# Patient Record
Sex: MALE | Race: WHITE | NOT HISPANIC OR LATINO | URBAN - METROPOLITAN AREA
[De-identification: names, ages, dates, MRNs, and addresses within clinical notes are randomized per-mention and may not be internally consistent; named-entity substitution may affect disease eponyms.]

---

## 2017-07-26 ENCOUNTER — EMERGENCY (EMERGENCY)
Facility: HOSPITAL | Age: 42
LOS: 1 days | Discharge: LEFT BEFORE DISCHARGE | End: 2017-07-26
Admitting: EMERGENCY MEDICINE
Payer: MEDICARE

## 2017-07-26 VITALS
OXYGEN SATURATION: 96 % | SYSTOLIC BLOOD PRESSURE: 138 MMHG | WEIGHT: 225.09 LBS | DIASTOLIC BLOOD PRESSURE: 86 MMHG | TEMPERATURE: 98 F | HEART RATE: 107 BPM | RESPIRATION RATE: 18 BRPM

## 2017-07-26 DIAGNOSIS — S01.511A LACERATION WITHOUT FOREIGN BODY OF LIP, INITIAL ENCOUNTER: ICD-10-CM

## 2017-07-26 DIAGNOSIS — Z79.899 OTHER LONG TERM (CURRENT) DRUG THERAPY: ICD-10-CM
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
## 2017-07-26 PROCEDURE — 99283 EMERGENCY DEPT VISIT LOW MDM: CPT

## 2017-07-26 PROCEDURE — 99282 EMERGENCY DEPT VISIT SF MDM: CPT

## 2017-07-26 NOTE — ED PROVIDER NOTE - ENMT, MLM
Airway patent, Nasal mucosa clear. Mouth with normal mucosa. Throat has no vesicles, no oropharyngeal exudates and uvula is midline, dentition intact, mild sts to rt maxillofacial area no underlying bony ttp of maxillofacial bones

## 2017-07-26 NOTE — ED PROVIDER NOTE - SKIN, MLM
Skin normal color for race, warm, dry and intact. No evidence of rash, superficial 2cm laceration above rt upper lip slightly encroaching vermillion border, wound scabbed over,no active bleeding,  no surrounding erythema or wound discharge

## 2017-07-26 NOTE — ED ADULT NURSE NOTE - OBJECTIVE STATEMENT
Pt presents with Lac to R upper lip that happened yesterday around 2pm after [place] the [place]. Pt states recent tetanus shot within the last year.

## 2017-07-26 NOTE — ED PROVIDER NOTE - OBJECTIVE STATEMENT
MD 40 yo m with no significant pmh presents to ed for evaluation of facial laceration he sustained yesterday at 2pm.  Pt reports tripping on subway steps and hitting face on the floor.  Pt reports he did not feel injury was a "big deal" at the time but woke up this morning concerned that he might need stitches.  Pt otherwise denies any loc, nausea, vomiting, changes in vision, weakness, other musculoskeletal pain/trauma/injuries, nausea, vomiting, headaches, dizziness, not on blood thinners. Tetanus vaccine utd

## 2017-07-26 NOTE — ED PROVIDER NOTE - PROGRESS NOTE DETAILS
Pt in the middle of evaluation got up, after discussing that laceration had already undergone significant healing since it has been 19 hours since initial injury. Pt reports he is in a hurry, refused any further exam, offered to clean wound, give him a work note, pt deferred and walked out of ed.

## 2017-07-26 NOTE — ED PROVIDER NOTE - MEDICAL DECISION MAKING DETAILS
MD 42 yo m with no significant pmh presents to ed for evaluation of facial laceration he sustained yesterday at 2pm. Significant wound healing already occurred given injury 19 + hours ago.  Pt eloped in the middle of my evaluation, recommended he allow me to clean wound and let me further examine him, pt deferred, also offered pt a work note, pt eloped prior to me finishing my assessment

## 2020-01-22 ENCOUNTER — HOSPITAL ENCOUNTER (INPATIENT)
Dept: HOSPITAL 74 - YASAS | Age: 45
LOS: 1 days | Discharge: LEFT BEFORE DISCHARGE | DRG: 894 | End: 2020-01-23
Attending: ALLERGY & IMMUNOLOGY | Admitting: ALLERGY & IMMUNOLOGY
Payer: COMMERCIAL

## 2020-01-22 VITALS — BODY MASS INDEX: 29.4 KG/M2

## 2020-01-22 DIAGNOSIS — F41.8: ICD-10-CM

## 2020-01-22 DIAGNOSIS — F17.213: ICD-10-CM

## 2020-01-22 DIAGNOSIS — F10.230: Primary | ICD-10-CM

## 2020-01-22 DIAGNOSIS — F32.9: ICD-10-CM

## 2020-01-22 DIAGNOSIS — F14.20: ICD-10-CM

## 2020-01-22 DIAGNOSIS — F19.282: ICD-10-CM

## 2020-01-22 DIAGNOSIS — F39: ICD-10-CM

## 2020-01-22 DIAGNOSIS — F19.24: ICD-10-CM

## 2020-01-23 VITALS — SYSTOLIC BLOOD PRESSURE: 129 MMHG | HEART RATE: 69 BPM | DIASTOLIC BLOOD PRESSURE: 80 MMHG | TEMPERATURE: 97.7 F

## 2020-01-23 PROCEDURE — HZ2ZZZZ DETOXIFICATION SERVICES FOR SUBSTANCE ABUSE TREATMENT: ICD-10-PCS | Performed by: ALLERGY & IMMUNOLOGY

## 2020-01-23 NOTE — HP
CIWA Score


Nausea/Vomitin-Mild Nausea/No Vomiting


Muscle Tremors: 4-Moderate,w/Arms Extend


Anxiety: 3


Agitation: 3


Paroxysmal Sweats: 3


Orientation: 0-Oriented


Tacttile Disturbances: 0-None


Auditory Disturbances: 0-None


Visual Disturbances: 0-None


Headache: 4-Moderately Severe


CIWA-Ar Total Score: 18





- Admission Criteria


[facility] Guidelines: Admission for Medically Managed Detox: 


Requires at least one of the followin. CIWA greater than 12


2. Seizures within the past 24 hours


3. Delirium tremens within the past 24 hours


4. Hallucinations within the past 24 hours


5. Acute intervention needed for co  occurring medical disorder


6. Acute intervention needed for co  occurring psychiatric disorder


7. Severe withdrawal that cannot be handled at a lower level of care (continued


    vomiting, continued diarrhea, abnormal vital signs) requiring intravenous


    medication and/or fluids


8. Pregnancy








Admitting History and Physical





- Smoking History


Smoking history: Former smoker


Have you smoked in the past 12 months: No





- Alcohol/Substance Use


Hx Alcohol Use: Yes





Admission ROS [facility]





- [facility]


Chief Complaint: 





Alcohol withdrawal symptoms


Allergies/Adverse Reactions: 


 Allergies











Allergy/AdvReac Type Severity Reaction Status Date / Time


 


peanut [Peanut] Allergy Severe Itching Verified 20 23:16











History of Present Illness: 





44 years old male with a long history of alcohol dependence is seeking 

admission to detox. Patient was transferred from [facility] emergency room . He denies pasty medical history and reports psych. 

history of depression and anxiety. He reports + eye opener and blackouts. His 

last blackout was yesterday where he was unresponsive and received Narcan from 

EMS. Patient reports that he is unemployed and lives in a shelter. He denies 

suicide attempt and suicidal ideation at this time.


Exam Limitations: No Limitations





- Ebola screening


Have you traveled outside of the country in the last 21 days: No (N)


Have you had contact with anyone from an Ebola affected area: No


Do you have a fever: No





- Review of Systems


Constitutional: Chills, Malaise, Night Sweats, Changes in sleep


EENT: reports: No Symptoms Reported


Respiratory: reports: No Symptoms reported


Cardiac: reports: No Symptoms Reported


GI: reports: Nausea, Poor Appetite, Poor Fluid Intake, Abdominal cramping


: reports: No Symptoms Reported


Musculoskeletal: reports: Back Pain


Integumentary: reports: Dryness, Flushing


Neuro: reports: Tremors


Endocrine: reports: No Symptoms Reported


Hematology: reports: No Symptoms Reported


Psychiatric: reports: Mood/Affect Appropiate, Orientated x3, Anxious, Depressed


Other Systems: Reviewed and Negative





Patient History





- Patient Medical History


Hx Anemia: No


Hx Asthma: No


Hx Chronic Obstructive Pulmonary Disease (COPD): No


Hx Cardiac Disorders: No


Hx Congestive Heart Failure: No


Hx Hypertension: No


Hx Hypercholesterolemia: No


Hx Pacemaker: No


HX Cerebrovascular Accident: No


Hx Seizures: No


Hx Diabetes: No


Hx Gastrointestinal Disorders: No


Hx Liver Disease: No


Hx Genitourinary Disorders: No


Hx Sexually Transmitted Disorders: No


Hx Renal Disease (ESRD): No


Hx Thyroid Disease: No


Hx Human Immunodeficiency Virus (HIV): No (Negative 2019)


Hx Hepatitis C: No


Hx Depression: Yes


Hx Suicide Attempt: No


Hx Bipolar Disorder: No


Hx Schizophrenia: No


Other Medical History: Anxiety -





- Patient Surgical History


Past Surgical History: No





- PPD History


Previous Implant?: No


Documented Results: Negative w/proof


Implanted On Prior R Admission?: Yes


Date: 12


PPD to be Administered?: Yes





- Reproductive History


Patient is a Female of Child Bearing Age (11 -55 yrs old): No (male)





- Smoking Cessation


Smoking history: Current every day smoker


Have you smoked in the past 12 months: No


Aproximately how many cigarettes per day: 20


Hx Chewing Tobacco Use: No


Initiated information on smoking cessation: Yes


'Breaking Loose' booklet given: 20





- Substance & Tx. History


Hx Alcohol Use: Yes


Hx Substance Use: Yes


Substance Use Type: Alcohol, Cocaine


Hx Substance Use Treatment: Yes ([facility])





- Substances abused


  ** Alcohol


Substance route: Oral


Frequency: Daily


Amount used: 12 packs beer


Age of first use: 18


Date of last use: 20





  ** Cocaine


Substance route: Smoking


Frequency: 1-2 times per week


Amount used: 300 hundred dollars.


Age of first use: 37


Date of last use: 20





Admission Physical Exam [facility]





- Vital Signs


Vital Signs: 


 Vital Signs - 24 hr











  20





  23:15


 


Temperature 98.0 F


 


Pulse Rate 69


 


Respiratory 16





Rate 


 


Blood Pressure 126/74














- Physical


General Appearance: Yes: Moderate Distress, Tremorous, Sweating, Anxious


HEENTM: Yes: Within Normal Limits


Respiratory: Yes: Lungs Clear, Normal Breath Sounds, No Respiratory Distress


Neck: Yes: Within Normal Limits


Breast: Yes: Breast Exam Deferred


Cardiology: Yes: Regular Rhythm, Regular Rate


Abdominal: Yes: Normal Bowel Sounds, Soft


Genitourinary: Yes: Within Normal Limits


Back: Yes: Normal Inspection


Musculoskeletal: Yes: Within Normal Limits


Extremities: Yes: Normal Inspection


Neurological: Yes: Within Normal Limits, Alert, Normal Mood/Affect


Integumentary: Yes: Warm


Lymphatic: Yes: Within Normal Limits





- Diagnostic


(1) Alcohol dependence with withdrawal, uncomplicated


Current Visit: Yes   Status: Acute   





(2) Nicotine dependence


Current Visit: Yes   Status: Chronic   


Qualifiers: 


   Nicotine product type: cigarettes   Substance use status: in withdrawal   

Qualified Code(s): F17.213 - Nicotine dependence, cigarettes, with withdrawal   





(3) Depression


Current Visit: Yes   Status: Chronic   


Qualifiers: 


   Depression Type: unspecified   Qualified Code(s): F32.9 - Major depressive 

disorder, single episode, unspecified   





(4) Anxiety


Current Visit: Yes   Status: Chronic   





Cleared for Admission [facility]





- Detox or Rehab


[facility] Level of Care: Medically Managed


Detox Regimen/Protocol: Librium


Claeared for Rehab Admission: No





Breathalyzer





- Breathalyzer


Breathalyzer: 0.19





Urine Drug Screen





- Test Device


Lot number: [id number]


Expiration date: 21





- Control


Is test valid?: Yes





- Results


Drug screen NEGATIVE: No


Urine drug screen results: BZO-Benzodiazepines





Inpatient Rehab Admission





- Rehab Decision to Admit


Inpatient rehab admission?: No

## 2020-01-23 NOTE — PN
S CIWA





- CIWA Score


Nausea/Vomitin-Mild Nausea/No Vomiting


Muscle Tremors: 2


Anxiety: 2


Agitation: 1-Slight > Activity


Paroxysmal Sweats: 3


Orientation: 0-Oriented


Tacttile Disturbances: 1-Very Mild Itch/Numbness


Auditory Disturbances: 0-None


Visual Disturbances: 1-Very Mild Sensitivity


Headache: 0-None Present


CIWA-Ar Total Score: 11





BHS Progress Note (SOAP)


Subjective: 





Patient is  a 45 yo male with hx of alcohol dependence on librium detox 

protocol c/o of sweats, back pain, body aches, chills. 


Objective: 





20 12:38


 Vital Signs











Temperature  97.4 F L  20 09:27


 


Pulse Rate  71   20 09:27


 


Respiratory Rate  18   20 09:27


 


Blood Pressure  128/80   20 09:27


 


O2 Sat by Pulse Oximetry (%)      








labs pending 


Assessment: 





20 12:38


Patient is Aox3 no acute distress 


+ diaphoretic


full ROM no gait disturbance ambulating in the unit 





withdrawal sx 


Plan: 





increase fluids 


continue detox 


continue to monitor

## 2020-01-23 NOTE — EKG
Test Reason : 

Blood Pressure : ***/*** mmHG

Vent. Rate : 065 BPM     Atrial Rate : 065 BPM

   P-R Int : 140 ms          QRS Dur : 088 ms

    QT Int : 434 ms       P-R-T Axes : 043 066 030 degrees

   QTc Int : 451 ms

 

NORMAL SINUS RHYTHM

NORMAL ECG

NO PREVIOUS ECGS AVAILABLE

Confirmed by SALO ELMORE MD ([id number]) on 1/23/2020 3:15:02 PM

 

Referred By: Clint Solis           Confirmed By:SALO ELMORE MD

## 2020-01-23 NOTE — DS
[facility] Detox Discharge Summary


Admission Date: 


01/23/20





Discharge Date: 01/23/20 (Pt left AMA)





- History


Present History: Alcohol Dependence, Cocaine Dependence


Additional Comments: 





As per H&P: "44 years old male with a long history of alcohol dependence is 

seeking admission to detox. Patient was transferred from [facility] emergency room . He denies pasty medical history and reports psych. 

history of depression and anxiety. He reports + eye opener and blackouts. His 

last blackout was yesterday where he was unresponsive and received Narcan from 

EMS. Patient reports that he is unemployed and lives in a shelter. He denies 

suicide attempt and suicidal ideation at this time". 


Pt left AMA, pt did not complete the detox protocol. Pt did not wait for the 

provider to evaluate him. As per RN, pt states, he just want to leave and an 

attempt to let him stay and complete the detox protocol failed. Pt was admitted 

today and left AMA.


Pertinent Past History: 





h/o alcohol and cocaine use disorder.





- Physical Exam Results


Vital Signs: 


 Vital Signs











Temperature  97.7 F   01/23/20 21:35


 


Pulse Rate  69   01/23/20 21:35


 


Respiratory Rate  18   01/23/20 21:35


 


Blood Pressure  129/80   01/23/20 21:35


 


O2 Sat by Pulse Oximetry (%)      








 Vital Signs











  01/23/20 01/23/20





  17:19 21:35


 


Temperature 98.1 F 97.7 F


 


Pulse Rate 66 69


 


Respiratory 18 18





Rate  


 


Blood Pressure 131/84 129/80








Pertinent Admission Physical Exam Findings: 





withdrawal symptoms.





- Medication


Discharge Medications: 


Ambulatory Orders





Fluoxetine HCl [Prozac] 20 mg PO DAILY 07/09/12 


Gabapentin 1,200 mg PO TID 07/09/12 


clonazePAM [Klonopin] 1 mg PO BID 07/09/12 











- Diagnosis


(1) Alcohol dependence


Status: Active   





(2) Alcohol dependence with withdrawal, uncomplicated


Status: Acute   





(3) Cocaine dependence


Status: Chronic   





(4) Nicotine dependence


Status: Chronic   


Qualifiers: 


   Nicotine product type: cigarettes   Substance use status: in withdrawal   

Qualified Code(s): F17.213 - Nicotine dependence, cigarettes, with withdrawal   





- AMA


Did Patient Leave Against Medical Advice: Yes

## 2020-01-23 NOTE — CONSULT
[facility] Psychiatric Consult





- Data


Date of interview: 01/23/20


Admission source: [facility]


Identifying data: Patient is a 44 year old single male, without children, 

unemployed, homeless, and is supported by SSD. This is patient's first 

admission to detox at [facility]. Patient admitted to  for alcohol 

and cocaine dependence.


Substance Abuse History: Smoking Cessation.  Smoking history: Current every day 

smoker.  Have you smoked in the past 12 months: No.  Aproximately how many 

cigarettes per day: 20.  Hx Chewing Tobacco Use: No.  Initiated information on 

smoking cessation: Yes.  'Breaking Loose' booklet given: 01/23/20.  - Substance 

& Tx. History.  Hx Alcohol Use: Yes.  Hx Substance Use: Yes.  Substance Use Type

: Alcohol, Cocaine.  Hx Substance Use Treatment: Yes ([facility]).  - Substances abused.  ** Alcohol.  Substance route: Oral.  

Frequency: Daily.  Amount used: 12 packs beer.  Age of first use: 18.  Date of 

last use: 01/22/20.  ** Cocaine.  Substance route: Smoking.  Frequency: 1-2 

times per week.  Amount used: 300 hundred dollars.  Age of first use: 37.  Date 

of last use: 01/08/20


Psychiatric History: Patient presents as a vague historian. Patient's first 

psychiatric contact was in 2005 at a private office due to feeling depressed. 

He was prescribed klonopin + other psychotropic medications. Stated to writer,  

" I do not want to go through every medications i have taken." States he was 

diagnosed with OCD, schizoaffective disorder. Mr. Monahan's most recent 

outpatient psychiatric care was one year ago in [place] but is unable to 

recall medications prescribed. Patient appears to have a history of 

noncompliance to treatment. He denies history of psychiatric hospitalizations 

and suicide attempt. At present he denies auditory/visual hallucinations.


Physical/Sexual Abuse/Trauma History: denies.





Mental Status Exam





- Mental Status Exam


Alert and Oriented to: Time, Place, Person


Cognitive Function: Good


Patient Appearance: Well Groomed


Mood: Anxious, Irritable (slightly irritable but in control)


Affect: Mood Congruent


Patient Behavior: Fatigued, Uncooperative (unwilling to elaborate on history. 

states he's not in the right state of mind to answer all the questions. )


Speech Pattern: Appropriate


Voice Loudness: Normal


Thought Process: Goal Oriented


Thought Disorder: Not Present


Hallucinations: Denies


Suicidal Ideation: Denies


Homicidal Ideation: Denies


Insight/Judgement: Poor


Sleep: Poorly


Appetite: Fair


Muscle strength/Tone: Normal


Gait/Station: Normal





Psychiatric Findings





- Problem List (Axis 1, 2,3)


(1) Alcohol dependence with withdrawal, uncomplicated


Current Visit: Yes   Status: Acute   





(2) Nicotine dependence


Current Visit: Yes   Status: Chronic   


Qualifiers: 


   Nicotine product type: cigarettes   Substance use status: in withdrawal   

Qualified Code(s): F17.213 - Nicotine dependence, cigarettes, with withdrawal   





(3) Substance induced mood disorder


Current Visit: Yes   Status: Acute   





(4) Substance-induced sleep disorder


Current Visit: Yes   Status: Acute   





(5) Mood disorder


Current Visit: No   Status: Suspected   





- Initial Treatment Plan


Initial Treatment Plan: Psychoeducation provided. Detoxification in progress. 

Will order Melatonin 10mg HS. Benefits and side effects discussed. Verbal 

consent given.

## 2020-04-21 ENCOUNTER — EMERGENCY (EMERGENCY)
Facility: HOSPITAL | Age: 45
LOS: 1 days | Discharge: HOME | End: 2020-04-21
Attending: EMERGENCY MEDICINE | Admitting: EMERGENCY MEDICINE
Payer: MEDICARE

## 2020-04-21 VITALS
OXYGEN SATURATION: 98 % | TEMPERATURE: 98 F | HEART RATE: 75 BPM | SYSTOLIC BLOOD PRESSURE: 140 MMHG | RESPIRATION RATE: 16 BRPM | DIASTOLIC BLOOD PRESSURE: 66 MMHG | WEIGHT: 209.44 LBS

## 2020-04-21 VITALS
DIASTOLIC BLOOD PRESSURE: 80 MMHG | RESPIRATION RATE: 18 BRPM | HEART RATE: 60 BPM | TEMPERATURE: 98 F | SYSTOLIC BLOOD PRESSURE: 144 MMHG | OXYGEN SATURATION: 98 %

## 2020-04-21 LAB
ALBUMIN SERPL ELPH-MCNC: 3.8 G/DL — SIGNIFICANT CHANGE UP (ref 3.3–5)
ALP SERPL-CCNC: 59 U/L — SIGNIFICANT CHANGE UP (ref 40–120)
ALT FLD-CCNC: 18 U/L — SIGNIFICANT CHANGE UP (ref 10–45)
ANION GAP SERPL CALC-SCNC: 9 MMOL/L — SIGNIFICANT CHANGE UP (ref 5–17)
AST SERPL-CCNC: 26 U/L — SIGNIFICANT CHANGE UP (ref 10–40)
BASOPHILS # BLD AUTO: 0.04 K/UL — SIGNIFICANT CHANGE UP (ref 0–0.2)
BASOPHILS NFR BLD AUTO: 0.6 % — SIGNIFICANT CHANGE UP (ref 0–2)
BILIRUB SERPL-MCNC: 0.2 MG/DL — SIGNIFICANT CHANGE UP (ref 0.2–1.2)
BUN SERPL-MCNC: 9 MG/DL — SIGNIFICANT CHANGE UP (ref 7–23)
CALCIUM SERPL-MCNC: 9.1 MG/DL — SIGNIFICANT CHANGE UP (ref 8.4–10.5)
CHLORIDE SERPL-SCNC: 103 MMOL/L — SIGNIFICANT CHANGE UP (ref 96–108)
CO2 SERPL-SCNC: 25 MMOL/L — SIGNIFICANT CHANGE UP (ref 22–31)
CREAT SERPL-MCNC: 0.93 MG/DL — SIGNIFICANT CHANGE UP (ref 0.5–1.3)
EOSINOPHIL # BLD AUTO: 0.18 K/UL — SIGNIFICANT CHANGE UP (ref 0–0.5)
EOSINOPHIL NFR BLD AUTO: 2.8 % — SIGNIFICANT CHANGE UP (ref 0–6)
GLUCOSE SERPL-MCNC: 101 MG/DL — HIGH (ref 70–99)
HCT VFR BLD CALC: 39.5 % — SIGNIFICANT CHANGE UP (ref 39–50)
HGB BLD-MCNC: 13.1 G/DL — SIGNIFICANT CHANGE UP (ref 13–17)
IMM GRANULOCYTES NFR BLD AUTO: 0.6 % — SIGNIFICANT CHANGE UP (ref 0–1.5)
LYMPHOCYTES # BLD AUTO: 2.37 K/UL — SIGNIFICANT CHANGE UP (ref 1–3.3)
LYMPHOCYTES # BLD AUTO: 36.6 % — SIGNIFICANT CHANGE UP (ref 13–44)
MCHC RBC-ENTMCNC: 30.3 PG — SIGNIFICANT CHANGE UP (ref 27–34)
MCHC RBC-ENTMCNC: 33.2 GM/DL — SIGNIFICANT CHANGE UP (ref 32–36)
MCV RBC AUTO: 91.4 FL — SIGNIFICANT CHANGE UP (ref 80–100)
MONOCYTES # BLD AUTO: 0.49 K/UL — SIGNIFICANT CHANGE UP (ref 0–0.9)
MONOCYTES NFR BLD AUTO: 7.6 % — SIGNIFICANT CHANGE UP (ref 2–14)
NEUTROPHILS # BLD AUTO: 3.36 K/UL — SIGNIFICANT CHANGE UP (ref 1.8–7.4)
NEUTROPHILS NFR BLD AUTO: 51.8 % — SIGNIFICANT CHANGE UP (ref 43–77)
NRBC # BLD: 0 /100 WBCS — SIGNIFICANT CHANGE UP (ref 0–0)
PLATELET # BLD AUTO: 268 K/UL — SIGNIFICANT CHANGE UP (ref 150–400)
POTASSIUM SERPL-MCNC: 4.3 MMOL/L — SIGNIFICANT CHANGE UP (ref 3.5–5.3)
POTASSIUM SERPL-SCNC: 4.3 MMOL/L — SIGNIFICANT CHANGE UP (ref 3.5–5.3)
PROT SERPL-MCNC: 6.4 G/DL — SIGNIFICANT CHANGE UP (ref 6–8.3)
RBC # BLD: 4.32 M/UL — SIGNIFICANT CHANGE UP (ref 4.2–5.8)
RBC # FLD: 12.1 % — SIGNIFICANT CHANGE UP (ref 10.3–14.5)
SARS-COV-2 RNA SPEC QL NAA+PROBE: SIGNIFICANT CHANGE UP
SODIUM SERPL-SCNC: 137 MMOL/L — SIGNIFICANT CHANGE UP (ref 135–145)
TROPONIN T SERPL-MCNC: <0.01 NG/ML — SIGNIFICANT CHANGE UP (ref 0–0.01)
WBC # BLD: 6.48 K/UL — SIGNIFICANT CHANGE UP (ref 3.8–10.5)
WBC # FLD AUTO: 6.48 K/UL — SIGNIFICANT CHANGE UP (ref 3.8–10.5)

## 2020-04-21 PROCEDURE — 71045 X-RAY EXAM CHEST 1 VIEW: CPT | Mod: 26

## 2020-04-21 PROCEDURE — 36415 COLL VENOUS BLD VENIPUNCTURE: CPT

## 2020-04-21 PROCEDURE — 85025 COMPLETE CBC W/AUTO DIFF WBC: CPT

## 2020-04-21 PROCEDURE — 71045 X-RAY EXAM CHEST 1 VIEW: CPT

## 2020-04-21 PROCEDURE — 99285 EMERGENCY DEPT VISIT HI MDM: CPT

## 2020-04-21 PROCEDURE — 87635 SARS-COV-2 COVID-19 AMP PRB: CPT

## 2020-04-21 PROCEDURE — 99283 EMERGENCY DEPT VISIT LOW MDM: CPT | Mod: 25

## 2020-04-21 PROCEDURE — 84484 ASSAY OF TROPONIN QUANT: CPT

## 2020-04-21 PROCEDURE — 80053 COMPREHEN METABOLIC PANEL: CPT

## 2020-04-21 RX ORDER — ASPIRIN/CALCIUM CARB/MAGNESIUM 324 MG
325 TABLET ORAL ONCE
Refills: 0 | Status: COMPLETED | OUTPATIENT
Start: 2020-04-21 | End: 2020-04-21

## 2020-04-21 RX ADMIN — Medication 325 MILLIGRAM(S): at 21:25

## 2020-04-21 NOTE — ED ADULT TRIAGE NOTE - NS ED TRIAGE AVPU SCALE
Alert-The patient is alert, awake and responds to voice. The patient is oriented to time, place, and person. The triage nurse is able to obtain subjective information. decreased strength

## 2020-04-21 NOTE — ED PROVIDER NOTE - CLINICAL SUMMARY MEDICAL DECISION MAKING FREE TEXT BOX
here w/ chest pain in setting of infectious types symptoms concerning for poss covid-19 however no hypoxia, does not meet threshold for admission at this time. will send swab, and check cxr and troponin

## 2020-04-21 NOTE — ED ADULT NURSE NOTE - NSIMPLEMENTINTERV_GEN_ALL_ED
Implemented All Universal Safety Interventions:  [place] to call system. Call bell, personal items and telephone within reach. Instruct patient to call for assistance. Room bathroom lighting operational. Non-slip footwear when patient is off stretcher. Physically safe environment: no spills, clutter or unnecessary equipment. Stretcher in lowest position, wheels locked, appropriate side rails in place.

## 2020-04-21 NOTE — ED PROVIDER NOTE - NSFOLLOWUPINSTRUCTIONS_ED_ALL_ED_FT
We sent a COVID-19 test today, the results should come by tomorrow or the day after. Please refer to the COVID-19 specific discharge papers we gave you today.    Chest Pain    Chest pain can be caused by many different conditions which may or may not be dangerous. Causes include heartburn, lung infections, heart attack, blood clot in lungs, skin infections, strain or damage to muscle, cartilage, or bones, etc. In addition to a history and physical examination, an electrocardiogram (ECG) or other lab tests may have been performed to determine the cause of your chest pain. Follow up with your primary care provider or with a cardiologist as instructed.     SEEK IMMEDIATE MEDICAL CARE IF YOU HAVE ANY OF THE FOLLOWING SYMPTOMS: worsening chest pain, coughing up blood, unexplained back/neck/jaw pain, severe abdominal pain, dizziness or lightheadedness, fainting, shortness of breath, sweaty or clammy skin, vomiting, or racing heart beat. These symptoms may represent a serious problem that is an emergency. Do not wait to see if the symptoms will go away. Get medical help right away. Call 911 and do not drive yourself to the hospital.

## 2020-04-21 NOTE — ED ADULT NURSE NOTE - OBJECTIVE STATEMENT
pt complains of chest pain that started "months ago." Pt denies fever, SOB or cough. Unable to further assess pain due to pt's refusal. Pt stated "I do not want to talk about it please stop."

## 2020-04-21 NOTE — ED PROVIDER NOTE - OBJECTIVE STATEMENT
43yo M denies PMH (but states hasn't seen a doctor in a while), takes no meds, here w/ complaint of chest pain, cough, "cold symptoms" and subj fevers for several days, pt unclear exactly how long. Feels L sided chest pressure that radiates diffusely. No SOB. No N/V/D, abdominal pain.

## 2020-04-21 NOTE — ED PROVIDER NOTE - PATIENT PORTAL LINK FT
You can access the FollowMyHealth Patient Portal offered by [facility] by registering at the following website: [email or web address]. By joining [facility]’s FollowMyHealth portal, you will also be able to view your health information using other applications (apps) compatible with our system.

## 2020-04-25 DIAGNOSIS — R05 COUGH: ICD-10-CM

## 2020-04-25 DIAGNOSIS — R50.9 FEVER, UNSPECIFIED: ICD-10-CM

## 2020-04-25 DIAGNOSIS — Z20.828 CONTACT WITH AND (SUSPECTED) EXPOSURE TO OTHER VIRAL COMMUNICABLE DISEASES: ICD-10-CM

## 2020-04-25 DIAGNOSIS — F17.200 NICOTINE DEPENDENCE, UNSPECIFIED, UNCOMPLICATED: ICD-10-CM

## 2020-04-25 DIAGNOSIS — R07.9 CHEST PAIN, UNSPECIFIED: ICD-10-CM

## 2020-04-25 DIAGNOSIS — R07.89 OTHER CHEST PAIN: ICD-10-CM

## 2021-03-21 ENCOUNTER — EMERGENCY (EMERGENCY)
Facility: HOSPITAL | Age: 46
LOS: 0 days | Discharge: HOME | End: 2021-03-21
Admitting: EMERGENCY MEDICINE
Payer: SELF-PAY

## 2021-03-21 DIAGNOSIS — Z53.21 PROCEDURE AND TREATMENT NOT CARRIED OUT DUE TO PATIENT LEAVING PRIOR TO BEING SEEN BY HEALTH CARE PROVIDER: ICD-10-CM

## 2021-03-21 PROCEDURE — L9991: CPT

## 2021-10-29 ENCOUNTER — EMERGENCY (EMERGENCY)
Facility: HOSPITAL | Age: 46
LOS: 1 days | Discharge: HOME | End: 2021-10-29
Attending: EMERGENCY MEDICINE
Payer: MEDICARE

## 2021-10-29 ENCOUNTER — INPATIENT (INPATIENT)
Facility: HOSPITAL | Age: 46
LOS: 10 days | Discharge: HOME | End: 2021-11-09
Attending: STUDENT IN AN ORGANIZED HEALTH CARE EDUCATION/TRAINING PROGRAM | Admitting: STUDENT IN AN ORGANIZED HEALTH CARE EDUCATION/TRAINING PROGRAM
Payer: MEDICARE

## 2021-10-29 VITALS — WEIGHT: 244.93 LBS

## 2021-10-29 VITALS
TEMPERATURE: 98 F | SYSTOLIC BLOOD PRESSURE: 132 MMHG | OXYGEN SATURATION: 97 % | HEART RATE: 78 BPM | DIASTOLIC BLOOD PRESSURE: 86 MMHG | RESPIRATION RATE: 18 BRPM

## 2021-10-29 VITALS
HEART RATE: 83 BPM | SYSTOLIC BLOOD PRESSURE: 151 MMHG | DIASTOLIC BLOOD PRESSURE: 90 MMHG | WEIGHT: 199.96 LBS | RESPIRATION RATE: 18 BRPM | HEIGHT: 69 IN | OXYGEN SATURATION: 97 % | TEMPERATURE: 98 F

## 2021-10-29 DIAGNOSIS — F10.20 ALCOHOL DEPENDENCE, UNCOMPLICATED: ICD-10-CM

## 2021-10-29 DIAGNOSIS — F33.2 MAJOR DEPRESSIVE DISORDER, RECURRENT SEVERE WITHOUT PSYCHOTIC FEATURES: ICD-10-CM

## 2021-10-29 DIAGNOSIS — F10.10 ALCOHOL ABUSE, UNCOMPLICATED: ICD-10-CM

## 2021-10-29 LAB
ALBUMIN SERPL ELPH-MCNC: 4.4 G/DL — SIGNIFICANT CHANGE UP (ref 3.3–5.2)
ALP SERPL-CCNC: 87 U/L — SIGNIFICANT CHANGE UP (ref 40–120)
ALT FLD-CCNC: 31 U/L — SIGNIFICANT CHANGE UP
AMPHET UR-MCNC: NEGATIVE — SIGNIFICANT CHANGE UP
ANION GAP SERPL CALC-SCNC: 16 MMOL/L — SIGNIFICANT CHANGE UP (ref 5–17)
AST SERPL-CCNC: 30 U/L — SIGNIFICANT CHANGE UP
BARBITURATES UR SCN-MCNC: NEGATIVE — SIGNIFICANT CHANGE UP
BASOPHILS # BLD AUTO: 0.03 K/UL — SIGNIFICANT CHANGE UP (ref 0–0.2)
BASOPHILS NFR BLD AUTO: 0.4 % — SIGNIFICANT CHANGE UP (ref 0–2)
BENZODIAZ UR-MCNC: NEGATIVE — SIGNIFICANT CHANGE UP
BILIRUB SERPL-MCNC: <0.2 MG/DL — LOW (ref 0.4–2)
BUN SERPL-MCNC: 20.6 MG/DL — HIGH (ref 8–20)
CALCIUM SERPL-MCNC: 8.7 MG/DL — SIGNIFICANT CHANGE UP (ref 8.6–10.2)
CHLORIDE SERPL-SCNC: 104 MMOL/L — SIGNIFICANT CHANGE UP (ref 98–107)
CO2 SERPL-SCNC: 19 MMOL/L — LOW (ref 22–29)
COCAINE METAB.OTHER UR-MCNC: NEGATIVE — SIGNIFICANT CHANGE UP
CREAT SERPL-MCNC: 0.95 MG/DL — SIGNIFICANT CHANGE UP (ref 0.5–1.3)
EOSINOPHIL # BLD AUTO: 0.19 K/UL — SIGNIFICANT CHANGE UP (ref 0–0.5)
EOSINOPHIL NFR BLD AUTO: 2.3 % — SIGNIFICANT CHANGE UP (ref 0–6)
ETHANOL SERPL-MCNC: <10 MG/DL — SIGNIFICANT CHANGE UP (ref 0–9)
GLUCOSE SERPL-MCNC: 111 MG/DL — HIGH (ref 70–99)
HCT VFR BLD CALC: 38.6 % — LOW (ref 39–50)
HGB BLD-MCNC: 13.6 G/DL — SIGNIFICANT CHANGE UP (ref 13–17)
HIV 1 & 2 AB SERPL IA.RAPID: SIGNIFICANT CHANGE UP
IMM GRANULOCYTES NFR BLD AUTO: 0.4 % — SIGNIFICANT CHANGE UP (ref 0–1.5)
LYMPHOCYTES # BLD AUTO: 3.01 K/UL — SIGNIFICANT CHANGE UP (ref 1–3.3)
LYMPHOCYTES # BLD AUTO: 37.1 % — SIGNIFICANT CHANGE UP (ref 13–44)
MCHC RBC-ENTMCNC: 29.8 PG — SIGNIFICANT CHANGE UP (ref 27–34)
MCHC RBC-ENTMCNC: 35.2 GM/DL — SIGNIFICANT CHANGE UP (ref 32–36)
MCV RBC AUTO: 84.6 FL — SIGNIFICANT CHANGE UP (ref 80–100)
METHADONE UR-MCNC: NEGATIVE — SIGNIFICANT CHANGE UP
MONOCYTES # BLD AUTO: 0.52 K/UL — SIGNIFICANT CHANGE UP (ref 0–0.9)
MONOCYTES NFR BLD AUTO: 6.4 % — SIGNIFICANT CHANGE UP (ref 2–14)
NEUTROPHILS # BLD AUTO: 4.34 K/UL — SIGNIFICANT CHANGE UP (ref 1.8–7.4)
NEUTROPHILS NFR BLD AUTO: 53.4 % — SIGNIFICANT CHANGE UP (ref 43–77)
OPIATES UR-MCNC: NEGATIVE — SIGNIFICANT CHANGE UP
PCP SPEC-MCNC: SIGNIFICANT CHANGE UP
PCP UR-MCNC: NEGATIVE — SIGNIFICANT CHANGE UP
PLATELET # BLD AUTO: 236 K/UL — SIGNIFICANT CHANGE UP (ref 150–400)
POTASSIUM SERPL-MCNC: 3.9 MMOL/L — SIGNIFICANT CHANGE UP (ref 3.5–5.3)
POTASSIUM SERPL-SCNC: 3.9 MMOL/L — SIGNIFICANT CHANGE UP (ref 3.5–5.3)
PROT SERPL-MCNC: 7.2 G/DL — SIGNIFICANT CHANGE UP (ref 6.6–8.7)
RBC # BLD: 4.56 M/UL — SIGNIFICANT CHANGE UP (ref 4.2–5.8)
RBC # FLD: 13.2 % — SIGNIFICANT CHANGE UP (ref 10.3–14.5)
SARS-COV-2 RNA SPEC QL NAA+PROBE: SIGNIFICANT CHANGE UP
SODIUM SERPL-SCNC: 139 MMOL/L — SIGNIFICANT CHANGE UP (ref 135–145)
THC UR QL: NEGATIVE — SIGNIFICANT CHANGE UP
WBC # BLD: 8.12 K/UL — SIGNIFICANT CHANGE UP (ref 3.8–10.5)
WBC # FLD AUTO: 8.12 K/UL — SIGNIFICANT CHANGE UP (ref 3.8–10.5)

## 2021-10-29 PROCEDURE — 99285 EMERGENCY DEPT VISIT HI MDM: CPT

## 2021-10-29 PROCEDURE — 80053 COMPREHEN METABOLIC PANEL: CPT

## 2021-10-29 PROCEDURE — 93010 ELECTROCARDIOGRAM REPORT: CPT

## 2021-10-29 PROCEDURE — U0005: CPT

## 2021-10-29 PROCEDURE — G0378: CPT

## 2021-10-29 PROCEDURE — 36415 COLL VENOUS BLD VENIPUNCTURE: CPT

## 2021-10-29 PROCEDURE — 85025 COMPLETE CBC W/AUTO DIFF WBC: CPT

## 2021-10-29 PROCEDURE — 99218: CPT

## 2021-10-29 PROCEDURE — 93005 ELECTROCARDIOGRAM TRACING: CPT

## 2021-10-29 PROCEDURE — U0003: CPT

## 2021-10-29 PROCEDURE — 99222 1ST HOSP IP/OBS MODERATE 55: CPT

## 2021-10-29 PROCEDURE — 80307 DRUG TEST PRSMV CHEM ANLYZR: CPT

## 2021-10-29 PROCEDURE — 86703 HIV-1/HIV-2 1 RESULT ANTBDY: CPT

## 2021-10-29 RX ORDER — TOPIRAMATE 25 MG
50 TABLET ORAL AT BEDTIME
Refills: 0 | Status: DISCONTINUED | OUTPATIENT
Start: 2021-10-29 | End: 2021-11-02

## 2021-10-29 RX ORDER — TOPIRAMATE 25 MG
100 TABLET ORAL AT BEDTIME
Refills: 0 | Status: DISCONTINUED | OUTPATIENT
Start: 2021-10-29 | End: 2021-10-29

## 2021-10-29 RX ORDER — PANTOPRAZOLE SODIUM 20 MG/1
40 TABLET, DELAYED RELEASE ORAL
Refills: 0 | Status: DISCONTINUED | OUTPATIENT
Start: 2021-10-29 | End: 2021-11-09

## 2021-10-29 RX ORDER — TRAZODONE HCL 50 MG
100 TABLET ORAL AT BEDTIME
Refills: 0 | Status: DISCONTINUED | OUTPATIENT
Start: 2021-10-29 | End: 2021-11-09

## 2021-10-29 RX ORDER — FOLIC ACID 0.8 MG
1 TABLET ORAL DAILY
Refills: 0 | Status: COMPLETED | OUTPATIENT
Start: 2021-10-29 | End: 2021-11-05

## 2021-10-29 RX ORDER — NALTREXONE HYDROCHLORIDE 50 MG/1
50 TABLET, FILM COATED ORAL DAILY
Refills: 0 | Status: DISCONTINUED | OUTPATIENT
Start: 2021-10-29 | End: 2021-11-02

## 2021-10-29 RX ORDER — TRAZODONE HCL 50 MG
50 TABLET ORAL AT BEDTIME
Refills: 0 | Status: DISCONTINUED | OUTPATIENT
Start: 2021-10-29 | End: 2021-10-29

## 2021-10-29 RX ORDER — SERTRALINE 25 MG/1
100 TABLET, FILM COATED ORAL DAILY
Refills: 0 | Status: DISCONTINUED | OUTPATIENT
Start: 2021-10-29 | End: 2021-11-02

## 2021-10-29 RX ORDER — TRAZODONE HCL 50 MG
100 TABLET ORAL AT BEDTIME
Refills: 0 | Status: DISCONTINUED | OUTPATIENT
Start: 2021-10-29 | End: 2021-11-02

## 2021-10-29 RX ORDER — NALTREXONE HYDROCHLORIDE 50 MG/1
50 TABLET, FILM COATED ORAL DAILY
Refills: 0 | Status: DISCONTINUED | OUTPATIENT
Start: 2021-10-29 | End: 2021-11-09

## 2021-10-29 RX ORDER — PANTOPRAZOLE SODIUM 20 MG/1
40 TABLET, DELAYED RELEASE ORAL
Refills: 0 | Status: DISCONTINUED | OUTPATIENT
Start: 2021-10-29 | End: 2021-11-02

## 2021-10-29 RX ORDER — RISPERIDONE 4 MG/1
1 TABLET ORAL AT BEDTIME
Refills: 0 | Status: DISCONTINUED | OUTPATIENT
Start: 2021-10-29 | End: 2021-10-31

## 2021-10-29 RX ORDER — THIAMINE MONONITRATE (VIT B1) 100 MG
100 TABLET ORAL DAILY
Refills: 0 | Status: COMPLETED | OUTPATIENT
Start: 2021-10-29 | End: 2021-11-01

## 2021-10-29 RX ORDER — TOPIRAMATE 25 MG
50 TABLET ORAL DAILY
Refills: 0 | Status: DISCONTINUED | OUTPATIENT
Start: 2021-10-29 | End: 2021-11-09

## 2021-10-29 RX ORDER — SERTRALINE 25 MG/1
100 TABLET, FILM COATED ORAL DAILY
Refills: 0 | Status: DISCONTINUED | OUTPATIENT
Start: 2021-10-29 | End: 2021-10-31

## 2021-10-29 RX ORDER — TOPIRAMATE 25 MG
50 TABLET ORAL DAILY
Refills: 0 | Status: DISCONTINUED | OUTPATIENT
Start: 2021-10-29 | End: 2021-10-29

## 2021-10-29 RX ORDER — HYDROXYZINE HCL 10 MG
50 TABLET ORAL EVERY 6 HOURS
Refills: 0 | Status: DISCONTINUED | OUTPATIENT
Start: 2021-10-29 | End: 2021-11-09

## 2021-10-29 RX ADMIN — NALTREXONE HYDROCHLORIDE 50 MILLIGRAM(S): 50 TABLET, FILM COATED ORAL at 14:23

## 2021-10-29 RX ADMIN — RISPERIDONE 1 MILLIGRAM(S): 4 TABLET ORAL at 20:37

## 2021-10-29 RX ADMIN — Medication 100 MILLIGRAM(S): at 20:37

## 2021-10-29 RX ADMIN — SERTRALINE 100 MILLIGRAM(S): 25 TABLET, FILM COATED ORAL at 14:23

## 2021-10-29 NOTE — ED BEHAVIORAL HEALTH ASSESSMENT NOTE - CURRENT MEDICATION
Medications prescribed from [facility] per pt:  Risperidone, Trazadone, Zoloft, Topamax, Naltrexone, Protonix

## 2021-10-29 NOTE — ED BEHAVIORAL HEALTH ASSESSMENT NOTE - DESCRIPTION
1:1 was at bedside for constant observation before pt was brought back to . Pt was cooperative but anxious and irritable during interview. States he is still having suicidal thoughts and wants to "blow his brans out". Admitted to drinking 12 beers before arrival to the ED. Pt with strange affect. GERD Pt has been in [facility] since Monday when he was discharged from [facility]. Was previously homeless. Unemployed. Admits to overeating and poor sleep.

## 2021-10-29 NOTE — BH PATIENT PROFILE - HOME MEDICATIONS
RisperDAL 1 mg oral tablet , 1 tab(s) orally once a day (at bedtime)  Topamax 50 mg oral tablet , 1 tab(s) orally once a day (at bedtime)  Protonix 40 mg oral delayed release tablet , 1 tab(s) orally once a day  naltrexone 50 mg oral tablet , 1 tab(s) orally once a day  traZODone 50 mg oral tablet , 1 tab(s) orally once a day (at bedtime), As needed, insomnia  Zoloft 25 mg oral tablet , 1 tab(s) orally once a day  folic acid 1 mg oral tablet , 1 tab(s) orally once a day  Multiple Vitamins oral tablet , 1 tab(s) orally once a day  Wellbutrin SR ,  orally

## 2021-10-29 NOTE — BH PATIENT PROFILE - NSSBIRTOFTENALCOHOL_GEN_A_CORE
pt has hx alcohol abuse, pt reports drinking 12 beers every other day, last drink was yesterday.  Pt was d/c from [facility] 4 days ago after a 19 day admission for alcohol rehab. Pt maintained on CIWA protocol/4 or more times a week

## 2021-10-29 NOTE — ED ADULT NURSE NOTE - CHIEF COMPLAINT QUOTE
BIBEMS from [facility]. C/o suicidal ideations. Pt states, "I plan on taking a gun and shooting my brains out". Denies HI/Delusions/Hallucinations. Hx of Depression and anxiety.

## 2021-10-29 NOTE — BH INPATIENT PSYCHIATRY ASSESSMENT NOTE - NSSUICPROTFACT_PSY_ALL_CORE
Supportive social network of family or friends/Fear of death or the actual act of killing self/Positive therapeutic relationships/Frustration tolerance

## 2021-10-29 NOTE — BH INPATIENT PSYCHIATRY ASSESSMENT NOTE - NSBHCHARTREVIEWVS_PSY_A_CORE FT
Vital Signs Last 24 Hrs  T(C): 36.8 (10-29-21 @ 18:21), Max: 36.8 (10-29-21 @ 18:21)  T(F): 98.2 (10-29-21 @ 18:21), Max: 98.2 (10-29-21 @ 18:21)  HR: --  BP: --  BP(mean): --  RR: --  SpO2: --    Orthostatic VS  10-29-21 @ 18:21  Lying BP: --/-- HR: --  Sitting BP: 130/87 HR: 67  Standing BP: 125/79 HR: 75  Site: --  Mode: --

## 2021-10-29 NOTE — CHART NOTE - NSCHARTNOTEFT_GEN_A_CORE
Screening Medical Evaluation  Patient Admitted from: [facility] ED    [facility] admitting diagnosis: Severe episode of recurrent major depressive disorder, without psychotic features    PAST MEDICAL & SURGICAL HISTORY:  Bipolar disorder    Polysubstance abuse    Anxiety    No significant past surgical history          Allergies    No Known Allergies    Intolerances        Social History:     FAMILY HISTORY:      MEDICATIONS  (STANDING):  folic acid 1 milliGRAM(s) Oral daily  multivitamin 1 Tablet(s) Oral daily  naltrexone 50 milliGRAM(s) Oral daily  pantoprazole    Tablet 40 milliGRAM(s) Oral before breakfast  risperiDONE   Tablet 1 milliGRAM(s) Oral at bedtime  sertraline 100 milliGRAM(s) Oral daily  thiamine 100 milliGRAM(s) Oral daily  topiramate 50 milliGRAM(s) Oral daily  traZODone 100 milliGRAM(s) Oral at bedtime    MEDICATIONS  (PRN):  hydrOXYzine hydrochloride 50 milliGRAM(s) Oral every 6 hours PRN anxiety  LORazepam     Tablet 2 milliGRAM(s) Oral every 2 hours PRN CIWA score increase by 2 points and current CIWA score GREATER THAN 9  LORazepam   Injectable 2 milliGRAM(s) IntraMuscular every 2 hours PRN CIWA score increase by 2 points and current CIWA score GREATER THAN 9      Vital Signs Last 24 Hrs  T(C): 36.8 (29 Oct 2021 18:21), Max: 36.8 (29 Oct 2021 18:21)  T(F): 98.2 (29 Oct 2021 18:21), Max: 98.2 (29 Oct 2021 18:21)  HR: --  BP: --  BP(mean): --  RR: --  SpO2: --  CAPILLARY BLOOD GLUCOSE            PHYSICAL EXAM:  GENERAL: NAD, well-developed  HEAD:  Atraumatic, Normocephalic  EYES: EOMI, PERRLA, conjunctiva and sclera clear  NECK: Supple, No JVD  CHEST/LUNG: Clear to auscultation bilaterally; No wheeze  HEART: Regular rate and rhythm; No murmurs, rubs, or gallops  ABDOMEN: Soft, Nontender, Nondistended; Bowel sounds present  EXTREMITIES:  2+ Peripheral Pulses, No clubbing, cyanosis, or edema  PSYCH: AAOx3  NEUROLOGY: non-focal  SKIN: No rashes or lesions    LABS:            RADIOLOGY & ADDITIONAL TESTS:    Assessment and Plan:  46 year old male with PMHx of alcohol abuse, GERD presents to [facility] with an admitting diagnosis of Severe episode of recurrent major depressive disorder, without psychotic features. Pt has no medical complaints at this time including fevers, headache, dizziness, changes in vision, chest pain, SOB, abdominal pain, N/V/D/C, dysuria.     1. Severe episode of recurrent major depressive disorder, without psychotic features- follow plan per psychiatric team    2. Alcohol abuse- continue symptom triggered CIWA    3. GERD- continue Protonix 40mg daily

## 2021-10-29 NOTE — ED CDU PROVIDER INITIAL DAY NOTE - OBJECTIVE STATEMENT
45 yo M with hx of anxiety and depression s/p d/c from [facility] 4 days ago after 19 day admission presents to ED via [facility] after going back to [facility] stating he was suicidal with plans of taking a gun and "blowing his brains out" .  Pt states he has been compliant with all his meds since d/c and admits to drinking 12 beers earlier tonight.  Pt received  J&J COVID vaccine 10 days ago during his hospital stay.  Pt denies any hallucinations or HI

## 2021-10-29 NOTE — BH INPATIENT PSYCHIATRY ASSESSMENT NOTE - CURRENT MEDICATION
MEDICATIONS  (STANDING):  folic acid 1 milliGRAM(s) Oral daily  multivitamin 1 Tablet(s) Oral daily  naltrexone 50 milliGRAM(s) Oral daily  pantoprazole    Tablet 40 milliGRAM(s) Oral before breakfast  risperiDONE   Tablet 1 milliGRAM(s) Oral at bedtime  sertraline 100 milliGRAM(s) Oral daily  thiamine 100 milliGRAM(s) Oral daily  topiramate 50 milliGRAM(s) Oral daily  traZODone 100 milliGRAM(s) Oral at bedtime    MEDICATIONS  (PRN):  LORazepam     Tablet 2 milliGRAM(s) Oral every 2 hours PRN CIWA score increase by 2 points and current CIWA score GREATER THAN 9  LORazepam   Injectable 2 milliGRAM(s) IntraMuscular every 2 hours PRN CIWA score increase by 2 points and current CIWA score GREATER THAN 9

## 2021-10-29 NOTE — ED BEHAVIORAL HEALTH ASSESSMENT NOTE - SUMMARY
Pt with h/o depression and anxiety presented to the ED for suicidal thoughts mentioning that he wants to "blow his brains out with a gun". Unsure what brought on these thoughts. Denies HI. Pt admitted to drinking 12 beers yesterday prior to arrival to the ED. Was d/c from [facility] on Monday where he was admitted for rehab. Placed into sober home and left yesterday. Pt with flat and strange affect. States he has been hearing ringing for many years.

## 2021-10-29 NOTE — ED PROVIDER NOTE - NSICDXPASTMEDICALHX_GEN_ALL_CORE_FT
PAST MEDICAL HISTORY:  Anxiety depression      GERD (gastroesophageal reflux disease)     H/O: depression

## 2021-10-29 NOTE — BH INPATIENT PSYCHIATRY ASSESSMENT NOTE - HPI (INCLUDE ILLNESS QUALITY, SEVERITY, DURATION, TIMING, CONTEXT, MODIFYING FACTORS, ASSOCIATED SIGNS AND SYMPTOMS)
47 y/o male with PMH of anxiety, depression, alcohol abuse and GERD BIBEMS to [facility] ed for complaints of suicidal ideation.  Pt states that he wanted to "blow his brains out with a gun". Denies HI.   Currently denies suicidal ideation in the hospuitakl and states he has no plan or intent while here.  he states he came to the ed for help and wants to get better and is hopeful.   Pt was d/c from [facility] 4 days ago after a 19 day admission for alcohol rehab. UTox negative. States he was prescribed risperidone, Trazadone, zoloft, Topamax, naltrexone, and Protonix at [facility] but was never previously on these medications. Compliant with medications since he was prescribed.   States he was prescribed many medications in the past but does not remember them all.  States he believes he was hospitalized in inpatient psychiatry prior dbut does not remember when or what it was for.  He was at [facility] in 2016.   he states he had a suicide attempt but could not tell the writer what happened or when it was.       Pt came to the ED yesterday after he left his sober home ([facility]) and drank 12 beers. Admits to drinking around 12 beers every other day. Denies illicit drug use since he was arrested in January for cocaine and paraphernalia. Before his admission to [facility], pt was homeless and was placed in [facility] upon discharge. Also admits to hearing ringing in his ears for years.   States he has been having poor sleep since discharged.  He reports increased appetite; gained 6 lbs in one month.   patient states his mood is down but is hopeful.   He reports some anergia.    He denies manic symptoms.    Patient denies hallucinations or overt delusions, but his thought process was concrete and mildly disorganized on exam.    CIWA was completed and is currently 0 45 y/o male with PMH of anxiety, depression, schizoaffective disorder prer prior [facility] records, alcohol abuse and GERD BIBEMS to [facility] ed for complaints of suicidal ideation.  Pt states that he wanted to "blow his brains out with a gun". States he does not own a gun, but states he feels he knows he could buy one off the street.   Denies HI.   Currently denies suicidal ideation in the hospital and states he has no plan or intent while here.  he states he came to the ed for help and wants to get better and is hopeful.   Pt was d/c from [facility] 4 days ago after a 19 day admission for alcohol rehab. UTox negative. States he was prescribed risperidone, Trazadone, zoloft, Topamax, naltrexone, and Protonix at [facility] but was never previously on these medications. Compliant with medications since he was prescribed.   States he was prescribed many medications in the past but does not remember them all.  States he believes he was hospitalized in inpatient psychiatry prior but does not remember when or what it was for.  He was at [facility] in 2016.   he states he had a suicide attempt but could not tell the writer what happened or when it was.       Pt came to the ED yesterday after he left his sober home ([facility]) and drank 12 beers. Admits to drinking around 12 beers every other day. Denies illicit drug use since he was arrested in January for cocaine and paraphernalia. Before his admission to [facility], pt was homeless and was placed in [facility] upon discharge. Also admits to hearing ringing in his ears for years.   States he has been having poor sleep since discharged.  He reports increased appetite; gained 6 lbs in one month.   patient states his mood is down but is hopeful.   He reports some anergia.    He denies manic symptoms.    Patient denies hallucinations or overt delusions, but his thought process was concrete and mildly disorganized on exam.    CIWA was completed and is currently 0

## 2021-10-29 NOTE — ED ADULT NURSE REASSESSMENT NOTE - DESCRIPTION
received pt in yellow gown waned by security for contraband. pt is ao strange affect.  as per pt I am suicidal I feel like killing myself. when asked about pmh and pph pt answers I would not be able to answer that.  pt admits to [facility] stating he hears ringing.  pt was d/tessie 4 days ago from [facility] and admits to drinking every other day.  yesterday he states he had 12 beers.  pt followed up after d/c with [facility]. states he is compliant with meds.  pt denies h/i. pt oriented to unit and surrounding instructed for urine. pt made comfortable.  awaiting consult.

## 2021-10-29 NOTE — ED ADULT TRIAGE NOTE - CHIEF COMPLAINT QUOTE
BIBEMS from [facility]. C/o suicidal ideations. Pt states, "I plan on taking a gun and shooting my brains out". Denies HI/Delusions/Hallucinations. Hx of Depression and anxiety.

## 2021-10-29 NOTE — BH INPATIENT PSYCHIATRY ASSESSMENT NOTE - NSBHASSESSSUMMFT_PSY_ALL_CORE
46 year old male with a past psychiatry h/o depression, schizoaffective disorder by prior chart, alcohol and cocaine use disorder who was transferred to [facility] on a 9.37 legal status as he presented to the ED for suicidal thoughts mentioning that he wants to "blow his brains out with a gun". Unsure what brought on these thoughts. Denies HI. Currently denies si/hi with no intent or plan.  Wants to get better, possibly go back to rehab on discharge. Pt admitted to drinking 12 beers yesterday prior to arrival to the ED and was drinking every other day since The patient’s case has been reviewed with the treatment team on day of discharge.  4 days ago. Was d/c from [facility] on Monday where he was admitted for rehab. Patients ciwa is currently 0 on exam, vitals stable but will monitor for signs and signs of withdrawal.   Pt with flat and strange affect and per prior records was diagnosed with schizoaffective on prior admission.    will admit to [address] [place] [address]  no shoelaces or sharps  will place on q15 checks as patient currently denies suicidal ideation, intent or plan.  feels environment is safe and supportive, feels comfortable talking with dtaff, is hopeful that he will get better and already thinking about dc plans  will cont naltrexone  50mg daily, topamax for cravings  will cont zoloft 100mg daily for deopression and trazodone 100mg hs for insomnia  will cont risperdal 1mg hs  primary team to call [facility] on monday to obtain collateral from rehab  will start ciwa scale and give ativan 2mg po/im for sxs of withdrawal.  if has increased ativan usage or goes into withdrawal will start ativan taper  will give mvi, thiamine, folic acid for supplementation

## 2021-10-29 NOTE — ED PROVIDER NOTE - OBJECTIVE STATEMENT
45 yo M with hx of anxiety and depression s/p d/c from [facility] 4 days ago after 19 day admission presents to ED via [facility] after going back to [facility] stating he was suicidal with plans of taking a gun and "blowing his brains out" .  Pt states he has been compliant with all his meds since d/c and admits to drinking 12 beers earlier tonight.  Pt received  J&J COVID vaccine 10 days ago during his hospital stay.  Pt denies any hallucinations or HI

## 2021-10-29 NOTE — BH INPATIENT PSYCHIATRY ASSESSMENT NOTE - NSBHSAALC_PSY_A_CORE FT
patient reports drinking 12 beers every other day since The patient’s case has been reviewed with the treatment team on day of discharge.    On initial mental status exam, (pull from 1st progress note)    Patient was started on……………..    On day of discharge patient’s mental status exam: (pull from last progress note)    The patient’s ______ symptoms have improved significantly. Patient was visible on the unit, socialized with peers and attended groups with fair participation. Patient did not require any PRN medications during his hospital course (except in the ed) for agitation.  Patient was remained in fair behavioral control on the unit. Patient denied any suicidal or homicidal ideations throughout hospitalization. Patient was provided with extensive psychoeducation regarding importance of compliance with medications.     On safety assessment on day of discharge, patient has the following risk factors which are nonmodifiable which include (pull from risk factors). Currently there are no modifiable risk factors that could further be addressed in the inpatient hospital setting as patient denies any depressive sxs, is not suicidal with no intent or plan, has improvement in sleep and energy, and improvement in manic sxs (such as impulsivity, irritability, intrusiveness, psychomotor agitation). Protective factors include (pull from protective factors).  He is a chronic moderate risk secondary to nonmodifiable risks but not an acute danger to self or others.        Pt was provided with pharmacotherapy and psychotherapy throughout this hospitalization and upon discharge was instructed to practice the provided safe coping mechanisms and to take prescribed medication only as directed. Patient is not judged to be an imminent danger to self or others at this time. Patient was provided with emergency phone numbers and were instructed to call 911 or go to the nearest ER for worsening of symptoms, dangerousness to self/others.     Patient denies any suicidal or homicidal ideation, intent and plan at the time of discharge. Patient is able to care for self. There is no evidence of florid psychosis or sania or exam. Patient does not pose imminent danger to self or others, stable for discharge home. Prognosis is guarded. Patient will be discharged today to home and outpatient follow-up at [facility] outpatient department on 7/9 with Dr. Dunn to address further psychotic sxs.     Medications upon discharge: pull from discharge medication reconciliation).    Patient will need a follow up from rehab.  reports history of blackouts, but denies withdrawal seizures or dts

## 2021-10-29 NOTE — ED ADULT NURSE REASSESSMENT NOTE - NS ED NURSE REASSESS COMMENT FT1
assumed care of pt, pt at risk to harm self denies any HI, 1:1 at bedside for constant observation, room checked for safety.

## 2021-10-29 NOTE — BH INPATIENT PSYCHIATRY ASSESSMENT NOTE - NSBHSACOC_PSY_A_CORE FT
patient states he has not used in 4weeks since arrest.   "does not remember" how much he was using during that time

## 2021-10-29 NOTE — ED CDU PROVIDER INITIAL DAY NOTE - CPE EDP NEURO NORM
PHYSICAL THERAPY Initial Evaluation:    Date: 2/16/2019  Recorded diagnosis/complaint at time of admission:  Active Hospital Problems    Diagnosis Date Noted   â¢ S/P CABG x 5 02/15/2019     Priority: Low   â¢ Coronary artery disease 02/13/2019     Priority: Low     2/11/2019/ [facility]/ Dr Wendy Feliciano Multivessel CAD. Recommend bypass surgery.   2/15/2019/ [facility]/ Dr Suman Bueno coronary artery bypass grafting with left atrial appendectomy       Co-morbidities:   Patient Active Problem List   Diagnosis   â¢ Essential hypertension   â¢ Reflux esophagitis   â¢ Pure hypercholesterolemia   â¢ Intervertebral cervical disc disorder with myelopathy, cervical region   â¢ Acute appendicitis with generalized peritonitis   â¢ FH: colon cancer   â¢ Pain in joint, shoulder region   â¢ Other affections of shoulder region, not elsewhere classified   â¢ Left sided sciatica   â¢ Paroxysmal atrial fibrillation (CMS/HCC)   â¢ Right knee pain   â¢ Internal derangement of right knee   â¢ Sensorineural hearing loss, bilateral   â¢ Subjective tinnitus of both ears   â¢ Atrial flutter (CMS/HCC)   â¢ right knee partial medial meniscectomy; grade 3 patellar and medial femoral condyle chondral malacia; 7/28/16   â¢ Trigger middle finger of right hand   â¢ Avulsion fracture distal phalanx left 4th finger - doi 5/12/17   â¢ Osteoarthritis of left hand   â¢ Radial styloid tenosynovitis   â¢ Coronary artery disease   â¢ S/P CABG x 5     Clinical Presentation: stable and/or uncomplicated characteristics  Treatment Diagnosis: Pain, Impaired Range of Motion, Impaired Gait/Locomotion Deficits, Impaired Mobility and Impaired Balance    Therapy Precautions:  Cardiac Sternal Precautions: no lifting greater than 10 lbs, no pushing or pulling, use log roll technique for bed mobility, use mk UE for balance only not body weight support during transfers and ambulation     S/p CABG x5 2/15/19    Activity: As tolerated    Cognition: Alert and [place] x3      SUBJECTIVE:   Pt. Reported agreeable to therapy, agreeable to having spouse present for session    and stating that he walked with OT earlier, was dizzy following walk  Pt's personal goal for therapy: return home    Pain:  At start of session: 5/10  At end of session: 7/10  Intervention: premedicated, nursing aware and repositioned  Location: sternum  Quality: tightness     OBSERVATION:   Pt is utilizing Pulse Ox, Blood Pressure Cuff, Telemetry, Central Line, Chest Tube and External Pacemaker  Skin Integrity: Impaired, sternal incision, chest tube insertions, bilateral LE harvest site incsions; none observed under bandaging; bandaging clean dry and intact  Edema: non-pitting bilateral, hand, lower extremity  Collaboration with: Nurse Front royal and PT student Esmer    Vital Signs: blood pressure End of session in sitting, 125/60  oxygen saturation with activity 98 % and 0 L of O2     ROM (degrees):  UE: not tested secondary to sternal precautions  LE: within functional limits  Lumbar: within functional limits  Cervical: within functional limits    Strength (out of 5 in available AROM):  UE: not tested secondary to sternal precautions  LE: within functional limits  Lumbar: within functional limits  Cervical: within functional limits  Abdominal: limited weakness with flexion forward from posterior surfaces    Neurological:  Coordination: intact  Sensation: intact    Balance:   Static Sitting: intact  Dynamic Sitting: intact  Static Standing: impaired ; requires light UE support  Dynamic Standing: impaired ; requries UE support for weightshifting and stepping    Outcome Measure:    Task Score    Rolling 4 = minimal assistance   Supine to Sit 4 = minimal assistance   Sitting Edge of Bed 5 = supervision   Sit to Stand 4 = minimal assistance   Ambulation  4 = minimal assistance   Total  21/35   Date last completed: 2/16/2019       MOBILITY:    Bed:   Rolling: Minimal Assistance (min A)  Sit to Supine: Minimal Assistance (min A)  Reason for Assistance: requires increased time to complete, weakness, verbal cues for instructing logroll sequence, steral precautions, and logroll rationalle, assistance requried to guide trunk and LEs as well as for supine repositioning assistance    Transfers:   Device Used: gait belt, 2 wheeled walker  Sit to Stand: Contact Guard Assistance (CGA)  Stand to Sit: Contact Guard Assistance (CGA)  Reason for Assistance: requires increased time to complete, weakness, verbal cues for hand placement, unsteadiness     Ambulation:   Assistance Level: Contact Guard Assistance (CGA)  Distance: 175 feet  Device Used: gait belt and 2 wheeled walker  Gait Mechanics: step through pattern, decreased step length bilateral, decreased mirella  Reason for Assistance: requires increased time to complete, weakness, verbal cues for upright posture, pacing, and cues for light UE support through walker, fatigue      Stairs:   Not addressed this session    Exercises:  PT instructed pt in AROM for bilateral hips, knees, and ankles for sitting and supien with pt verbalizing understanding. PT reinforced the importance of ambulating every 2 hours    Activity Tolerance: limited by fatigue and pain     GOALS:     Rehab potential is good due to positive factors family support, activity tolerance, post - surgical  and negative factors pain level, co-morbidities    Review Date: 2/23/2019  1. Patient will complete HEP with Cloud. Progressing toward  2. Patient will complete bed mobility with Modified Cloud (mod I). Progressing toward  3. Patient will complete sit to/from stand with Modified Cloud (mod I) and least restrictive device. Progressing toward  4. Patient will ambulate 300 feet with Modified Cloud (mod I) and least restrictive device. Progressing toward  5. Patient will negotiate 1 stairs with Modified Cloud (mod I) and least restrictive device. Goal not met  6.  Patient will demonstrate and/or verbalize understanding of car transfer completion. Goal not met     PLAN OF CARE:    PT Frequency: 7 days/week  Duration: LOS  Treatment/Interventions: Functional transfer training, Strengthening, ROM, Endurance training, Patient/Family training, Equipment eval/education, Bed mobility, Gait training, Compensatory technique education, Stairs retraining, Safety Education     RECOMMENDATIONS/EDUCATION:    Learner: patient  Readiness to Learn: acceptance  Learning Method: Verbal  Barriers to Learning: no barriers apparent at this time  Learning Evaluation: Needs reinforcement  Teaching Content: Precautions, Weight Bearing Status, Positioning, Equipment, Safety Awareness, Functional Mobility, HEP importance and Role of physical therapy in the hospital setting  Please reference the patient education activity for further information regarding the patient's learning assessment. Equipment for discharge: to be determined - continuing to assess needs at this time       Order Status: no order placed        Delivery Status: pt states that he has 2WW stored in shed at home     See doc flowsheet (PT assess/treat/goals/charges) for specific information regarding time spent with patient. Treatment Plan for Next Session: bed mobility, transfers, ambulation, trial no AD vs HHA, initiate stoop as appropriate    The plan of care and goals were established with the patient and he is in agreement. ASSESSMENT:       Patient presents to physical therapy below baseline functional mobility level of independence. Patient is demonstrating decreased range of motion, decreased strength, balance deficits, pain, decreased activity tolerance, swelling, decreased endurance which is limiting the completion of all functional mobility at this time. Further skilled physical therapy is required to address these limitations in attempt to maximize the patient's independence.      Recommendation for Discharge: PT: Home(follow-up with cardiac rehab as indicated) After today's session this therapist is recommending the above location for optimal discharge. This recommendation is supported by pt tolerating initial mobility well with minimal assistnace. Good progress is anticipated to support a safe d/c home with supportive spouse. Pt will likely not require post acute PT and benefit from more cardiac rehab approach to recovery. normal...

## 2021-10-29 NOTE — ED BEHAVIORAL HEALTH ASSESSMENT NOTE - HPI (INCLUDE ILLNESS QUALITY, SEVERITY, DURATION, TIMING, CONTEXT, MODIFYING FACTORS, ASSOCIATED SIGNS AND SYMPTOMS)
45 y/o male with PMH of anxiety, depression, alcohol abuse and GERD BIBEMS d/t suicidal ideation. Pt states that he wanted to "blow his brains out with a gun". Denies HI. Pt was d/c from [facility] 4 days ago after a 19 day admission for alcohol rehab. UTox negative. States he was prescribed risperidone, trazadone, zoloft, topamax, naltrexone, and protonix at [facility] but was never previously on these medications. Compliant with medications since he was prescribed. Pt came to the ED yesterday after he left his sober home ([facility]) and drank 12 beers. Admits to drinking around 12 beers every other day. Denies illicit drug use since he was arrested in January for cocaine and paraphernalia. Before his admission to [facility], pt was homeless and was placed in [facility] upon discharge. Also admits to hearing ringing in his ears for years. Additionally mentions that he was admitted to a psychiatry facility in the past but does not recall where. States he has been having poor sleep and increased appetite; gained 6 lbs in one month. Pt wants to be admitted inpatient and is asking for medication to help him sleep and calm him down.    COVID-19 screening:  Have you received the COVID-19 vaccine? Yes, J&J  Have you been exposed to COVID-19 in the past 10 days? No  Are you experiencing any symptoms of COVID-19? No  Have you been out of [place] within the past 10 days? No  Have you tested positive within the past 90 days? No 45 y/o male with PMH of anxiety, depression, alcohol abuse and GERD BIBEMS d/t suicidal ideation. Pt states that he wanted to "blow his brains out with a gun". Denies HI. Pt was d/c from [facility] 4 days ago after a 19 day admission for alcohol rehab. UTox negative. States he was prescribed risperidone, Trazadone, zoloft, Topamax, naltrexone, and Protonix at [facility] but was never previously on these medications. Compliant with medications since he was prescribed. Pt came to the ED yesterday after he left his sober home ([facility]) and drank 12 beers. Admits to drinking around 12 beers every other day. Denies illicit drug use since he was arrested in January for cocaine and paraphernalia. Before his admission to [facility], pt was homeless and was placed in [facility] upon discharge. Also admits to hearing ringing in his ears for years. Additionally mentions that he was admitted to a psychiatry facility in the past but does not recall where. States he has been having poor sleep and increased appetite; gained 6 lbs in one month. Pt wants to be admitted inpatient and is asking for medication to help him sleep and calm him down.    COVID-19 screening:  Have you received the COVID-19 vaccine? Yes, J&J  Have you been exposed to COVID-19 in the past 10 days? No  Are you experiencing any symptoms of COVID-19? No  Have you been out of [place] within the past 10 days? No  Have you tested positive within the past 90 days? No

## 2021-10-29 NOTE — CHART NOTE - NSCHARTNOTEFT_GEN_A_CORE
CABRERA Note: Notified by  provider plan is to transfer pt for inpt psychiatric care. 9.37 legals charted. Covid neg. No acute medical.  reports no beds today. Referral made to [facility] [phone]. Referral made to [facility] but pt exhausted his medicare days and has no secondary insurance. SW to follow

## 2021-10-29 NOTE — ED BEHAVIORAL HEALTH ASSESSMENT NOTE - RISK ASSESSMENT
High level of risk for dangerous behavior. Admits to SI and a plan to blow his brains out. Pt does not have residential stability. Alcohol abuse. H/o cocaine abuse. High Acute Suicide Risk

## 2021-10-29 NOTE — BH INPATIENT PSYCHIATRY ASSESSMENT NOTE - RISK ASSESSMENT
risk factors: recent rehab The patient’s case has been reviewed with the treatment team on day of discharge.    On initial mental status exam, (pull from 1st progress note)    Patient was started on……………..    On day of discharge patient’s mental status exam: (pull from last progress note)    The patient’s ______ symptoms have improved significantly. Patient was visible on the unit, socialized with peers and attended groups with fair participation. Patient did not require any PRN medications during his hospital course (except in the ed) for agitation.  Patient was remained in fair behavioral control on the unit. Patient denied any suicidal or homicidal ideations throughout hospitalization. Patient was provided with extensive psychoeducation regarding importance of compliance with medications.     On safety assessment on day of discharge, patient has the following risk factors which are nonmodifiable which include (pull from risk factors). Currently there are no modifiable risk factors that could further be addressed in the inpatient hospital setting as patient denies any depressive sxs, is not suicidal with no intent or plan, has improvement in sleep and energy, and improvement in manic sxs (such as impulsivity, irritability, intrusiveness, psychomotor agitation). Protective factors include (pull from protective factors).  He is a chronic moderate risk secondary to nonmodifiable risks but not an acute danger to self or others.        Pt was provided with pharmacotherapy and psychotherapy throughout this hospitalization and upon discharge was instructed to practice the provided safe coping mechanisms and to take prescribed medication only as directed. Patient is not judged to be an imminent danger to self or others at this time. Patient was provided with emergency phone numbers and were instructed to call 911 or go to the nearest ER for worsening of symptoms, dangerousness to self/others.     Patient denies any suicidal or homicidal ideation, intent and plan at the time of discharge. Patient is able to care for self. There is no evidence of florid psychosis or sania or exam. Patient does not pose imminent danger to self or others, stable for discharge home. Prognosis is guarded. Patient will be discharged today to home and outpatient follow-up at [facility] outpatient department on 7/9 with Dr. Dunn to address further psychotic sxs.     Medications upon discharge: pull from discharge medication reconciliation).    Patient will need a follow up, alcohol use, recent incar

## 2021-10-30 LAB
COVID-19 SPIKE DOMAIN AB INTERP: NEGATIVE — SIGNIFICANT CHANGE UP
COVID-19 SPIKE DOMAIN ANTIBODY RESULT: 0.4 U/ML — SIGNIFICANT CHANGE UP
SARS-COV-2 IGG+IGM SERPL QL IA: 0.4 U/ML — SIGNIFICANT CHANGE UP
SARS-COV-2 IGG+IGM SERPL QL IA: NEGATIVE — SIGNIFICANT CHANGE UP

## 2021-10-30 PROCEDURE — 99231 SBSQ HOSP IP/OBS SF/LOW 25: CPT

## 2021-10-30 RX ORDER — HALOPERIDOL DECANOATE 100 MG/ML
5 INJECTION INTRAMUSCULAR ONCE
Refills: 0 | Status: COMPLETED | OUTPATIENT
Start: 2021-10-30 | End: 2021-10-30

## 2021-10-30 RX ORDER — DIPHENHYDRAMINE HCL 50 MG
50 CAPSULE ORAL ONCE
Refills: 0 | Status: COMPLETED | OUTPATIENT
Start: 2021-10-30 | End: 2021-10-30

## 2021-10-30 RX ORDER — ACETAMINOPHEN 500 MG
650 TABLET ORAL EVERY 6 HOURS
Refills: 0 | Status: DISCONTINUED | OUTPATIENT
Start: 2021-10-30 | End: 2021-11-09

## 2021-10-30 RX ADMIN — Medication 100 MILLIGRAM(S): at 10:11

## 2021-10-30 RX ADMIN — Medication 50 MILLIGRAM(S): at 10:10

## 2021-10-30 RX ADMIN — Medication 1 TABLET(S): at 10:11

## 2021-10-30 RX ADMIN — Medication 2 MILLIGRAM(S): at 18:49

## 2021-10-30 RX ADMIN — PANTOPRAZOLE SODIUM 40 MILLIGRAM(S): 20 TABLET, DELAYED RELEASE ORAL at 10:10

## 2021-10-30 RX ADMIN — Medication 2 MILLIGRAM(S): at 11:24

## 2021-10-30 RX ADMIN — SERTRALINE 100 MILLIGRAM(S): 25 TABLET, FILM COATED ORAL at 10:11

## 2021-10-30 RX ADMIN — Medication 650 MILLIGRAM(S): at 18:49

## 2021-10-30 RX ADMIN — Medication 50 MILLIGRAM(S): at 21:07

## 2021-10-30 RX ADMIN — RISPERIDONE 1 MILLIGRAM(S): 4 TABLET ORAL at 20:59

## 2021-10-30 RX ADMIN — HALOPERIDOL DECANOATE 5 MILLIGRAM(S): 100 INJECTION INTRAMUSCULAR at 11:24

## 2021-10-30 RX ADMIN — Medication 50 MILLIGRAM(S): at 11:24

## 2021-10-30 RX ADMIN — HALOPERIDOL DECANOATE 5 MILLIGRAM(S): 100 INJECTION INTRAMUSCULAR at 11:00

## 2021-10-30 RX ADMIN — Medication 50 MILLIGRAM(S): at 11:00

## 2021-10-30 RX ADMIN — Medication 1 MILLIGRAM(S): at 10:11

## 2021-10-30 RX ADMIN — Medication 2 MILLIGRAM(S): at 11:00

## 2021-10-30 RX ADMIN — NALTREXONE HYDROCHLORIDE 50 MILLIGRAM(S): 50 TABLET, FILM COATED ORAL at 10:11

## 2021-10-30 RX ADMIN — Medication 2 MILLIGRAM(S): at 22:41

## 2021-10-30 RX ADMIN — Medication 100 MILLIGRAM(S): at 20:59

## 2021-10-30 NOTE — PSYCHIATRIC REHAB INITIAL EVALUATION - NSBHPRRECOMMEND_PSY_ALL_CORE
Patient is a 46 year old single male admitted to [facility] 1N unit on 10/30/21 due to depression, SI, and alcohol abuse. Patient was recently discharged from [facility] several days ago, where he was hospitalized for alcohol withdrawal. Patient is currently residing at a sober house. Patient has a recent arrest for possession.  Psychiatric rehabilitation staff will work with patient on establishing a collaborative rehabilitation goal.   Due the Covid-19 pandemic, unit structure and activities are revaluated on the regular basis in effort to maintain the safety of our patients and staff.  Patient is a 46 year old single male admitted to [facility] 1N unit on 10/30/21 due to depression, SI, and alcohol abuse. Patient was recently discharged from [facility] several days ago, where he was hospitalized for alcohol withdrawal. Patient is currently residing at a sober house. Patient has a recent arrest for possession of substances.  Psychiatric rehabilitation staff will work with patient on establishing a collaborative rehabilitation goal.   Due the Covid-19 pandemic, unit structure and activities are revaluated on the regular basis in effort to maintain the safety of our patients and staff.

## 2021-10-30 NOTE — BH CHART NOTE - NSEVENTNOTEFT_PSY_ALL_CORE
Psych emergency called at 11:10am. Per staff patient was agitated, threatening his roommate, unable to be redirected. Haldol 5 mg IM x 1, Ativan 2 mg IM x 1, Benadryl 50 mg IM x 1 activated. Patient placed into seclusion at 11:15am. On exam, patient was resting comfortably in NAD, IM with fair effect. Patient will remain in seclusion for the shortest amount of time possible until pt is no longer agitated and able to be redirected. Advised RN staff to notify JOE if patient continues to be agitated. Case d/w nurse, who is in agreement.  Psych emergency called at 11:10am. Per staff patient was agitated, threatening his roommate, unable to be redirected. Haldol 5 mg IM x 1, Ativan 2 mg IM x 1, Benadryl 50 mg IM x 1 activated. Patient placed into seclusion at 11:15am. On exam, patient was resting comfortably in NAD, IM with fair effect. Patient will remain in seclusion for the shortest amount of time possible until pt is no longer agitated and able to be redirected. Advised RN staff to notify JOE if patient continues to be agitated. Case d/w nurse, who is in agreement.     Patient continued to be agitated, urinating in the seclusion room. Seclusion renewed at 12:15pm. On exam, patient was resting comfortably in NAD. Case d/w nurse, who is in agreement.  Psych emergency called at 11:10am. Per staff patient was agitated, threatening his roommate, unable to be redirected. Haldol 5 mg IM x 1, Ativan 2 mg IM x 1, Benadryl 50 mg IM x 1 activated. Patient placed into seclusion at 11:15am. On exam, patient was resting comfortably in NAD, IM with fair effect. Patient will remain in seclusion for the shortest amount of time possible until pt is no longer agitated and able to be redirected. Advised RN staff to notify JOE if patient continues to be agitated. Case d/w nurse, who is in agreement.     Patient continued to be agitated, urinating in the seclusion room. Seclusion renewed at 12:15pm. On exam, patient was resting comfortably in NAD. Case d/w nurse, who is in agreement.     Patient removed from seclusion at 12:30pm.

## 2021-10-31 LAB — SARS-COV-2 RNA SPEC QL NAA+PROBE: SIGNIFICANT CHANGE UP

## 2021-10-31 PROCEDURE — 99231 SBSQ HOSP IP/OBS SF/LOW 25: CPT

## 2021-10-31 RX ORDER — SERTRALINE 25 MG/1
150 TABLET, FILM COATED ORAL DAILY
Refills: 0 | Status: DISCONTINUED | OUTPATIENT
Start: 2021-10-31 | End: 2021-11-09

## 2021-10-31 RX ORDER — DIPHENHYDRAMINE HCL 50 MG
25 CAPSULE ORAL ONCE
Refills: 0 | Status: DISCONTINUED | OUTPATIENT
Start: 2021-10-31 | End: 2021-11-09

## 2021-10-31 RX ORDER — DIPHENHYDRAMINE HCL 50 MG
50 CAPSULE ORAL EVERY 4 HOURS
Refills: 0 | Status: DISCONTINUED | OUTPATIENT
Start: 2021-10-31 | End: 2021-11-04

## 2021-10-31 RX ORDER — HALOPERIDOL DECANOATE 100 MG/ML
7.5 INJECTION INTRAMUSCULAR ONCE
Refills: 0 | Status: DISCONTINUED | OUTPATIENT
Start: 2021-10-31 | End: 2021-11-09

## 2021-10-31 RX ORDER — HALOPERIDOL DECANOATE 100 MG/ML
5 INJECTION INTRAMUSCULAR EVERY 4 HOURS
Refills: 0 | Status: DISCONTINUED | OUTPATIENT
Start: 2021-10-31 | End: 2021-11-04

## 2021-10-31 RX ORDER — NICOTINE POLACRILEX 2 MG
4 GUM BUCCAL
Refills: 0 | Status: DISCONTINUED | OUTPATIENT
Start: 2021-10-31 | End: 2021-11-09

## 2021-10-31 RX ORDER — NICOTINE POLACRILEX 2 MG
1 GUM BUCCAL DAILY
Refills: 0 | Status: DISCONTINUED | OUTPATIENT
Start: 2021-10-31 | End: 2021-11-02

## 2021-10-31 RX ORDER — RISPERIDONE 4 MG/1
2 TABLET ORAL AT BEDTIME
Refills: 0 | Status: DISCONTINUED | OUTPATIENT
Start: 2021-10-31 | End: 2021-11-03

## 2021-10-31 RX ADMIN — RISPERIDONE 2 MILLIGRAM(S): 4 TABLET ORAL at 20:35

## 2021-10-31 RX ADMIN — Medication 1 TABLET(S): at 08:29

## 2021-10-31 RX ADMIN — Medication 650 MILLIGRAM(S): at 13:25

## 2021-10-31 RX ADMIN — Medication 1 MILLIGRAM(S): at 08:29

## 2021-10-31 RX ADMIN — NALTREXONE HYDROCHLORIDE 50 MILLIGRAM(S): 50 TABLET, FILM COATED ORAL at 08:29

## 2021-10-31 RX ADMIN — Medication 50 MILLIGRAM(S): at 09:16

## 2021-10-31 RX ADMIN — HALOPERIDOL DECANOATE 5 MILLIGRAM(S): 100 INJECTION INTRAMUSCULAR at 20:35

## 2021-10-31 RX ADMIN — SERTRALINE 100 MILLIGRAM(S): 25 TABLET, FILM COATED ORAL at 08:29

## 2021-10-31 RX ADMIN — Medication 2 MILLIGRAM(S): at 11:23

## 2021-10-31 RX ADMIN — Medication 100 MILLIGRAM(S): at 20:35

## 2021-10-31 RX ADMIN — Medication 2 MILLIGRAM(S): at 08:29

## 2021-10-31 RX ADMIN — Medication 50 MILLIGRAM(S): at 11:23

## 2021-10-31 RX ADMIN — HALOPERIDOL DECANOATE 5 MILLIGRAM(S): 100 INJECTION INTRAMUSCULAR at 09:16

## 2021-10-31 RX ADMIN — PANTOPRAZOLE SODIUM 40 MILLIGRAM(S): 20 TABLET, DELAYED RELEASE ORAL at 08:28

## 2021-10-31 RX ADMIN — Medication 50 MILLIGRAM(S): at 08:29

## 2021-10-31 RX ADMIN — Medication 650 MILLIGRAM(S): at 22:14

## 2021-10-31 RX ADMIN — Medication 4 MILLIGRAM(S): at 09:34

## 2021-10-31 RX ADMIN — HALOPERIDOL DECANOATE 5 MILLIGRAM(S): 100 INJECTION INTRAMUSCULAR at 13:20

## 2021-10-31 RX ADMIN — Medication 4 MILLIGRAM(S): at 21:17

## 2021-10-31 RX ADMIN — Medication 2 MILLIGRAM(S): at 20:35

## 2021-10-31 RX ADMIN — Medication 100 MILLIGRAM(S): at 08:30

## 2021-10-31 RX ADMIN — Medication 50 MILLIGRAM(S): at 13:20

## 2021-10-31 RX ADMIN — Medication 1 PATCH: at 09:33

## 2021-10-31 RX ADMIN — Medication 4 MILLIGRAM(S): at 18:11

## 2021-10-31 RX ADMIN — Medication 650 MILLIGRAM(S): at 20:42

## 2021-10-31 NOTE — BH INPATIENT PSYCHIATRY PROGRESS NOTE - NSBHASSESSSUMMFT_PSY_ALL_CORE
patient remains severely depressed and irritable with SI to shoot himself.  Increase risperdal to 2mg QHS and increase zoloft to 150mg daily.

## 2021-11-01 PROCEDURE — 99232 SBSQ HOSP IP/OBS MODERATE 35: CPT

## 2021-11-01 RX ORDER — LITHIUM CARBONATE 300 MG/1
600 TABLET, EXTENDED RELEASE ORAL AT BEDTIME
Refills: 0 | Status: DISCONTINUED | OUTPATIENT
Start: 2021-11-01 | End: 2021-11-09

## 2021-11-01 RX ADMIN — Medication 1 PATCH: at 08:45

## 2021-11-01 RX ADMIN — Medication 4 MILLIGRAM(S): at 21:53

## 2021-11-01 RX ADMIN — Medication 4 MILLIGRAM(S): at 08:52

## 2021-11-01 RX ADMIN — Medication 1 PATCH: at 12:28

## 2021-11-01 RX ADMIN — Medication 50 MILLIGRAM(S): at 08:46

## 2021-11-01 RX ADMIN — PANTOPRAZOLE SODIUM 40 MILLIGRAM(S): 20 TABLET, DELAYED RELEASE ORAL at 08:45

## 2021-11-01 RX ADMIN — NALTREXONE HYDROCHLORIDE 50 MILLIGRAM(S): 50 TABLET, FILM COATED ORAL at 08:44

## 2021-11-01 RX ADMIN — LITHIUM CARBONATE 600 MILLIGRAM(S): 300 TABLET, EXTENDED RELEASE ORAL at 20:40

## 2021-11-01 RX ADMIN — Medication 100 MILLIGRAM(S): at 20:40

## 2021-11-01 RX ADMIN — Medication 4 MILLIGRAM(S): at 14:11

## 2021-11-01 RX ADMIN — SERTRALINE 150 MILLIGRAM(S): 25 TABLET, FILM COATED ORAL at 08:46

## 2021-11-01 RX ADMIN — Medication 2 MILLIGRAM(S): at 12:48

## 2021-11-01 RX ADMIN — Medication 4 MILLIGRAM(S): at 10:52

## 2021-11-01 RX ADMIN — Medication 2 MILLIGRAM(S): at 08:47

## 2021-11-01 RX ADMIN — RISPERIDONE 2 MILLIGRAM(S): 4 TABLET ORAL at 20:40

## 2021-11-01 RX ADMIN — Medication 1 MILLIGRAM(S): at 08:45

## 2021-11-01 RX ADMIN — Medication 1 TABLET(S): at 08:45

## 2021-11-01 RX ADMIN — Medication 100 MILLIGRAM(S): at 08:44

## 2021-11-01 NOTE — BH INPATIENT PSYCHIATRY PROGRESS NOTE - NSBHASSESSSUMMFT_PSY_ALL_CORE
patient remains severely depressed and irritable with SI to shoot himself.  Increase risperdal to 2mg QHS and increase zoloft to 150mg daily.  Agrees to addition of lithium.
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
## 2021-11-01 NOTE — BH SOCIAL WORK INITIAL PSYCHOSOCIAL EVALUATION - NSBHHOUSECOMMENTFT_PSY_ALL_CORE
Pt was homeless but placed in sober Home from [facility].  He left and has been drinking, unclear if he can return.

## 2021-11-02 PROCEDURE — 99231 SBSQ HOSP IP/OBS SF/LOW 25: CPT

## 2021-11-02 RX ORDER — NICOTINE POLACRILEX 2 MG
1 GUM BUCCAL DAILY
Refills: 0 | Status: DISCONTINUED | OUTPATIENT
Start: 2021-11-02 | End: 2021-11-04

## 2021-11-02 RX ADMIN — Medication 50 MILLIGRAM(S): at 12:46

## 2021-11-02 RX ADMIN — Medication 50 MILLIGRAM(S): at 08:18

## 2021-11-02 RX ADMIN — NALTREXONE HYDROCHLORIDE 50 MILLIGRAM(S): 50 TABLET, FILM COATED ORAL at 08:19

## 2021-11-02 RX ADMIN — HALOPERIDOL DECANOATE 5 MILLIGRAM(S): 100 INJECTION INTRAMUSCULAR at 19:43

## 2021-11-02 RX ADMIN — Medication 1 MILLIGRAM(S): at 08:19

## 2021-11-02 RX ADMIN — Medication 50 MILLIGRAM(S): at 11:27

## 2021-11-02 RX ADMIN — Medication 650 MILLIGRAM(S): at 12:46

## 2021-11-02 RX ADMIN — Medication 2 MILLIGRAM(S): at 11:26

## 2021-11-02 RX ADMIN — HALOPERIDOL DECANOATE 5 MILLIGRAM(S): 100 INJECTION INTRAMUSCULAR at 15:33

## 2021-11-02 RX ADMIN — Medication 4 MILLIGRAM(S): at 08:23

## 2021-11-02 RX ADMIN — Medication 1 PATCH: at 08:22

## 2021-11-02 RX ADMIN — Medication 1 PATCH: at 08:21

## 2021-11-02 RX ADMIN — Medication 1 TABLET(S): at 08:19

## 2021-11-02 RX ADMIN — SERTRALINE 150 MILLIGRAM(S): 25 TABLET, FILM COATED ORAL at 08:18

## 2021-11-02 RX ADMIN — PANTOPRAZOLE SODIUM 40 MILLIGRAM(S): 20 TABLET, DELAYED RELEASE ORAL at 08:18

## 2021-11-02 RX ADMIN — Medication 100 MILLIGRAM(S): at 20:04

## 2021-11-02 RX ADMIN — Medication 1 PATCH: at 08:07

## 2021-11-02 RX ADMIN — HALOPERIDOL DECANOATE 5 MILLIGRAM(S): 100 INJECTION INTRAMUSCULAR at 11:27

## 2021-11-02 RX ADMIN — Medication 2 MILLIGRAM(S): at 19:43

## 2021-11-02 RX ADMIN — LITHIUM CARBONATE 600 MILLIGRAM(S): 300 TABLET, EXTENDED RELEASE ORAL at 20:04

## 2021-11-02 RX ADMIN — Medication 2 MILLIGRAM(S): at 15:34

## 2021-11-02 RX ADMIN — Medication 50 MILLIGRAM(S): at 15:33

## 2021-11-02 RX ADMIN — Medication 4 MILLIGRAM(S): at 10:31

## 2021-11-02 RX ADMIN — Medication 650 MILLIGRAM(S): at 13:33

## 2021-11-02 RX ADMIN — RISPERIDONE 2 MILLIGRAM(S): 4 TABLET ORAL at 20:04

## 2021-11-02 RX ADMIN — Medication 50 MILLIGRAM(S): at 19:43

## 2021-11-02 NOTE — BH INPATIENT PSYCHIATRY PROGRESS NOTE - NSBHASSESSSUMMFT_PSY_ALL_CORE
patient remains severely depressed and irritable with SI to shoot himself.  Continue risperdal 2mg QHS and increase zoloft 150mg daily, titration to continue tomorrow.  Agrees to addition of lithium.

## 2021-11-03 PROCEDURE — 99232 SBSQ HOSP IP/OBS MODERATE 35: CPT

## 2021-11-03 RX ORDER — RISPERIDONE 4 MG/1
1 TABLET ORAL
Refills: 0 | Status: DISCONTINUED | OUTPATIENT
Start: 2021-11-03 | End: 2021-11-04

## 2021-11-03 RX ORDER — BENZTROPINE MESYLATE 1 MG
0.5 TABLET ORAL
Refills: 0 | Status: DISCONTINUED | OUTPATIENT
Start: 2021-11-03 | End: 2021-11-09

## 2021-11-03 RX ORDER — RISPERIDONE 4 MG/1
3 TABLET ORAL AT BEDTIME
Refills: 0 | Status: COMPLETED | OUTPATIENT
Start: 2021-11-03 | End: 2021-11-03

## 2021-11-03 RX ORDER — RISPERIDONE 4 MG/1
2 TABLET ORAL AT BEDTIME
Refills: 0 | Status: DISCONTINUED | OUTPATIENT
Start: 2021-11-04 | End: 2021-11-04

## 2021-11-03 RX ADMIN — Medication 2 MILLIGRAM(S): at 08:05

## 2021-11-03 RX ADMIN — Medication 4 MILLIGRAM(S): at 08:08

## 2021-11-03 RX ADMIN — Medication 2 MILLIGRAM(S): at 14:56

## 2021-11-03 RX ADMIN — Medication 0.5 MILLIGRAM(S): at 20:21

## 2021-11-03 RX ADMIN — HALOPERIDOL DECANOATE 5 MILLIGRAM(S): 100 INJECTION INTRAMUSCULAR at 20:32

## 2021-11-03 RX ADMIN — LITHIUM CARBONATE 600 MILLIGRAM(S): 300 TABLET, EXTENDED RELEASE ORAL at 20:21

## 2021-11-03 RX ADMIN — NALTREXONE HYDROCHLORIDE 50 MILLIGRAM(S): 50 TABLET, FILM COATED ORAL at 08:06

## 2021-11-03 RX ADMIN — Medication 100 MILLIGRAM(S): at 20:21

## 2021-11-03 RX ADMIN — HALOPERIDOL DECANOATE 5 MILLIGRAM(S): 100 INJECTION INTRAMUSCULAR at 14:56

## 2021-11-03 RX ADMIN — Medication 1 TABLET(S): at 08:06

## 2021-11-03 RX ADMIN — Medication 1 MILLIGRAM(S): at 08:07

## 2021-11-03 RX ADMIN — Medication 50 MILLIGRAM(S): at 08:07

## 2021-11-03 RX ADMIN — HALOPERIDOL DECANOATE 5 MILLIGRAM(S): 100 INJECTION INTRAMUSCULAR at 08:35

## 2021-11-03 RX ADMIN — Medication 2 MILLIGRAM(S): at 20:32

## 2021-11-03 RX ADMIN — PANTOPRAZOLE SODIUM 40 MILLIGRAM(S): 20 TABLET, DELAYED RELEASE ORAL at 08:06

## 2021-11-03 RX ADMIN — SERTRALINE 150 MILLIGRAM(S): 25 TABLET, FILM COATED ORAL at 08:06

## 2021-11-03 RX ADMIN — Medication 50 MILLIGRAM(S): at 20:32

## 2021-11-03 RX ADMIN — Medication 50 MILLIGRAM(S): at 08:35

## 2021-11-03 RX ADMIN — RISPERIDONE 3 MILLIGRAM(S): 4 TABLET ORAL at 20:21

## 2021-11-03 RX ADMIN — Medication 1 PATCH: at 08:07

## 2021-11-03 NOTE — BH TREATMENT PLAN - NSTXSUICIDINTERPR_PSY_ALL_CORE
During this hospitalization, patient will be encouraged to attend daily groups and meet with staff individually in order to identify 3 reasons for living within seven days.

## 2021-11-03 NOTE — BH INPATIENT PSYCHIATRY PROGRESS NOTE - NSBHASSESSSUMMFT_PSY_ALL_CORE
patient remains severely depressed and irritable with SI to shoot himself.  Continue risperdal 2mg QHS and increase zoloft 150mg daily, titration to continue tomorrow.  Agrees to addition of lithium.  Tolerating it well.  Agrees to risperidone TID as alternative to taking multiple PRNs throughout the day.  CIWA canceled as no withdrawal sx have emerged.

## 2021-11-03 NOTE — BH TREATMENT PLAN - NSTXSUICIDINTERRN_PSY_ALL_CORE
Assess pt's mood and for S/I/I/P and SIB, explore healthy coping skills and protective factors, provide support, maintain safety, identify triggers, administer ordered medications, encourage pt to participate in groups and unit activities

## 2021-11-04 PROCEDURE — 99232 SBSQ HOSP IP/OBS MODERATE 35: CPT

## 2021-11-04 RX ORDER — DIPHENHYDRAMINE HCL 50 MG
50 CAPSULE ORAL EVERY 4 HOURS
Refills: 0 | Status: DISCONTINUED | OUTPATIENT
Start: 2021-11-04 | End: 2021-11-09

## 2021-11-04 RX ORDER — OLANZAPINE 15 MG/1
2.5 TABLET, FILM COATED ORAL EVERY 4 HOURS
Refills: 0 | Status: DISCONTINUED | OUTPATIENT
Start: 2021-11-04 | End: 2021-11-09

## 2021-11-04 RX ORDER — NICOTINE POLACRILEX 2 MG
1 GUM BUCCAL DAILY
Refills: 0 | Status: DISCONTINUED | OUTPATIENT
Start: 2021-11-04 | End: 2021-11-09

## 2021-11-04 RX ORDER — RISPERIDONE 4 MG/1
2 TABLET ORAL
Refills: 0 | Status: DISCONTINUED | OUTPATIENT
Start: 2021-11-04 | End: 2021-11-09

## 2021-11-04 RX ORDER — HALOPERIDOL DECANOATE 100 MG/ML
5 INJECTION INTRAMUSCULAR EVERY 4 HOURS
Refills: 0 | Status: DISCONTINUED | OUTPATIENT
Start: 2021-11-04 | End: 2021-11-09

## 2021-11-04 RX ORDER — RISPERIDONE 4 MG/1
2 TABLET ORAL AT BEDTIME
Refills: 0 | Status: DISCONTINUED | OUTPATIENT
Start: 2021-11-04 | End: 2021-11-04

## 2021-11-04 RX ORDER — PSYLLIUM SEED (WITH DEXTROSE)
1 POWDER (GRAM) ORAL DAILY
Refills: 0 | Status: DISCONTINUED | OUTPATIENT
Start: 2021-11-04 | End: 2021-11-09

## 2021-11-04 RX ADMIN — Medication 50 MILLIGRAM(S): at 08:29

## 2021-11-04 RX ADMIN — Medication 100 MILLIGRAM(S): at 20:21

## 2021-11-04 RX ADMIN — NALTREXONE HYDROCHLORIDE 50 MILLIGRAM(S): 50 TABLET, FILM COATED ORAL at 08:28

## 2021-11-04 RX ADMIN — Medication 2 MILLIGRAM(S): at 09:42

## 2021-11-04 RX ADMIN — SERTRALINE 150 MILLIGRAM(S): 25 TABLET, FILM COATED ORAL at 08:28

## 2021-11-04 RX ADMIN — Medication 50 MILLIGRAM(S): at 16:38

## 2021-11-04 RX ADMIN — Medication 0.5 MILLIGRAM(S): at 20:21

## 2021-11-04 RX ADMIN — HALOPERIDOL DECANOATE 5 MILLIGRAM(S): 100 INJECTION INTRAMUSCULAR at 20:33

## 2021-11-04 RX ADMIN — Medication 650 MILLIGRAM(S): at 09:44

## 2021-11-04 RX ADMIN — Medication 650 MILLIGRAM(S): at 12:11

## 2021-11-04 RX ADMIN — RISPERIDONE 1 MILLIGRAM(S): 4 TABLET ORAL at 08:29

## 2021-11-04 RX ADMIN — Medication 650 MILLIGRAM(S): at 21:24

## 2021-11-04 RX ADMIN — Medication 0.5 MILLIGRAM(S): at 08:29

## 2021-11-04 RX ADMIN — RISPERIDONE 2 MILLIGRAM(S): 4 TABLET ORAL at 20:21

## 2021-11-04 RX ADMIN — Medication 50 MILLIGRAM(S): at 09:42

## 2021-11-04 RX ADMIN — PANTOPRAZOLE SODIUM 40 MILLIGRAM(S): 20 TABLET, DELAYED RELEASE ORAL at 08:28

## 2021-11-04 RX ADMIN — Medication 1 PATCH: at 08:29

## 2021-11-04 RX ADMIN — Medication 1 MILLIGRAM(S): at 08:28

## 2021-11-04 RX ADMIN — Medication 2 MILLIGRAM(S): at 16:38

## 2021-11-04 RX ADMIN — Medication 650 MILLIGRAM(S): at 20:32

## 2021-11-04 RX ADMIN — Medication 1 TABLET(S): at 08:29

## 2021-11-04 RX ADMIN — Medication 4 MILLIGRAM(S): at 20:35

## 2021-11-04 RX ADMIN — HALOPERIDOL DECANOATE 5 MILLIGRAM(S): 100 INJECTION INTRAMUSCULAR at 09:42

## 2021-11-04 RX ADMIN — Medication 1 PATCH: at 08:56

## 2021-11-04 RX ADMIN — LITHIUM CARBONATE 600 MILLIGRAM(S): 300 TABLET, EXTENDED RELEASE ORAL at 20:21

## 2021-11-04 RX ADMIN — Medication 50 MILLIGRAM(S): at 20:33

## 2021-11-04 RX ADMIN — HALOPERIDOL DECANOATE 5 MILLIGRAM(S): 100 INJECTION INTRAMUSCULAR at 16:38

## 2021-11-04 NOTE — BH INPATIENT PSYCHIATRY PROGRESS NOTE - NSBHASSESSSUMMFT_PSY_ALL_CORE
Patient admitted with SI to shoot himself.  Patient was severely agitated, needing IMs and seclusion first 2 days of admission.  Irritability has gradually decreased and insight is emerging.  Patient is unsure if agitation was motivated by delusions, paranoia, hallucinations, or other source.  Denies substance use contributing.      Continue risperdal 2mg QHS and zoloft 150mg daily.    Li to 900mg qHS, tolerating it well.    Agrees to risperidone BID and adding ZYprexa PRN as alternative to taking multiple Haldol, Ativan, Benadryl PRNs throughout the day.

## 2021-11-05 PROCEDURE — 99231 SBSQ HOSP IP/OBS SF/LOW 25: CPT

## 2021-11-05 RX ADMIN — Medication 0.5 MILLIGRAM(S): at 08:03

## 2021-11-05 RX ADMIN — RISPERIDONE 2 MILLIGRAM(S): 4 TABLET ORAL at 08:02

## 2021-11-05 RX ADMIN — NALTREXONE HYDROCHLORIDE 50 MILLIGRAM(S): 50 TABLET, FILM COATED ORAL at 08:03

## 2021-11-05 RX ADMIN — OLANZAPINE 2.5 MILLIGRAM(S): 15 TABLET, FILM COATED ORAL at 11:00

## 2021-11-05 RX ADMIN — OLANZAPINE 2.5 MILLIGRAM(S): 15 TABLET, FILM COATED ORAL at 16:06

## 2021-11-05 RX ADMIN — Medication 2 MILLIGRAM(S): at 20:34

## 2021-11-05 RX ADMIN — RISPERIDONE 2 MILLIGRAM(S): 4 TABLET ORAL at 20:14

## 2021-11-05 RX ADMIN — Medication 0.5 MILLIGRAM(S): at 20:14

## 2021-11-05 RX ADMIN — SERTRALINE 150 MILLIGRAM(S): 25 TABLET, FILM COATED ORAL at 08:02

## 2021-11-05 RX ADMIN — Medication 4 MILLIGRAM(S): at 20:35

## 2021-11-05 RX ADMIN — Medication 50 MILLIGRAM(S): at 08:11

## 2021-11-05 RX ADMIN — Medication 1 MILLIGRAM(S): at 08:03

## 2021-11-05 RX ADMIN — Medication 50 MILLIGRAM(S): at 08:02

## 2021-11-05 RX ADMIN — Medication 1 PATCH: at 08:01

## 2021-11-05 RX ADMIN — Medication 1 TABLET(S): at 08:02

## 2021-11-05 RX ADMIN — LITHIUM CARBONATE 600 MILLIGRAM(S): 300 TABLET, EXTENDED RELEASE ORAL at 20:14

## 2021-11-05 RX ADMIN — HALOPERIDOL DECANOATE 5 MILLIGRAM(S): 100 INJECTION INTRAMUSCULAR at 08:11

## 2021-11-05 RX ADMIN — Medication 4 MILLIGRAM(S): at 17:20

## 2021-11-05 RX ADMIN — OLANZAPINE 2.5 MILLIGRAM(S): 15 TABLET, FILM COATED ORAL at 20:34

## 2021-11-05 RX ADMIN — Medication 100 MILLIGRAM(S): at 20:14

## 2021-11-05 RX ADMIN — Medication 2 MILLIGRAM(S): at 08:11

## 2021-11-05 RX ADMIN — Medication 4 MILLIGRAM(S): at 06:58

## 2021-11-05 RX ADMIN — Medication 50 MILLIGRAM(S): at 20:35

## 2021-11-05 RX ADMIN — PANTOPRAZOLE SODIUM 40 MILLIGRAM(S): 20 TABLET, DELAYED RELEASE ORAL at 08:02

## 2021-11-05 NOTE — BH INPATIENT PSYCHIATRY PROGRESS NOTE - NSBHASSESSSUMMFT_PSY_ALL_CORE
Patient admitted with SI to shoot himself.  Patient was severely agitated, needing IMs and seclusion first 2 days of admission.  Irritability has gradually decreased and insight is emerging.  Patient is unsure if agitation was motivated by delusions, paranoia, hallucinations, or other source.  Denies substance use contributing.      Continue risperdal 2mg QHS and zoloft 150mg daily.    Li 900mg qHS, tolerating it well.    Agrees to risperidone BID and adding ZYprexa PRN as alternative to taking multiple Haldol, Ativan, Benadryl PRNs throughout the day.

## 2021-11-06 RX ADMIN — Medication 50 MILLIGRAM(S): at 17:10

## 2021-11-06 RX ADMIN — NALTREXONE HYDROCHLORIDE 50 MILLIGRAM(S): 50 TABLET, FILM COATED ORAL at 09:01

## 2021-11-06 RX ADMIN — Medication 1 PACKET(S): at 09:01

## 2021-11-06 RX ADMIN — RISPERIDONE 2 MILLIGRAM(S): 4 TABLET ORAL at 09:02

## 2021-11-06 RX ADMIN — PANTOPRAZOLE SODIUM 40 MILLIGRAM(S): 20 TABLET, DELAYED RELEASE ORAL at 09:01

## 2021-11-06 RX ADMIN — Medication 50 MILLIGRAM(S): at 21:28

## 2021-11-06 RX ADMIN — RISPERIDONE 2 MILLIGRAM(S): 4 TABLET ORAL at 20:04

## 2021-11-06 RX ADMIN — SERTRALINE 150 MILLIGRAM(S): 25 TABLET, FILM COATED ORAL at 09:02

## 2021-11-06 RX ADMIN — OLANZAPINE 2.5 MILLIGRAM(S): 15 TABLET, FILM COATED ORAL at 21:28

## 2021-11-06 RX ADMIN — LITHIUM CARBONATE 600 MILLIGRAM(S): 300 TABLET, EXTENDED RELEASE ORAL at 20:04

## 2021-11-06 RX ADMIN — OLANZAPINE 2.5 MILLIGRAM(S): 15 TABLET, FILM COATED ORAL at 09:09

## 2021-11-06 RX ADMIN — Medication 50 MILLIGRAM(S): at 09:11

## 2021-11-06 RX ADMIN — Medication 0.5 MILLIGRAM(S): at 09:01

## 2021-11-06 RX ADMIN — Medication 100 MILLIGRAM(S): at 20:04

## 2021-11-06 RX ADMIN — Medication 4 MILLIGRAM(S): at 17:10

## 2021-11-06 RX ADMIN — Medication 1 PATCH: at 09:02

## 2021-11-06 RX ADMIN — Medication 2 MILLIGRAM(S): at 09:10

## 2021-11-06 RX ADMIN — Medication 2 MILLIGRAM(S): at 20:13

## 2021-11-06 RX ADMIN — Medication 4 MILLIGRAM(S): at 20:05

## 2021-11-06 RX ADMIN — OLANZAPINE 2.5 MILLIGRAM(S): 15 TABLET, FILM COATED ORAL at 17:10

## 2021-11-06 RX ADMIN — Medication 50 MILLIGRAM(S): at 09:02

## 2021-11-06 RX ADMIN — Medication 0.5 MILLIGRAM(S): at 20:04

## 2021-11-07 RX ADMIN — Medication 2 MILLIGRAM(S): at 08:14

## 2021-11-07 RX ADMIN — Medication 4 MILLIGRAM(S): at 05:44

## 2021-11-07 RX ADMIN — Medication 1 PACKET(S): at 08:06

## 2021-11-07 RX ADMIN — Medication 50 MILLIGRAM(S): at 20:26

## 2021-11-07 RX ADMIN — Medication 4 MILLIGRAM(S): at 09:21

## 2021-11-07 RX ADMIN — NALTREXONE HYDROCHLORIDE 50 MILLIGRAM(S): 50 TABLET, FILM COATED ORAL at 08:06

## 2021-11-07 RX ADMIN — OLANZAPINE 2.5 MILLIGRAM(S): 15 TABLET, FILM COATED ORAL at 20:26

## 2021-11-07 RX ADMIN — OLANZAPINE 2.5 MILLIGRAM(S): 15 TABLET, FILM COATED ORAL at 16:53

## 2021-11-07 RX ADMIN — Medication 1 PATCH: at 08:07

## 2021-11-07 RX ADMIN — Medication 50 MILLIGRAM(S): at 09:19

## 2021-11-07 RX ADMIN — Medication 100 MILLIGRAM(S): at 20:18

## 2021-11-07 RX ADMIN — Medication 50 MILLIGRAM(S): at 08:06

## 2021-11-07 RX ADMIN — Medication 2 MILLIGRAM(S): at 16:53

## 2021-11-07 RX ADMIN — SERTRALINE 150 MILLIGRAM(S): 25 TABLET, FILM COATED ORAL at 08:07

## 2021-11-07 RX ADMIN — OLANZAPINE 2.5 MILLIGRAM(S): 15 TABLET, FILM COATED ORAL at 08:14

## 2021-11-07 RX ADMIN — Medication 50 MILLIGRAM(S): at 16:53

## 2021-11-07 RX ADMIN — Medication 650 MILLIGRAM(S): at 06:45

## 2021-11-07 RX ADMIN — Medication 650 MILLIGRAM(S): at 05:44

## 2021-11-07 RX ADMIN — Medication 4 MILLIGRAM(S): at 20:26

## 2021-11-07 RX ADMIN — HALOPERIDOL DECANOATE 5 MILLIGRAM(S): 100 INJECTION INTRAMUSCULAR at 09:19

## 2021-11-07 RX ADMIN — Medication 1 PATCH: at 09:29

## 2021-11-07 RX ADMIN — LITHIUM CARBONATE 600 MILLIGRAM(S): 300 TABLET, EXTENDED RELEASE ORAL at 20:18

## 2021-11-07 RX ADMIN — RISPERIDONE 2 MILLIGRAM(S): 4 TABLET ORAL at 08:05

## 2021-11-07 RX ADMIN — Medication 50 MILLIGRAM(S): at 08:14

## 2021-11-07 RX ADMIN — RISPERIDONE 2 MILLIGRAM(S): 4 TABLET ORAL at 20:18

## 2021-11-07 RX ADMIN — Medication 4 MILLIGRAM(S): at 16:53

## 2021-11-07 RX ADMIN — Medication 0.5 MILLIGRAM(S): at 08:05

## 2021-11-07 RX ADMIN — HALOPERIDOL DECANOATE 5 MILLIGRAM(S): 100 INJECTION INTRAMUSCULAR at 18:40

## 2021-11-07 RX ADMIN — Medication 0.5 MILLIGRAM(S): at 20:18

## 2021-11-08 PROCEDURE — 99232 SBSQ HOSP IP/OBS MODERATE 35: CPT

## 2021-11-08 RX ORDER — PALIPERIDONE 1.5 MG/1
234 TABLET, EXTENDED RELEASE ORAL ONCE
Refills: 0 | Status: COMPLETED | OUTPATIENT
Start: 2021-11-08 | End: 2021-11-08

## 2021-11-08 RX ADMIN — OLANZAPINE 2.5 MILLIGRAM(S): 15 TABLET, FILM COATED ORAL at 09:14

## 2021-11-08 RX ADMIN — Medication 50 MILLIGRAM(S): at 08:57

## 2021-11-08 RX ADMIN — PALIPERIDONE 234 MILLIGRAM(S): 1.5 TABLET, EXTENDED RELEASE ORAL at 17:01

## 2021-11-08 RX ADMIN — Medication 50 MILLIGRAM(S): at 18:12

## 2021-11-08 RX ADMIN — Medication 1 PACKET(S): at 08:57

## 2021-11-08 RX ADMIN — RISPERIDONE 2 MILLIGRAM(S): 4 TABLET ORAL at 20:26

## 2021-11-08 RX ADMIN — NALTREXONE HYDROCHLORIDE 50 MILLIGRAM(S): 50 TABLET, FILM COATED ORAL at 08:57

## 2021-11-08 RX ADMIN — PANTOPRAZOLE SODIUM 40 MILLIGRAM(S): 20 TABLET, DELAYED RELEASE ORAL at 08:56

## 2021-11-08 RX ADMIN — Medication 4 MILLIGRAM(S): at 13:22

## 2021-11-08 RX ADMIN — Medication 4 MILLIGRAM(S): at 08:57

## 2021-11-08 RX ADMIN — Medication 2 MILLIGRAM(S): at 13:22

## 2021-11-08 RX ADMIN — LITHIUM CARBONATE 600 MILLIGRAM(S): 300 TABLET, EXTENDED RELEASE ORAL at 20:25

## 2021-11-08 RX ADMIN — Medication 2 MILLIGRAM(S): at 03:22

## 2021-11-08 RX ADMIN — OLANZAPINE 2.5 MILLIGRAM(S): 15 TABLET, FILM COATED ORAL at 13:22

## 2021-11-08 RX ADMIN — OLANZAPINE 2.5 MILLIGRAM(S): 15 TABLET, FILM COATED ORAL at 20:32

## 2021-11-08 RX ADMIN — SERTRALINE 150 MILLIGRAM(S): 25 TABLET, FILM COATED ORAL at 08:57

## 2021-11-08 RX ADMIN — Medication 4 MILLIGRAM(S): at 20:26

## 2021-11-08 RX ADMIN — Medication 4 MILLIGRAM(S): at 18:13

## 2021-11-08 RX ADMIN — Medication 100 MILLIGRAM(S): at 20:25

## 2021-11-08 RX ADMIN — Medication 0.5 MILLIGRAM(S): at 20:26

## 2021-11-08 RX ADMIN — Medication 2 MILLIGRAM(S): at 20:32

## 2021-11-08 RX ADMIN — Medication 0.5 MILLIGRAM(S): at 08:56

## 2021-11-08 RX ADMIN — RISPERIDONE 2 MILLIGRAM(S): 4 TABLET ORAL at 08:56

## 2021-11-08 RX ADMIN — Medication 1 PATCH: at 08:58

## 2021-11-08 NOTE — BH INPATIENT PSYCHIATRY PROGRESS NOTE - NSBHASSESSSUMMFT_PSY_ALL_CORE
Patient admitted with SI to shoot himself.  Patient was severely agitated, needing IMs and seclusion first 2 days of admission.  Irritability has gradually decreased and insight is emerging.  Patient is unsure if agitation was motivated by delusions, paranoia, hallucinations, or other source.  Denies substance use contributing.      Continue risperdal TID and zoloft 150mg daily.  Invega Sustena 234mg.   Li 900mg qHS, tolerating it well.    ZYprexa PRN as alternative to taking multiple Haldol, Ativan, Benadryl PRNs throughout the day.

## 2021-11-09 VITALS — DIASTOLIC BLOOD PRESSURE: 79 MMHG | SYSTOLIC BLOOD PRESSURE: 124 MMHG | HEART RATE: 77 BPM | TEMPERATURE: 97 F

## 2021-11-09 LAB
A1C WITH ESTIMATED AVERAGE GLUCOSE RESULT: 5 % — SIGNIFICANT CHANGE UP (ref 4–5.6)
ANION GAP SERPL CALC-SCNC: 10 MMOL/L — SIGNIFICANT CHANGE UP (ref 7–14)
BUN SERPL-MCNC: 14 MG/DL — SIGNIFICANT CHANGE UP (ref 7–23)
CALCIUM SERPL-MCNC: 9.3 MG/DL — SIGNIFICANT CHANGE UP (ref 8.4–10.5)
CHLORIDE SERPL-SCNC: 103 MMOL/L — SIGNIFICANT CHANGE UP (ref 98–107)
CHOLEST SERPL-MCNC: 208 MG/DL — HIGH
CO2 SERPL-SCNC: 24 MMOL/L — SIGNIFICANT CHANGE UP (ref 22–31)
CREAT SERPL-MCNC: 1.26 MG/DL — SIGNIFICANT CHANGE UP (ref 0.5–1.3)
ESTIMATED AVERAGE GLUCOSE: 97 — SIGNIFICANT CHANGE UP
GLUCOSE SERPL-MCNC: 98 MG/DL — SIGNIFICANT CHANGE UP (ref 70–99)
HDLC SERPL-MCNC: 42 MG/DL — SIGNIFICANT CHANGE UP
LIPID PNL WITH DIRECT LDL SERPL: 50 MG/DL — SIGNIFICANT CHANGE UP
LITHIUM SERPL-MCNC: 0.2 MMOL/L — LOW (ref 0.6–1.2)
NON HDL CHOLESTEROL: 166 MG/DL — HIGH
POTASSIUM SERPL-MCNC: 4 MMOL/L — SIGNIFICANT CHANGE UP (ref 3.5–5.3)
POTASSIUM SERPL-SCNC: 4 MMOL/L — SIGNIFICANT CHANGE UP (ref 3.5–5.3)
SODIUM SERPL-SCNC: 137 MMOL/L — SIGNIFICANT CHANGE UP (ref 135–145)
TRIGL SERPL-MCNC: 579 MG/DL — HIGH
TSH SERPL-MCNC: 3.65 UIU/ML — SIGNIFICANT CHANGE UP (ref 0.27–4.2)

## 2021-11-09 PROCEDURE — 99239 HOSP IP/OBS DSCHRG MGMT >30: CPT

## 2021-11-09 RX ORDER — NALTREXONE HYDROCHLORIDE 50 MG/1
1 TABLET, FILM COATED ORAL
Qty: 30 | Refills: 0
Start: 2021-11-09 | End: 2021-12-08

## 2021-11-09 RX ORDER — PSYLLIUM SEED (WITH DEXTROSE)
1 POWDER (GRAM) ORAL
Qty: 0 | Refills: 0 | DISCHARGE
Start: 2021-11-09

## 2021-11-09 RX ORDER — TRAZODONE HCL 50 MG
1 TABLET ORAL
Qty: 30 | Refills: 0
Start: 2021-11-09 | End: 2021-12-08

## 2021-11-09 RX ORDER — FOLIC ACID 0.8 MG
1 TABLET ORAL
Qty: 14 | Refills: 0
Start: 2021-11-09 | End: 2021-11-22

## 2021-11-09 RX ORDER — LITHIUM CARBONATE 300 MG/1
1 TABLET, EXTENDED RELEASE ORAL
Qty: 14 | Refills: 0
Start: 2021-11-09 | End: 2021-11-22

## 2021-11-09 RX ORDER — NALTREXONE HYDROCHLORIDE 50 MG/1
1 TABLET, FILM COATED ORAL
Qty: 0 | Refills: 0 | DISCHARGE

## 2021-11-09 RX ORDER — BENZTROPINE MESYLATE 1 MG
1 TABLET ORAL
Qty: 60 | Refills: 0
Start: 2021-11-09 | End: 2021-12-08

## 2021-11-09 RX ORDER — SERTRALINE 25 MG/1
3 TABLET, FILM COATED ORAL
Qty: 90 | Refills: 0
Start: 2021-11-09 | End: 2021-12-08

## 2021-11-09 RX ORDER — TOPIRAMATE 25 MG
1 TABLET ORAL
Qty: 30 | Refills: 0
Start: 2021-11-09 | End: 2021-12-08

## 2021-11-09 RX ORDER — RISPERIDONE 4 MG/1
1 TABLET ORAL
Qty: 0 | Refills: 0 | DISCHARGE

## 2021-11-09 RX ORDER — PANTOPRAZOLE SODIUM 20 MG/1
1 TABLET, DELAYED RELEASE ORAL
Qty: 0 | Refills: 0 | DISCHARGE

## 2021-11-09 RX ORDER — PANTOPRAZOLE SODIUM 20 MG/1
1 TABLET, DELAYED RELEASE ORAL
Qty: 30 | Refills: 0
Start: 2021-11-09 | End: 2021-12-08

## 2021-11-09 RX ORDER — RISPERIDONE 4 MG/1
1 TABLET ORAL
Qty: 30 | Refills: 0
Start: 2021-11-09 | End: 2021-12-08

## 2021-11-09 RX ORDER — TOPIRAMATE 25 MG
1 TABLET ORAL
Qty: 0 | Refills: 0 | DISCHARGE

## 2021-11-09 RX ADMIN — Medication 4 MILLIGRAM(S): at 06:50

## 2021-11-09 RX ADMIN — Medication 50 MILLIGRAM(S): at 08:38

## 2021-11-09 RX ADMIN — OLANZAPINE 2.5 MILLIGRAM(S): 15 TABLET, FILM COATED ORAL at 06:49

## 2021-11-09 RX ADMIN — OLANZAPINE 2.5 MILLIGRAM(S): 15 TABLET, FILM COATED ORAL at 11:18

## 2021-11-09 RX ADMIN — Medication 0.5 MILLIGRAM(S): at 08:39

## 2021-11-09 RX ADMIN — Medication 50 MILLIGRAM(S): at 06:50

## 2021-11-09 RX ADMIN — Medication 4 MILLIGRAM(S): at 11:24

## 2021-11-09 RX ADMIN — Medication 1 PATCH: at 09:02

## 2021-11-09 RX ADMIN — SERTRALINE 150 MILLIGRAM(S): 25 TABLET, FILM COATED ORAL at 08:37

## 2021-11-09 RX ADMIN — HALOPERIDOL DECANOATE 5 MILLIGRAM(S): 100 INJECTION INTRAMUSCULAR at 11:19

## 2021-11-09 RX ADMIN — Medication 2 MILLIGRAM(S): at 06:49

## 2021-11-09 RX ADMIN — NALTREXONE HYDROCHLORIDE 50 MILLIGRAM(S): 50 TABLET, FILM COATED ORAL at 08:38

## 2021-11-09 RX ADMIN — RISPERIDONE 2 MILLIGRAM(S): 4 TABLET ORAL at 08:38

## 2021-11-09 RX ADMIN — Medication 1 PATCH: at 08:36

## 2021-11-09 NOTE — BH INPATIENT PSYCHIATRY PROGRESS NOTE - NSBHINPTBILLING_PSY_ALL_CORE
78432 - Inpatient Low Complexity
55423 - Inpatient Low Complexity
53600 - Hospital Discharge Day Management; more than 30 min
89815 - Inpatient Low Complexity
28537 - Inpatient Moderate Complexity
18591 - Inpatient Moderate Complexity
67537 - Inpatient Moderate Complexity
45894 - Inpatient Moderate Complexity
23089 - Inpatient Low Complexity

## 2021-11-09 NOTE — BH INPATIENT PSYCHIATRY DISCHARGE NOTE - HPI (INCLUDE ILLNESS QUALITY, SEVERITY, DURATION, TIMING, CONTEXT, MODIFYING FACTORS, ASSOCIATED SIGNS AND SYMPTOMS)
45 y/o male with PMH of anxiety, depression, schizoaffective disorder prer prior [facility] records, alcohol abuse and GERD BIBEMS to [facility] ed for complaints of suicidal ideation.  Pt states that he wanted to "blow his brains out with a gun". States he does not own a gun, but states he feels he knows he could buy one off the street.   Denies HI.   Currently denies suicidal ideation in the hospital and states he has no plan or intent while here.  he states he came to the ed for help and wants to get better and is hopeful.   Pt was d/c from [facility] 4 days ago after a 19 day admission for alcohol rehab. UTox negative. States he was prescribed risperidone, Trazadone, zoloft, Topamax, naltrexone, and Protonix at [facility] but was never previously on these medications. Compliant with medications since he was prescribed.   States he was prescribed many medications in the past but does not remember them all.  States he believes he was hospitalized in inpatient psychiatry prior but does not remember when or what it was for.  He was at [facility] in 2016.   he states he had a suicide attempt but could not tell the writer what happened or when it was.       Pt came to the ED yesterday after he left his sober home ([facility]) and drank 12 beers. Admits to drinking around 12 beers every other day. Denies illicit drug use since he was arrested in January for cocaine and paraphernalia. Before his admission to [facility], pt was homeless and was placed in [facility] upon discharge. Also admits to hearing ringing in his ears for years.   States he has been having poor sleep since discharged.  He reports increased appetite; gained 6 lbs in one month.   patient states his mood is down but is hopeful.   He reports some anergia.    He denies manic symptoms.    Patient denies hallucinations or overt delusions, but his thought process was concrete and mildly disorganized on exam.    CIWA was completed and is currently 0

## 2021-11-09 NOTE — BH INPATIENT PSYCHIATRY PROGRESS NOTE - NSTXDEPRESINTERMD_PSY_ALL_CORE
will start zoloft 100mg daily

## 2021-11-09 NOTE — BH INPATIENT PSYCHIATRY DISCHARGE NOTE - NSBHMETABOLIC_PSY_ALL_CORE_FT
BMI: BMI (kg/m2): 36.2 (10-29-21 @ 17:45)  HbA1c: A1C with Estimated Average Glucose Result: 5.0 % (11-09-21 @ 11:08)    Glucose:   BP: 124/79 (11-09-21 @ 06:28) (111/62 - 141/89)  Lipid Panel: Date/Time: 11-09-21 @ 11:08  Cholesterol, Serum: 208  Direct LDL: --  HDL Cholesterol, Serum: 42  Total Cholesterol/HDL Ration Measurement: --  Triglycerides, Serum: 579

## 2021-11-09 NOTE — BH DISCHARGE NOTE NURSING/SOCIAL WORK/PSYCH REHAB - DISCHARGE INSTRUCTIONS AFTERCARE APPOINTMENTS
In order to check the location, date, or time of your aftercare appointment, please refer to your Discharge Instructions Document given to you upon leaving the hospital.  If you have lost the instructions please call [phone]

## 2021-11-09 NOTE — BH INPATIENT PSYCHIATRY PROGRESS NOTE - NSICDXBHPRIMARYDX_PSY_ALL_CORE
Depression   F32.A  

## 2021-11-09 NOTE — BH INPATIENT PSYCHIATRY PROGRESS NOTE - NSTXDEPRESDATEEST_PSY_ALL_CORE
29-Oct-2021

## 2021-11-09 NOTE — BH INPATIENT PSYCHIATRY PROGRESS NOTE - NSBHCHARTREVIEWVS_PSY_A_CORE FT
Vital Signs Last 24 Hrs  T(C): 36.1 (11-02-21 @ 06:51), Max: 36.7 (11-01-21 @ 21:03)  T(F): 97 (11-02-21 @ 06:51), Max: 98 (11-01-21 @ 21:03)  HR: --  BP: --  BP(mean): --  RR: --  SpO2: --    Orthostatic VS  11-02-21 @ 06:51  Lying BP: --/-- HR: --  Sitting BP: 122/75 HR: 72  Standing BP: 118/77 HR: 83  Site: --  Mode: --  Orthostatic VS  11-01-21 @ 21:03  Lying BP: --/-- HR: --  Sitting BP: --/-- HR: --  Standing BP: 138/88 HR: 77  Site: --  Mode: --  
Vital Signs Last 24 Hrs  T(C): 36.8 (10-30-21 @ 08:23), Max: 36.9 (10-29-21 @ 15:35)  T(F): 98.2 (10-30-21 @ 08:23), Max: 98.4 (10-29-21 @ 15:35)  HR: 78 (10-30-21 @ 08:23) (78 - 78)  BP: 136/70 (10-30-21 @ 08:23) (132/86 - 136/70)  BP(mean): --  RR: 18 (10-29-21 @ 15:35) (18 - 18)  SpO2: 97% (10-29-21 @ 15:35) (97% - 97%)    Orthostatic VS  10-29-21 @ 18:21  Lying BP: --/-- HR: --  Sitting BP: 130/87 HR: 67  Standing BP: 125/79 HR: 75  Site: --  Mode: --  
Vital Signs Last 24 Hrs  T(C): 36.4 (11-05-21 @ 07:35), Max: 36.4 (11-05-21 @ 07:35)  T(F): 97.5 (11-05-21 @ 07:35), Max: 97.5 (11-05-21 @ 07:35)  HR: --  BP: --  BP(mean): --  RR: --  SpO2: --    Orthostatic VS  11-05-21 @ 07:35  Lying BP: --/-- HR: --  Sitting BP: 120/66 HR: 75  Standing BP: 125/76 HR: 81  Site: --  Mode: --  Orthostatic VS  11-04-21 @ 19:17  Lying BP: --/-- HR: --  Sitting BP: 126/78 HR: 75  Standing BP: 125/70 HR: 82  Site: --  Mode: --  
Vital Signs Last 24 Hrs  T(C): 36.3 (11-09-21 @ 06:28), Max: 36.3 (11-09-21 @ 06:28)  T(F): 97.4 (11-09-21 @ 06:28), Max: 97.4 (11-09-21 @ 06:28)  HR: 77 (11-09-21 @ 06:28) (77 - 77)  BP: 124/79 (11-09-21 @ 06:28) (124/79 - 124/79)  BP(mean): --  RR: --  SpO2: --    
Vital Signs Last 24 Hrs  T(C): 36.4 (11-08-21 @ 08:06), Max: 36.5 (11-08-21 @ 07:34)  T(F): 97.5 (11-08-21 @ 08:06), Max: 97.7 (11-08-21 @ 07:34)  HR: 120 (11-08-21 @ 08:06) (62 - 120)  BP: 125/85 (11-08-21 @ 08:06) (111/62 - 125/85)  BP(mean): --  RR: --  SpO2: --    Orthostatic VS  11-06-21 @ 19:17  Lying BP: --/-- HR: --  Sitting BP: 133/80 HR: 92  Standing BP: 125/71 HR: 96  Site: --  Mode: --  
Vital Signs Last 24 Hrs  T(C): 36.7 (10-31-21 @ 06:34), Max: 36.7 (10-31-21 @ 06:34)  T(F): 98 (10-31-21 @ 06:34), Max: 98 (10-31-21 @ 06:34)  HR: 81 (10-31-21 @ 06:34) (81 - 84)  BP: 123/73 (10-31-21 @ 06:34) (123/70 - 123/73)  BP(mean): --  RR: --  SpO2: --    Orthostatic VS  10-29-21 @ 18:21  Lying BP: --/-- HR: --  Sitting BP: 130/87 HR: 67  Standing BP: 125/79 HR: 75  Site: --  Mode: --  
Vital Signs Last 24 Hrs  T(C): 36.4 (11-04-21 @ 08:41), Max: 36.4 (11-04-21 @ 08:41)  T(F): 97.5 (11-04-21 @ 08:41), Max: 97.5 (11-04-21 @ 08:41)  HR: 64 (11-04-21 @ 08:41) (64 - 64)  BP: 104/63 (11-04-21 @ 08:41) (104/63 - 104/63)  BP(mean): --  RR: --  SpO2: --    
Vital Signs Last 24 Hrs  T(C): 36.8 (11-03-21 @ 07:47), Max: 36.8 (11-03-21 @ 07:47)  T(F): 98.3 (11-03-21 @ 07:47), Max: 98.3 (11-03-21 @ 07:47)  HR: 72 (11-03-21 @ 07:47) (72 - 72)  BP: 103/67 (11-03-21 @ 07:47) (103/67 - 103/67)  BP(mean): --  RR: --  SpO2: --    Orthostatic VS  11-02-21 @ 06:51  Lying BP: --/-- HR: --  Sitting BP: 122/75 HR: 72  Standing BP: 118/77 HR: 83  Site: --  Mode: --  Orthostatic VS  11-01-21 @ 21:03  Lying BP: --/-- HR: --  Sitting BP: --/-- HR: --  Standing BP: 138/88 HR: 77  Site: --  Mode: --  
Vital Signs Last 24 Hrs  T(C): 36.3 (11-01-21 @ 07:15), Max: 36.7 (10-31-21 @ 21:51)  T(F): 97.4 (11-01-21 @ 07:15), Max: 98 (10-31-21 @ 21:51)  HR: 69 (11-01-21 @ 07:15) (69 - 88)  BP: 110/60 (11-01-21 @ 07:15) (110/60 - 126/80)  BP(mean): --  RR: --  SpO2: --

## 2021-11-09 NOTE — BH INPATIENT PSYCHIATRY PROGRESS NOTE - PRN MEDS
MEDICATIONS  (PRN):  acetaminophen     Tablet .. 650 milliGRAM(s) Oral every 6 hours PRN Temp greater or equal to 38C (100.4F), Mild Pain (1 - 3), Moderate Pain (4 - 6)  diphenhydrAMINE 50 milliGRAM(s) Oral every 4 hours PRN agitation  diphenhydrAMINE Injectable 25 milliGRAM(s) IntraMuscular once PRN severe agitation  haloperidol     Tablet 5 milliGRAM(s) Oral every 4 hours PRN agitation  haloperidol    Injectable 7.5 milliGRAM(s) IntraMuscular once PRN severe agitation  hydrOXYzine hydrochloride 50 milliGRAM(s) Oral every 6 hours PRN anxiety  LORazepam     Tablet 2 milliGRAM(s) Oral every 8 hours PRN agitation  nicotine  Polacrilex Gum 4 milliGRAM(s) Oral every 2 hours PRN smoking cessation  OLANZapine 2.5 milliGRAM(s) Oral every 4 hours PRN anxiety  
MEDICATIONS  (PRN):  acetaminophen     Tablet .. 650 milliGRAM(s) Oral every 6 hours PRN Temp greater or equal to 38C (100.4F), Mild Pain (1 - 3), Moderate Pain (4 - 6)  diphenhydrAMINE 50 milliGRAM(s) Oral every 4 hours PRN agitation  diphenhydrAMINE Injectable 25 milliGRAM(s) IntraMuscular once PRN severe agitation  haloperidol     Tablet 5 milliGRAM(s) Oral every 4 hours PRN agitation  haloperidol    Injectable 7.5 milliGRAM(s) IntraMuscular once PRN severe agitation  hydrOXYzine hydrochloride 50 milliGRAM(s) Oral every 6 hours PRN anxiety  LORazepam     Tablet 2 milliGRAM(s) Oral every 8 hours PRN agitation  LORazepam   Injectable 3 milliGRAM(s) IntraMuscular once PRN severe agitation  nicotine  Polacrilex Gum 4 milliGRAM(s) Oral every 2 hours PRN smoking cessation  OLANZapine 2.5 milliGRAM(s) Oral every 4 hours PRN anxiety  
MEDICATIONS  (PRN):  acetaminophen     Tablet .. 650 milliGRAM(s) Oral every 6 hours PRN Temp greater or equal to 38C (100.4F), Mild Pain (1 - 3), Moderate Pain (4 - 6)  diphenhydrAMINE 50 milliGRAM(s) Oral every 4 hours PRN agitation  diphenhydrAMINE Injectable 25 milliGRAM(s) IntraMuscular once PRN severe agitation  haloperidol     Tablet 5 milliGRAM(s) Oral every 4 hours PRN agitation  haloperidol    Injectable 7.5 milliGRAM(s) IntraMuscular once PRN severe agitation  hydrOXYzine hydrochloride 50 milliGRAM(s) Oral every 6 hours PRN anxiety  LORazepam     Tablet 2 milliGRAM(s) Oral every 4 hours PRN agitation  LORazepam   Injectable 3 milliGRAM(s) IntraMuscular once PRN severe agitation  nicotine  Polacrilex Gum 4 milliGRAM(s) Oral every 2 hours PRN smoking cessation  
MEDICATIONS  (PRN):  acetaminophen     Tablet .. 650 milliGRAM(s) Oral every 6 hours PRN Temp greater or equal to 38C (100.4F), Mild Pain (1 - 3), Moderate Pain (4 - 6)  diphenhydrAMINE 50 milliGRAM(s) Oral every 4 hours PRN agitation  diphenhydrAMINE Injectable 25 milliGRAM(s) IntraMuscular once PRN severe agitation  haloperidol     Tablet 5 milliGRAM(s) Oral every 4 hours PRN agitation  haloperidol    Injectable 7.5 milliGRAM(s) IntraMuscular once PRN severe agitation  hydrOXYzine hydrochloride 50 milliGRAM(s) Oral every 6 hours PRN anxiety  LORazepam     Tablet 2 milliGRAM(s) Oral every 2 hours PRN CIWA score increase by 2 points and current CIWA score GREATER THAN 9  LORazepam     Tablet 2 milliGRAM(s) Oral every 4 hours PRN agitation  LORazepam   Injectable 3 milliGRAM(s) IntraMuscular once PRN severe agitation  LORazepam   Injectable 2 milliGRAM(s) IntraMuscular every 2 hours PRN CIWA score increase by 2 points and current CIWA score GREATER THAN 9  nicotine  Polacrilex Gum 4 milliGRAM(s) Oral every 2 hours PRN smoking cessation  
MEDICATIONS  (PRN):  hydrOXYzine hydrochloride 50 milliGRAM(s) Oral every 6 hours PRN anxiety  LORazepam     Tablet 2 milliGRAM(s) Oral every 2 hours PRN CIWA score increase by 2 points and current CIWA score GREATER THAN 9  LORazepam   Injectable 2 milliGRAM(s) IntraMuscular every 2 hours PRN CIWA score increase by 2 points and current CIWA score GREATER THAN 9  
MEDICATIONS  (PRN):  acetaminophen     Tablet .. 650 milliGRAM(s) Oral every 6 hours PRN Temp greater or equal to 38C (100.4F), Mild Pain (1 - 3), Moderate Pain (4 - 6)  diphenhydrAMINE 50 milliGRAM(s) Oral every 4 hours PRN agitation  diphenhydrAMINE Injectable 25 milliGRAM(s) IntraMuscular once PRN severe agitation  haloperidol     Tablet 5 milliGRAM(s) Oral every 4 hours PRN agitation  haloperidol    Injectable 7.5 milliGRAM(s) IntraMuscular once PRN severe agitation  hydrOXYzine hydrochloride 50 milliGRAM(s) Oral every 6 hours PRN anxiety  LORazepam     Tablet 2 milliGRAM(s) Oral every 4 hours PRN agitation  LORazepam     Tablet 2 milliGRAM(s) Oral every 2 hours PRN CIWA score increase by 2 points and current CIWA score GREATER THAN 9  LORazepam   Injectable 2 milliGRAM(s) IntraMuscular every 2 hours PRN CIWA score increase by 2 points and current CIWA score GREATER THAN 9  LORazepam   Injectable 3 milliGRAM(s) IntraMuscular once PRN severe agitation  nicotine  Polacrilex Gum 4 milliGRAM(s) Oral every 2 hours PRN smoking cessation  nicotine -  14 mG/24Hr(s) Patch 1 patch Transdermal daily PRN nicotine dependence  
MEDICATIONS  (PRN):  acetaminophen     Tablet .. 650 milliGRAM(s) Oral every 6 hours PRN Temp greater or equal to 38C (100.4F), Mild Pain (1 - 3), Moderate Pain (4 - 6)  diphenhydrAMINE 50 milliGRAM(s) Oral every 4 hours PRN agitation  diphenhydrAMINE Injectable 25 milliGRAM(s) IntraMuscular once PRN severe agitation  haloperidol     Tablet 5 milliGRAM(s) Oral every 4 hours PRN agitation  haloperidol    Injectable 7.5 milliGRAM(s) IntraMuscular once PRN severe agitation  hydrOXYzine hydrochloride 50 milliGRAM(s) Oral every 6 hours PRN anxiety  LORazepam     Tablet 2 milliGRAM(s) Oral every 4 hours PRN agitation  LORazepam   Injectable 3 milliGRAM(s) IntraMuscular once PRN severe agitation  nicotine  Polacrilex Gum 4 milliGRAM(s) Oral every 2 hours PRN smoking cessation  nicotine -  14 mG/24Hr(s) Patch 1 patch Transdermal daily PRN nicotine dependence  
MEDICATIONS  (PRN):  acetaminophen     Tablet .. 650 milliGRAM(s) Oral every 6 hours PRN Temp greater or equal to 38C (100.4F), Mild Pain (1 - 3), Moderate Pain (4 - 6)  diphenhydrAMINE 50 milliGRAM(s) Oral every 4 hours PRN agitation  diphenhydrAMINE Injectable 25 milliGRAM(s) IntraMuscular once PRN severe agitation  haloperidol     Tablet 5 milliGRAM(s) Oral every 4 hours PRN agitation  haloperidol    Injectable 7.5 milliGRAM(s) IntraMuscular once PRN severe agitation  hydrOXYzine hydrochloride 50 milliGRAM(s) Oral every 6 hours PRN anxiety  LORazepam     Tablet 2 milliGRAM(s) Oral every 2 hours PRN CIWA score increase by 2 points and current CIWA score GREATER THAN 9  LORazepam     Tablet 2 milliGRAM(s) Oral every 4 hours PRN agitation  LORazepam   Injectable 3 milliGRAM(s) IntraMuscular once PRN severe agitation  LORazepam   Injectable 2 milliGRAM(s) IntraMuscular every 2 hours PRN CIWA score increase by 2 points and current CIWA score GREATER THAN 9  nicotine  Polacrilex Gum 4 milliGRAM(s) Oral every 2 hours PRN smoking cessation  
MEDICATIONS  (PRN):  acetaminophen     Tablet .. 650 milliGRAM(s) Oral every 6 hours PRN Temp greater or equal to 38C (100.4F), Mild Pain (1 - 3), Moderate Pain (4 - 6)  diphenhydrAMINE 50 milliGRAM(s) Oral every 4 hours PRN agitation  diphenhydrAMINE Injectable 25 milliGRAM(s) IntraMuscular once PRN severe agitation  haloperidol     Tablet 5 milliGRAM(s) Oral every 4 hours PRN agitation  haloperidol    Injectable 7.5 milliGRAM(s) IntraMuscular once PRN severe agitation  hydrOXYzine hydrochloride 50 milliGRAM(s) Oral every 6 hours PRN anxiety  LORazepam     Tablet 2 milliGRAM(s) Oral every 8 hours PRN agitation  nicotine  Polacrilex Gum 4 milliGRAM(s) Oral every 2 hours PRN smoking cessation  OLANZapine 2.5 milliGRAM(s) Oral every 4 hours PRN anxiety

## 2021-11-09 NOTE — BH INPATIENT PSYCHIATRY PROGRESS NOTE - NSBHCONSBHPROVCNTCTNOFT_PSY_A_CORE
primary team to contact [facility] on monday
primary team to contact [facility] on monday
primary team to contact [facility] on monday
primary team to contact [facility] on monday
primary team to contact [facility] on monday
primary team to contact [facility] on monday
primary team to contact [facility] on monday
primary team to contact [facility] on monday
primary team to contact [facility] on monday

## 2021-11-09 NOTE — BH DISCHARGE NOTE NURSING/SOCIAL WORK/PSYCH REHAB - NSBHADVANCE_PSY_ALL_CORE_FT
An Advanced Directive is a type of written or verbal instruction about health care to be followed if a person becomes unable to make decisions regarding his or her medical or psychiatric treatment. A surrogate decision maker, also known as a health care proxy/agent, is a person who may act as a decision maker for an incapable person.  Patient was provided with information and declined to complete an Advance Directive/identify a Surrogate Decision Maker.

## 2021-11-09 NOTE — BH DISCHARGE NOTE NURSING/SOCIAL WORK/PSYCH REHAB - NSDCPRGOAL_PSY_ALL_CORE
Psych rehab goal was addressed in this hospitalization as it relates to identifying coping skills for better sxs management. Pt is able to identify exercising, going out for fresh air, Yoga, reading and attending [facility] meeting as his coping strategies to distract him from negative thoughts and depressive feelings. Pt reports improvements in his mood, sleep and appetite. Pt is future oriented and states that he is ready for discharge. Pt also reports importance of staying away from substance and expresses that developing a healthy lifestyle is his goal. Pt reports that he has people from [facility] and [facility] meeting to reach out for support after discharge. Pt is compliant with medications and tolerating them well. Pt denies SI/HI/AH/VH. Pt is in good behavioral control.  Pt is more visible and social on the unit. Pt attends 70% of psych rehab groups. Pt is able to verbalize his feelings and thoughts in groups with encouragement. Pt also utilizes group to develop coping skills.  Pt is able to complete safety plan with writer’s engagement.

## 2021-11-09 NOTE — BH INPATIENT PSYCHIATRY DISCHARGE NOTE - NSBHSAALC_PSY_A_CORE FT
patient reports drinking 12 beers every other day since The patient’s case has been reviewed with the treatment team on day of discharge.    On initial mental status exam, (pull from 1st progress note)    Patient was started on……………..    On day of discharge patient’s mental status exam: (pull from last progress note)    The patient’s ______ symptoms have improved significantly. Patient was visible on the unit, socialized with peers and attended groups with fair participation. Patient did not require any PRN medications during his hospital course (except in the ed) for agitation.  Patient was remained in fair behavioral control on the unit. Patient denied any suicidal or homicidal ideations throughout hospitalization. Patient was provided with extensive psychoeducation regarding importance of compliance with medications.     On safety assessment on day of discharge, patient has the following risk factors which are nonmodifiable which include (pull from risk factors). Currently there are no modifiable risk factors that could further be addressed in the inpatient hospital setting as patient denies any depressive sxs, is not suicidal with no intent or plan, has improvement in sleep and energy, and improvement in manic sxs (such as impulsivity, irritability, intrusiveness, psychomotor agitation). Protective factors include (pull from protective factors).  He is a chronic moderate risk secondary to nonmodifiable risks but not an acute danger to self or others.        Pt was provided with pharmacotherapy and psychotherapy throughout this hospitalization and upon discharge was instructed to practice the provided safe coping mechanisms and to take prescribed medication only as directed. Patient is not judged to be an imminent danger to self or others at this time. Patient was provided with emergency phone numbers and were instructed to call 911 or go to the nearest ER for worsening of symptoms, dangerousness to self/others.     Patient denies any suicidal or homicidal ideation, intent and plan at the time of discharge. Patient is able to care for self. There is no evidence of florid psychosis or sania or exam. Patient does not pose imminent danger to self or others, stable for discharge home. Prognosis is guarded. Patient will be discharged today to home and outpatient follow-up at [facility] outpatient department on 7/9 with Dr. Dunn to address further psychotic sxs.     Medications upon discharge: pull from discharge medication reconciliation).    Patient will need a follow up from rehab.  reports history of blackouts, but denies withdrawal seizures or dts

## 2021-11-09 NOTE — BH DISCHARGE NOTE NURSING/SOCIAL WORK/PSYCH REHAB - PATIENT PORTAL LINK FT
You can access the FollowMyHealth Patient Portal offered by [facility] by registering at the following website: [email or web address]. By joining [facility]’s FollowMyHealth portal, you will also be able to view your health information using other applications (apps) compatible with our system.

## 2021-11-09 NOTE — BH DISCHARGE NOTE NURSING/SOCIAL WORK/PSYCH REHAB - NSDCPRRECOMMEND_PSY_ALL_CORE
Rehabilitation staff recommends patient continue working on medication compliance and coping skills, attend outpatient treatment and verbalize feelings/concerns.
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
## 2021-11-09 NOTE — BH INPATIENT PSYCHIATRY PROGRESS NOTE - MSE UNSTRUCTURED FT
Patient is calm, cooperative, good EC. No PMR/PMA, no abnormal movements. Speech is normal rate, volume, and tone. Mood "depressed" and affect is blunted, depressed to irritable, mood congruent and appropriate to content. TP linear and logical. +SI, denies HI/AVH/PI. Cognition grossly intact. I&J poor
Patient is calm, cooperative, fair EC. Oddly related.  No PMR/PMA, no abnormal movements. Speech is normal rate, volume, and tone.  Mood "better" and affect is blunted, neutral. TP linear and logical. Denies SI, denies HI/AVH/PI. Cognition grossly intact. I&J fair.
Patient is calm, cooperative, fair EC. Oddly related.  No PMR/PMA, no abnormal movements. Speech is normal rate, volume, and tone. Paucity of speech.  Mood "better" and affect is blunted, depressed to neutral. TP linear and logical. Denies SI, denies HI/AVH/PI. Cognition grossly intact. I&J fair.
Patient is calm, cooperative, fair EC. Oddly related.  No PMR/PMA, no abnormal movements. Speech is normal rate, volume, and tone. Paucity of speech.  Mood "better" and affect is blunted, depressed to neutral. TP linear and logical. Denies SI, denies HI/AVH/PI. Cognition grossly intact. I&J fair.
Patient is calm, cooperative, fair EC. Oddly related.  No PMR/PMA, no abnormal movements. Speech is normal rate, volume, and tone.  Mood "better" and affect is irritable to euthymic in range, appropriate to content. TP linear and logical. Denies SI, denies HI/AVH/PI. Cognition grossly intact. I&J fair.
Patient is calm, cooperative, fair EC. Oddly related.  No PMR/PMA, no abnormal movements. Speech is normal rate, volume, and tone. Paucity of speech.  Mood "better" and affect is blunted, depressed to neutral. TP linear and logical. Denies SI, denies HI/AVH/PI. Cognition grossly intact. I&J fair.
Patient is calm, cooperative, fair EC. Oddly related.  No PMR/PMA, no abnormal movements. Speech is normal rate, volume, and tone. Mood "depressed" and affect is blunted, depressed, mood congruent and appropriate to content. TP linear and logical. +SI, denies HI/AVH/PI. Cognition grossly intact. I&J poor
Patient is calm, cooperative, good EC. No PMR/PMA, no abnormal movements. Speech is normal rate, volume, and tone. Mood "depressed" and affect is blunted, depressed, mood congruent and appropriate to content. TP linear and logical. +SI, denies HI/AVH/PI. Cognition grossly intact. I&J poor
Agitated, irritable, provocative, threatening, violent.  +SI with plan and intent to shoot himself in the head.

## 2021-11-09 NOTE — BH DISCHARGE NOTE NURSING/SOCIAL WORK/PSYCH REHAB - NSBHDCADDR1FT_A_CORE
[address] [place] [address]. Go to [facility]. Entrance is on [address] [place] [address]. Go to [facility]. Entrance is on [address]  Appointment is in person.

## 2021-11-09 NOTE — BH INPATIENT PSYCHIATRY PROGRESS NOTE - NSTXSUICIDGOAL_PSY_ALL_CORE
Will identify and utilize 2 coping skills
Be able to state 3 reasons for living
Will identify and utilize 2 coping skills
Talk to staff and ask for assistance when having suicidal wishes instead of acting out
Be able to state 3 reasons for living
Be able to state 3 reasons for living
Will identify and utilize 2 coping skills
Be able to state 3 reasons for living
Will identify and utilize 2 coping skills

## 2021-11-09 NOTE — BH INPATIENT PSYCHIATRY PROGRESS NOTE - CURRENT MEDICATION
MEDICATIONS  (STANDING):  folic acid 1 milliGRAM(s) Oral daily  multivitamin 1 Tablet(s) Oral daily  naltrexone 50 milliGRAM(s) Oral daily  pantoprazole    Tablet 40 milliGRAM(s) Oral before breakfast  risperiDONE   Tablet 1 milliGRAM(s) Oral at bedtime  sertraline 100 milliGRAM(s) Oral daily  thiamine 100 milliGRAM(s) Oral daily  topiramate 50 milliGRAM(s) Oral daily  traZODone 100 milliGRAM(s) Oral at bedtime    MEDICATIONS  (PRN):  hydrOXYzine hydrochloride 50 milliGRAM(s) Oral every 6 hours PRN anxiety  LORazepam     Tablet 2 milliGRAM(s) Oral every 2 hours PRN CIWA score increase by 2 points and current CIWA score GREATER THAN 9  LORazepam   Injectable 2 milliGRAM(s) IntraMuscular every 2 hours PRN CIWA score increase by 2 points and current CIWA score GREATER THAN 9  
MEDICATIONS  (STANDING):  folic acid 1 milliGRAM(s) Oral daily  multivitamin 1 Tablet(s) Oral daily  naltrexone 50 milliGRAM(s) Oral daily  nicotine - 21 mG/24Hr(s) Patch 1 patch Transdermal daily  pantoprazole    Tablet 40 milliGRAM(s) Oral before breakfast  risperiDONE   Tablet 2 milliGRAM(s) Oral at bedtime  sertraline 150 milliGRAM(s) Oral daily  thiamine 100 milliGRAM(s) Oral daily  topiramate 50 milliGRAM(s) Oral daily  traZODone 100 milliGRAM(s) Oral at bedtime    MEDICATIONS  (PRN):  acetaminophen     Tablet .. 650 milliGRAM(s) Oral every 6 hours PRN Temp greater or equal to 38C (100.4F), Mild Pain (1 - 3), Moderate Pain (4 - 6)  diphenhydrAMINE 50 milliGRAM(s) Oral every 4 hours PRN agitation  diphenhydrAMINE Injectable 25 milliGRAM(s) IntraMuscular once PRN severe agitation  haloperidol     Tablet 5 milliGRAM(s) Oral every 4 hours PRN agitation  haloperidol    Injectable 7.5 milliGRAM(s) IntraMuscular once PRN severe agitation  hydrOXYzine hydrochloride 50 milliGRAM(s) Oral every 6 hours PRN anxiety  LORazepam     Tablet 2 milliGRAM(s) Oral every 2 hours PRN CIWA score increase by 2 points and current CIWA score GREATER THAN 9  LORazepam     Tablet 2 milliGRAM(s) Oral every 4 hours PRN agitation  LORazepam   Injectable 3 milliGRAM(s) IntraMuscular once PRN severe agitation  LORazepam   Injectable 2 milliGRAM(s) IntraMuscular every 2 hours PRN CIWA score increase by 2 points and current CIWA score GREATER THAN 9  nicotine  Polacrilex Gum 4 milliGRAM(s) Oral every 2 hours PRN smoking cessation  
MEDICATIONS  (STANDING):  folic acid 1 milliGRAM(s) Oral daily  lithium 600 milliGRAM(s) Oral at bedtime  multivitamin 1 Tablet(s) Oral daily  naltrexone 50 milliGRAM(s) Oral daily  pantoprazole    Tablet 40 milliGRAM(s) Oral before breakfast  risperiDONE   Tablet 3 milliGRAM(s) Oral at bedtime  risperiDONE   Tablet 1 milliGRAM(s) Oral <User Schedule>  sertraline 150 milliGRAM(s) Oral daily  topiramate 50 milliGRAM(s) Oral daily  traZODone 100 milliGRAM(s) Oral at bedtime    MEDICATIONS  (PRN):  acetaminophen     Tablet .. 650 milliGRAM(s) Oral every 6 hours PRN Temp greater or equal to 38C (100.4F), Mild Pain (1 - 3), Moderate Pain (4 - 6)  diphenhydrAMINE 50 milliGRAM(s) Oral every 4 hours PRN agitation  diphenhydrAMINE Injectable 25 milliGRAM(s) IntraMuscular once PRN severe agitation  haloperidol     Tablet 5 milliGRAM(s) Oral every 4 hours PRN agitation  haloperidol    Injectable 7.5 milliGRAM(s) IntraMuscular once PRN severe agitation  hydrOXYzine hydrochloride 50 milliGRAM(s) Oral every 6 hours PRN anxiety  LORazepam     Tablet 2 milliGRAM(s) Oral every 4 hours PRN agitation  LORazepam   Injectable 3 milliGRAM(s) IntraMuscular once PRN severe agitation  nicotine  Polacrilex Gum 4 milliGRAM(s) Oral every 2 hours PRN smoking cessation  nicotine -  14 mG/24Hr(s) Patch 1 patch Transdermal daily PRN nicotine dependence  
MEDICATIONS  (STANDING):  folic acid 1 milliGRAM(s) Oral daily  lithium 600 milliGRAM(s) Oral at bedtime  multivitamin 1 Tablet(s) Oral daily  naltrexone 50 milliGRAM(s) Oral daily  pantoprazole    Tablet 40 milliGRAM(s) Oral before breakfast  risperiDONE   Tablet 2 milliGRAM(s) Oral at bedtime  sertraline 150 milliGRAM(s) Oral daily  topiramate 50 milliGRAM(s) Oral daily  traZODone 100 milliGRAM(s) Oral at bedtime    MEDICATIONS  (PRN):  acetaminophen     Tablet .. 650 milliGRAM(s) Oral every 6 hours PRN Temp greater or equal to 38C (100.4F), Mild Pain (1 - 3), Moderate Pain (4 - 6)  diphenhydrAMINE 50 milliGRAM(s) Oral every 4 hours PRN agitation  diphenhydrAMINE Injectable 25 milliGRAM(s) IntraMuscular once PRN severe agitation  haloperidol     Tablet 5 milliGRAM(s) Oral every 4 hours PRN agitation  haloperidol    Injectable 7.5 milliGRAM(s) IntraMuscular once PRN severe agitation  hydrOXYzine hydrochloride 50 milliGRAM(s) Oral every 6 hours PRN anxiety  LORazepam     Tablet 2 milliGRAM(s) Oral every 4 hours PRN agitation  LORazepam     Tablet 2 milliGRAM(s) Oral every 2 hours PRN CIWA score increase by 2 points and current CIWA score GREATER THAN 9  LORazepam   Injectable 2 milliGRAM(s) IntraMuscular every 2 hours PRN CIWA score increase by 2 points and current CIWA score GREATER THAN 9  LORazepam   Injectable 3 milliGRAM(s) IntraMuscular once PRN severe agitation  nicotine  Polacrilex Gum 4 milliGRAM(s) Oral every 2 hours PRN smoking cessation  nicotine -  14 mG/24Hr(s) Patch 1 patch Transdermal daily PRN nicotine dependence  
MEDICATIONS  (STANDING):  folic acid 1 milliGRAM(s) Oral daily  multivitamin 1 Tablet(s) Oral daily  naltrexone 50 milliGRAM(s) Oral daily  nicotine - 21 mG/24Hr(s) Patch 1 patch Transdermal daily  pantoprazole    Tablet 40 milliGRAM(s) Oral before breakfast  risperiDONE   Tablet 2 milliGRAM(s) Oral at bedtime  sertraline 150 milliGRAM(s) Oral daily  topiramate 50 milliGRAM(s) Oral daily  traZODone 100 milliGRAM(s) Oral at bedtime    MEDICATIONS  (PRN):  acetaminophen     Tablet .. 650 milliGRAM(s) Oral every 6 hours PRN Temp greater or equal to 38C (100.4F), Mild Pain (1 - 3), Moderate Pain (4 - 6)  diphenhydrAMINE 50 milliGRAM(s) Oral every 4 hours PRN agitation  diphenhydrAMINE Injectable 25 milliGRAM(s) IntraMuscular once PRN severe agitation  haloperidol     Tablet 5 milliGRAM(s) Oral every 4 hours PRN agitation  haloperidol    Injectable 7.5 milliGRAM(s) IntraMuscular once PRN severe agitation  hydrOXYzine hydrochloride 50 milliGRAM(s) Oral every 6 hours PRN anxiety  LORazepam     Tablet 2 milliGRAM(s) Oral every 2 hours PRN CIWA score increase by 2 points and current CIWA score GREATER THAN 9  LORazepam     Tablet 2 milliGRAM(s) Oral every 4 hours PRN agitation  LORazepam   Injectable 3 milliGRAM(s) IntraMuscular once PRN severe agitation  LORazepam   Injectable 2 milliGRAM(s) IntraMuscular every 2 hours PRN CIWA score increase by 2 points and current CIWA score GREATER THAN 9  nicotine  Polacrilex Gum 4 milliGRAM(s) Oral every 2 hours PRN smoking cessation  
MEDICATIONS  (STANDING):  benztropine 0.5 milliGRAM(s) Oral two times a day  lithium 600 milliGRAM(s) Oral at bedtime  naltrexone 50 milliGRAM(s) Oral daily  nicotine -   7 mG/24Hr(s) Patch 1 patch Transdermal daily  pantoprazole    Tablet 40 milliGRAM(s) Oral before breakfast  psyllium Powder 1 Packet(s) Oral daily  risperiDONE   Tablet 2 milliGRAM(s) Oral two times a day  sertraline 150 milliGRAM(s) Oral daily  topiramate 50 milliGRAM(s) Oral daily  traZODone 100 milliGRAM(s) Oral at bedtime    MEDICATIONS  (PRN):  acetaminophen     Tablet .. 650 milliGRAM(s) Oral every 6 hours PRN Temp greater or equal to 38C (100.4F), Mild Pain (1 - 3), Moderate Pain (4 - 6)  diphenhydrAMINE 50 milliGRAM(s) Oral every 4 hours PRN agitation  diphenhydrAMINE Injectable 25 milliGRAM(s) IntraMuscular once PRN severe agitation  haloperidol     Tablet 5 milliGRAM(s) Oral every 4 hours PRN agitation  haloperidol    Injectable 7.5 milliGRAM(s) IntraMuscular once PRN severe agitation  hydrOXYzine hydrochloride 50 milliGRAM(s) Oral every 6 hours PRN anxiety  LORazepam     Tablet 2 milliGRAM(s) Oral every 8 hours PRN agitation  nicotine  Polacrilex Gum 4 milliGRAM(s) Oral every 2 hours PRN smoking cessation  OLANZapine 2.5 milliGRAM(s) Oral every 4 hours PRN anxiety  
MEDICATIONS  (STANDING):  benztropine 0.5 milliGRAM(s) Oral two times a day  lithium 600 milliGRAM(s) Oral at bedtime  naltrexone 50 milliGRAM(s) Oral daily  nicotine -   7 mG/24Hr(s) Patch 1 patch Transdermal daily  pantoprazole    Tablet 40 milliGRAM(s) Oral before breakfast  psyllium Powder 1 Packet(s) Oral daily  risperiDONE   Tablet 2 milliGRAM(s) Oral two times a day  sertraline 150 milliGRAM(s) Oral daily  topiramate 50 milliGRAM(s) Oral daily  traZODone 100 milliGRAM(s) Oral at bedtime    MEDICATIONS  (PRN):  acetaminophen     Tablet .. 650 milliGRAM(s) Oral every 6 hours PRN Temp greater or equal to 38C (100.4F), Mild Pain (1 - 3), Moderate Pain (4 - 6)  diphenhydrAMINE 50 milliGRAM(s) Oral every 4 hours PRN agitation  diphenhydrAMINE Injectable 25 milliGRAM(s) IntraMuscular once PRN severe agitation  haloperidol     Tablet 5 milliGRAM(s) Oral every 4 hours PRN agitation  haloperidol    Injectable 7.5 milliGRAM(s) IntraMuscular once PRN severe agitation  hydrOXYzine hydrochloride 50 milliGRAM(s) Oral every 6 hours PRN anxiety  LORazepam     Tablet 2 milliGRAM(s) Oral every 8 hours PRN agitation  LORazepam   Injectable 3 milliGRAM(s) IntraMuscular once PRN severe agitation  nicotine  Polacrilex Gum 4 milliGRAM(s) Oral every 2 hours PRN smoking cessation  OLANZapine 2.5 milliGRAM(s) Oral every 4 hours PRN anxiety  
MEDICATIONS  (STANDING):  benztropine 0.5 milliGRAM(s) Oral two times a day  folic acid 1 milliGRAM(s) Oral daily  lithium 600 milliGRAM(s) Oral at bedtime  multivitamin 1 Tablet(s) Oral daily  naltrexone 50 milliGRAM(s) Oral daily  nicotine -   7 mG/24Hr(s) Patch 1 patch Transdermal daily  pantoprazole    Tablet 40 milliGRAM(s) Oral before breakfast  risperiDONE   Tablet 2 milliGRAM(s) Oral at bedtime  risperiDONE   Tablet 1 milliGRAM(s) Oral <User Schedule>  sertraline 150 milliGRAM(s) Oral daily  topiramate 50 milliGRAM(s) Oral daily  traZODone 100 milliGRAM(s) Oral at bedtime    MEDICATIONS  (PRN):  acetaminophen     Tablet .. 650 milliGRAM(s) Oral every 6 hours PRN Temp greater or equal to 38C (100.4F), Mild Pain (1 - 3), Moderate Pain (4 - 6)  diphenhydrAMINE 50 milliGRAM(s) Oral every 4 hours PRN agitation  diphenhydrAMINE Injectable 25 milliGRAM(s) IntraMuscular once PRN severe agitation  haloperidol     Tablet 5 milliGRAM(s) Oral every 4 hours PRN agitation  haloperidol    Injectable 7.5 milliGRAM(s) IntraMuscular once PRN severe agitation  hydrOXYzine hydrochloride 50 milliGRAM(s) Oral every 6 hours PRN anxiety  LORazepam     Tablet 2 milliGRAM(s) Oral every 4 hours PRN agitation  LORazepam   Injectable 3 milliGRAM(s) IntraMuscular once PRN severe agitation  nicotine  Polacrilex Gum 4 milliGRAM(s) Oral every 2 hours PRN smoking cessation  
MEDICATIONS  (STANDING):  benztropine 0.5 milliGRAM(s) Oral two times a day  lithium 600 milliGRAM(s) Oral at bedtime  naltrexone 50 milliGRAM(s) Oral daily  nicotine -   7 mG/24Hr(s) Patch 1 patch Transdermal daily  pantoprazole    Tablet 40 milliGRAM(s) Oral before breakfast  psyllium Powder 1 Packet(s) Oral daily  risperiDONE   Tablet 2 milliGRAM(s) Oral two times a day  sertraline 150 milliGRAM(s) Oral daily  topiramate 50 milliGRAM(s) Oral daily  traZODone 100 milliGRAM(s) Oral at bedtime    MEDICATIONS  (PRN):  acetaminophen     Tablet .. 650 milliGRAM(s) Oral every 6 hours PRN Temp greater or equal to 38C (100.4F), Mild Pain (1 - 3), Moderate Pain (4 - 6)  diphenhydrAMINE 50 milliGRAM(s) Oral every 4 hours PRN agitation  diphenhydrAMINE Injectable 25 milliGRAM(s) IntraMuscular once PRN severe agitation  haloperidol     Tablet 5 milliGRAM(s) Oral every 4 hours PRN agitation  haloperidol    Injectable 7.5 milliGRAM(s) IntraMuscular once PRN severe agitation  hydrOXYzine hydrochloride 50 milliGRAM(s) Oral every 6 hours PRN anxiety  LORazepam     Tablet 2 milliGRAM(s) Oral every 8 hours PRN agitation  nicotine  Polacrilex Gum 4 milliGRAM(s) Oral every 2 hours PRN smoking cessation  OLANZapine 2.5 milliGRAM(s) Oral every 4 hours PRN anxiety

## 2021-11-09 NOTE — BH INPATIENT PSYCHIATRY PROGRESS NOTE - NSTXSUICIDDATETRGT_PSY_ALL_CORE
11-Nov-2021
06-Nov-2021
09-Nov-2021
11-Nov-2021
11-Nov-2021
10-Nov-2021

## 2021-11-09 NOTE — BH INPATIENT PSYCHIATRY PROGRESS NOTE - NSTXSUICIDINTERMD_PSY_ALL_CORE
medication management and milieu therapy

## 2021-11-09 NOTE — BH INPATIENT PSYCHIATRY PROGRESS NOTE - NSBHASSESSSUMMFT_PSY_ALL_CORE
Patient admitted with SI to shoot himself.  Patient was severely agitated, needing IMs and seclusion first 2 days of admission.  Irritability has gradually decreased and insight is emerging.  Patient is unsure if agitation was motivated by delusions, paranoia, hallucinations, or other source.  Denies substance use contributing.  He is eating and sleeping well, engaged in treatment. Reports feeling much better in terms of mood and clarity of thought.  Denies active or passive SI.  Denies psychotic symptoms.  Denies specific paranoid ideation.  No side effects of Invega TURNER save pain at injection site. In view of significant improvement on this regimen, will discharge today.  Given [facility] bridge appt as patient says he is moving to [place] and will not follow up here, does not want to wait for other referral. Patient is not interested in inpatient substance treatment.  Presenting symptoms were most likely caused by substance use and have now remitted.      Suicide and risk assessment performed prior to discharge. The patient has a low acute risk and moderate chronic risk of self-harm and aggression towards others. Protective factors include denying SI, no SIB, denying HI, no current mood symptoms, no hopelessness, future-oriented in returning to home, no access to firearms.  Risk factors include presenting illness, substance use including recently, few social supports. Immediate risk was minimized by inpatient admission to a safe environment with appropriate supervision and limited access to lethal means. Future risk was minimized before discharge by treatment of acute episode, maximizing outpatient support, providing relevant patient education, discussing emergency procedures, and ensuring close follow-up. The patient remains at a low risk of self-harm, and such risk cannot be further ameliorated by continued inpatient treatment and the patient is therefore appropriate for discharge.       There were no behavioral problems on the unit.  Patient did not become agitated and did not require emergent intramuscular medications or seclusion / restraints.  Patient did not self-harm on the unit.  Patient remained actively engaged in treatment.  Patient participated in individual, group, and milieu therapy.  Patient got along appropriately with staff and peers.   Patient did not have any medical problems during this hospitalization.  There were no medical consultations.    A full discussion of the factors that predict treatment success and relapse was held including safety planning.  A discussion of the risks and benefits of patient’s medication was held including a discussion of the metabolic risks and risk of EPS and TD was done.    The patient has improved significantly and no longer requires inpatient treatment and care. Patient denies all suicidal and aggressive ideation, intent and plan. Patient denies anxiety symptoms and panic attacks. Patient is not judged to be an acute danger to self or others at this time. Patient will be discharged today to home and outpatient follow up.        Continue risperdal 4mg qHS and zoloft 150mg daily.  Invega Sustena 234mg given yesterday, next injection due 11/15.   Li 600mg qHS, tolerating it well, level low at 0.2 but is augmentation stategy for depression, low suspicion for sania and not needed for treatment of such.   Patient admitted with SI to shoot himself.  Patient was severely agitated, needing IMs and seclusion first 2 days of admission.  Irritability has gradually decreased and insight is emerging.  Patient is unsure if agitation was motivated by delusions, paranoia, hallucinations, or other source.  Denies substance use contributing.  He is eating and sleeping well, engaged in treatment. Reports feeling much better in terms of mood and clarity of thought.  Denies active or passive SI.  Denies psychotic symptoms.  Denies specific paranoid ideation.  No side effects of Invega TURNER save pain at injection site. In view of significant improvement on this regimen, will discharge today.  Given [facility] bridge appt as patient says he is moving to [place] and will not follow up here, does not want to wait for other referral. Patient is not interested in inpatient substance treatment.  Presenting symptoms were most likely caused by substance use and have now remitted.  Counseled to f/u with PCP about HLD.      Suicide and risk assessment performed prior to discharge. The patient has a low acute risk and moderate chronic risk of self-harm and aggression towards others. Protective factors include denying SI, no SIB, denying HI, no current mood symptoms, no hopelessness, future-oriented in returning to home, no access to firearms.  Risk factors include presenting illness, substance use including recently, few social supports. Immediate risk was minimized by inpatient admission to a safe environment with appropriate supervision and limited access to lethal means. Future risk was minimized before discharge by treatment of acute episode, maximizing outpatient support, providing relevant patient education, discussing emergency procedures, and ensuring close follow-up. The patient remains at a low risk of self-harm, and such risk cannot be further ameliorated by continued inpatient treatment and the patient is therefore appropriate for discharge.       There were no behavioral problems on the unit.  Patient did not become agitated and did not require emergent intramuscular medications or seclusion / restraints.  Patient did not self-harm on the unit.  Patient remained actively engaged in treatment.  Patient participated in individual, group, and milieu therapy.  Patient got along appropriately with staff and peers.   Patient did not have any medical problems during this hospitalization.  There were no medical consultations.    A full discussion of the factors that predict treatment success and relapse was held including safety planning.  A discussion of the risks and benefits of patient’s medication was held including a discussion of the metabolic risks and risk of EPS and TD was done.    The patient has improved significantly and no longer requires inpatient treatment and care. Patient denies all suicidal and aggressive ideation, intent and plan. Patient denies anxiety symptoms and panic attacks. Patient is not judged to be an acute danger to self or others at this time. Patient will be discharged today to home and outpatient follow up.        Continue risperdal 4mg qHS and zoloft 150mg daily.  Invega Sustena 234mg given yesterday, next injection due 11/15.   Li 600mg qHS, tolerating it well, level low at 0.2 but is augmentation stategy for depression, low suspicion for sania and not needed for treatment of such.

## 2021-11-09 NOTE — BH INPATIENT PSYCHIATRY DISCHARGE NOTE - NSDCMRMEDTOKEN_GEN_ALL_CORE_FT
benztropine 0.5 mg oral tablet: 1 tab(s) orally 2 times a day  folic acid 1 mg oral tablet: 1 tab(s) orally once a day  lithium 600 mg oral capsule: 1 cap(s) orally once a day (at bedtime)  Multiple Vitamins oral tablet: 1 tab(s) orally once a day  naltrexone 50 mg oral tablet: 1 tab(s) orally once a day  Protonix 40 mg oral delayed release tablet: 1 tab(s) orally once a day  psyllium 3.4 g/7 g oral powder for reconstitution: 1 packet(s) orally once a day, As Needed  RisperDAL 4 mg oral tablet: 1 tab(s) orally once a day (at bedtime)   Topamax 50 mg oral tablet: 1 tab(s) orally once a day  traZODone 100 mg oral tablet: 1 tab(s) orally once a day (at bedtime)  Zoloft 50 mg oral tablet: 3 tab(s) orally once a day

## 2021-11-09 NOTE — BH INPATIENT PSYCHIATRY PROGRESS NOTE - NSTXSUBMISGOAL_PSY_ALL_CORE
Be able to acknowledge that substance abuse is a problem

## 2021-11-09 NOTE — BH INPATIENT PSYCHIATRY PROGRESS NOTE - NSBHFUPINTERVALCCFT_PSY_A_CORE
depression, psychosis, agitation
depression
depression, psychosis, agitation

## 2021-11-09 NOTE — BH INPATIENT PSYCHIATRY PROGRESS NOTE - NSDCCRITERIA_PSY_ALL_CORE
when suicidal thoughts resolve and patient no longer a danger to self or others
When pt is no longer an acute or imminent risk of harm to self or others, and is able to care for self safely, pt may then be discharged. 
when suicidal thoughts resolve and patient no longer a danger to self or others
When pt is no longer an acute or imminent risk of harm to self or others, and is able to care for self safely, pt may then be discharged. 
when suicidal thoughts resolve and patient no longer a danger to self or others
when suicidal thoughts resolve and patient no longer a danger to self or others
When pt is no longer an acute or imminent risk of harm to self or others, and is able to care for self safely, pt may then be discharged.

## 2021-11-09 NOTE — BH INPATIENT PSYCHIATRY PROGRESS NOTE - NSBHMETABOLIC_PSY_ALL_CORE_FT
BMI: BMI (kg/m2): 36.2 (10-29-21 @ 17:45)  HbA1c:   Glucose:   BP: 104/63 (11-04-21 @ 08:41) (103/67 - 104/63)  Lipid Panel: 
BMI: BMI (kg/m2): 36.2 (10-29-21 @ 17:45)  HbA1c:   Glucose:   BP: 136/70 (10-30-21 @ 08:23) (136/70 - 136/70)  Lipid Panel: 
BMI: BMI (kg/m2): 36.2 (10-29-21 @ 17:45)  HbA1c:   Glucose:   BP: 123/73 (10-31-21 @ 06:34) (123/70 - 136/70)  Lipid Panel: 
BMI: BMI (kg/m2): 36.2 (10-29-21 @ 17:45)  HbA1c:   Glucose:   BP: 103/67 (11-03-21 @ 07:47) (103/67 - 126/80)  Lipid Panel: 
BMI: BMI (kg/m2): 36.2 (10-29-21 @ 17:45)  HbA1c:   Glucose:   BP: 125/85 (11-08-21 @ 08:06) (97/61 - 141/89)  Lipid Panel: 
BMI: BMI (kg/m2): 36.2 (10-29-21 @ 17:45)  HbA1c:   Glucose:   BP: 110/60 (11-01-21 @ 07:15) (110/60 - 136/70)  Lipid Panel: 
BMI: BMI (kg/m2): 36.2 (10-29-21 @ 17:45)  HbA1c:   Glucose:   BP: 104/63 (11-04-21 @ 08:41) (103/67 - 104/63)  Lipid Panel: 
BMI: BMI (kg/m2): 36.2 (10-29-21 @ 17:45)  HbA1c:   Glucose:   BP: 110/60 (11-01-21 @ 07:15) (110/60 - 126/80)  Lipid Panel: 
BMI: BMI (kg/m2): 36.2 (10-29-21 @ 17:45)  HbA1c: A1C with Estimated Average Glucose Result: 5.0 % (11-09-21 @ 11:08)    Glucose:   BP: 124/79 (11-09-21 @ 06:28) (111/62 - 141/89)  Lipid Panel: Date/Time: 11-09-21 @ 11:08  Cholesterol, Serum: 208  Direct LDL: --  HDL Cholesterol, Serum: 42  Total Cholesterol/HDL Ration Measurement: --  Triglycerides, Serum: 579

## 2021-11-09 NOTE — BH INPATIENT PSYCHIATRY PROGRESS NOTE - NSTXVIOLNTGOAL_PSY_ALL_CORE
Will demonstrate ability to tolerate peer conflicts without aggression 3x weekly
Will identify 2 situations that cause an aggressive response
Will identify 2 situations that cause an aggressive response

## 2021-11-09 NOTE — BH INPATIENT PSYCHIATRY PROGRESS NOTE - NSTXSUBMISINTERMD_PSY_ALL_CORE
continue naltrexaone 50mg daily, topomax for cravings
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
## 2021-11-09 NOTE — BH DISCHARGE NOTE NURSING/SOCIAL WORK/PSYCH REHAB - NSCDUDCCRISIS_PSY_A_CORE
[facility]  1 [phone]) [facility] ([phone])  Text "WELL" to 88473  Website: www.wmbly/.[facility] 1 (306) 191-FQSK (2561) Website: www.safehorizon.org/.National Suicide Prevention Lifeline 6 (530) 229-3074/.  [facility]  1 (447) LIFENET (873-4788)/.  [facility]  [phone] [place] [id number] [phone]   Hours:  Monday through Friday from 9 AM to 3 PM/.  [employer]  [employer] [facility] Crisis Line  [phone], Option 1

## 2021-11-22 NOTE — SOCIAL WORK POST DISCHARGE FOLLOW UP NOTE - NSBHSWFOLLOWUP_PSY_ALL_CORE_FT
SW was unable to reach patient. Pt was not considered to be a danger to himself or others by the treatment team at the time of discharge. Case closed.

## 2021-11-24 ENCOUNTER — EMERGENCY (EMERGENCY)
Facility: HOSPITAL | Age: 46
LOS: 1 days | Discharge: OTHER INSTITUTION | End: 2021-11-24
Attending: EMERGENCY MEDICINE
Payer: MEDICARE

## 2021-11-24 ENCOUNTER — INPATIENT (INPATIENT)
Facility: HOSPITAL | Age: 46
LOS: 8 days | Discharge: HOME | End: 2021-12-03
Attending: PSYCHIATRY & NEUROLOGY | Admitting: PSYCHIATRY & NEUROLOGY
Payer: MEDICARE

## 2021-11-24 VITALS
TEMPERATURE: 98 F | OXYGEN SATURATION: 98 % | RESPIRATION RATE: 14 BRPM | HEIGHT: 78 IN | HEART RATE: 88 BPM | SYSTOLIC BLOOD PRESSURE: 127 MMHG | DIASTOLIC BLOOD PRESSURE: 73 MMHG | WEIGHT: 214.95 LBS

## 2021-11-24 VITALS
SYSTOLIC BLOOD PRESSURE: 109 MMHG | TEMPERATURE: 98 F | RESPIRATION RATE: 18 BRPM | HEART RATE: 76 BPM | DIASTOLIC BLOOD PRESSURE: 72 MMHG | OXYGEN SATURATION: 95 %

## 2021-11-24 VITALS — TEMPERATURE: 98 F | WEIGHT: 255.74 LBS | HEIGHT: 73 IN | RESPIRATION RATE: 16 BRPM

## 2021-11-24 DIAGNOSIS — F19.94 OTHER PSYCHOACTIVE SUBSTANCE USE, UNSPECIFIED WITH PSYCHOACTIVE SUBSTANCE-INDUCED MOOD DISORDER: ICD-10-CM

## 2021-11-24 DIAGNOSIS — F33.2 MAJOR DEPRESSIVE DISORDER, RECURRENT SEVERE WITHOUT PSYCHOTIC FEATURES: ICD-10-CM

## 2021-11-24 DIAGNOSIS — F43.23 ADJUSTMENT DISORDER WITH MIXED ANXIETY AND DEPRESSED MOOD: ICD-10-CM

## 2021-11-24 DIAGNOSIS — F10.10 ALCOHOL ABUSE, UNCOMPLICATED: ICD-10-CM

## 2021-11-24 DIAGNOSIS — F14.10 COCAINE ABUSE, UNCOMPLICATED: ICD-10-CM

## 2021-11-24 PROBLEM — Z86.59 PERSONAL HISTORY OF OTHER MENTAL AND BEHAVIORAL DISORDERS: Chronic | Status: ACTIVE | Noted: 2021-10-29

## 2021-11-24 PROBLEM — K21.9 GASTRO-ESOPHAGEAL REFLUX DISEASE WITHOUT ESOPHAGITIS: Chronic | Status: ACTIVE | Noted: 2021-10-29

## 2021-11-24 PROBLEM — F41.9 ANXIETY DISORDER, UNSPECIFIED: Chronic | Status: ACTIVE | Noted: 2021-10-29

## 2021-11-24 LAB
AMPHET UR-MCNC: NEGATIVE — SIGNIFICANT CHANGE UP
ANION GAP SERPL CALC-SCNC: 16 MMOL/L — SIGNIFICANT CHANGE UP (ref 5–17)
APAP SERPL-MCNC: <3 UG/ML — LOW (ref 10–26)
APPEARANCE UR: CLEAR — SIGNIFICANT CHANGE UP
BACTERIA # UR AUTO: ABNORMAL
BARBITURATES UR SCN-MCNC: NEGATIVE — SIGNIFICANT CHANGE UP
BASOPHILS # BLD AUTO: 0.06 K/UL — SIGNIFICANT CHANGE UP (ref 0–0.2)
BASOPHILS NFR BLD AUTO: 0.6 % — SIGNIFICANT CHANGE UP (ref 0–2)
BENZODIAZ UR-MCNC: NEGATIVE — SIGNIFICANT CHANGE UP
BILIRUB UR-MCNC: NEGATIVE — SIGNIFICANT CHANGE UP
BUN SERPL-MCNC: 16 MG/DL — SIGNIFICANT CHANGE UP (ref 8–20)
CALCIUM SERPL-MCNC: 9.5 MG/DL — SIGNIFICANT CHANGE UP (ref 8.6–10.2)
CHLORIDE SERPL-SCNC: 104 MMOL/L — SIGNIFICANT CHANGE UP (ref 98–107)
CO2 SERPL-SCNC: 22 MMOL/L — SIGNIFICANT CHANGE UP (ref 22–29)
COCAINE METAB.OTHER UR-MCNC: POSITIVE
COLOR SPEC: YELLOW — SIGNIFICANT CHANGE UP
CREAT SERPL-MCNC: 0.82 MG/DL — SIGNIFICANT CHANGE UP (ref 0.5–1.3)
DIFF PNL FLD: NEGATIVE — SIGNIFICANT CHANGE UP
EOSINOPHIL # BLD AUTO: 0.14 K/UL — SIGNIFICANT CHANGE UP (ref 0–0.5)
EOSINOPHIL NFR BLD AUTO: 1.4 % — SIGNIFICANT CHANGE UP (ref 0–6)
EPI CELLS # UR: SIGNIFICANT CHANGE UP
ETHANOL SERPL-MCNC: <10 MG/DL — SIGNIFICANT CHANGE UP (ref 0–9)
GLUCOSE SERPL-MCNC: 95 MG/DL — SIGNIFICANT CHANGE UP (ref 70–99)
GLUCOSE UR QL: NEGATIVE MG/DL — SIGNIFICANT CHANGE UP
HCT VFR BLD CALC: 40.6 % — SIGNIFICANT CHANGE UP (ref 39–50)
HGB BLD-MCNC: 14.1 G/DL — SIGNIFICANT CHANGE UP (ref 13–17)
IMM GRANULOCYTES NFR BLD AUTO: 0.8 % — SIGNIFICANT CHANGE UP (ref 0–1.5)
KETONES UR-MCNC: NEGATIVE — SIGNIFICANT CHANGE UP
LEUKOCYTE ESTERASE UR-ACNC: NEGATIVE — SIGNIFICANT CHANGE UP
LYMPHOCYTES # BLD AUTO: 2.25 K/UL — SIGNIFICANT CHANGE UP (ref 1–3.3)
LYMPHOCYTES # BLD AUTO: 23.1 % — SIGNIFICANT CHANGE UP (ref 13–44)
MCHC RBC-ENTMCNC: 29.6 PG — SIGNIFICANT CHANGE UP (ref 27–34)
MCHC RBC-ENTMCNC: 34.7 GM/DL — SIGNIFICANT CHANGE UP (ref 32–36)
MCV RBC AUTO: 85.3 FL — SIGNIFICANT CHANGE UP (ref 80–100)
METHADONE UR-MCNC: NEGATIVE — SIGNIFICANT CHANGE UP
MONOCYTES # BLD AUTO: 0.73 K/UL — SIGNIFICANT CHANGE UP (ref 0–0.9)
MONOCYTES NFR BLD AUTO: 7.5 % — SIGNIFICANT CHANGE UP (ref 2–14)
NEUTROPHILS # BLD AUTO: 6.47 K/UL — SIGNIFICANT CHANGE UP (ref 1.8–7.4)
NEUTROPHILS NFR BLD AUTO: 66.6 % — SIGNIFICANT CHANGE UP (ref 43–77)
NITRITE UR-MCNC: NEGATIVE — SIGNIFICANT CHANGE UP
OPIATES UR-MCNC: NEGATIVE — SIGNIFICANT CHANGE UP
PCP SPEC-MCNC: SIGNIFICANT CHANGE UP
PCP UR-MCNC: NEGATIVE — SIGNIFICANT CHANGE UP
PH UR: 7 — SIGNIFICANT CHANGE UP (ref 5–8)
PLATELET # BLD AUTO: 242 K/UL — SIGNIFICANT CHANGE UP (ref 150–400)
POTASSIUM SERPL-MCNC: 4.4 MMOL/L — SIGNIFICANT CHANGE UP (ref 3.5–5.3)
POTASSIUM SERPL-SCNC: 4.4 MMOL/L — SIGNIFICANT CHANGE UP (ref 3.5–5.3)
PROT UR-MCNC: 30 MG/DL
RBC # BLD: 4.76 M/UL — SIGNIFICANT CHANGE UP (ref 4.2–5.8)
RBC # FLD: 12.6 % — SIGNIFICANT CHANGE UP (ref 10.3–14.5)
RBC CASTS # UR COMP ASSIST: SIGNIFICANT CHANGE UP /HPF (ref 0–4)
SALICYLATES SERPL-MCNC: <0.6 MG/DL — LOW (ref 10–20)
SARS-COV-2 RNA SPEC QL NAA+PROBE: SIGNIFICANT CHANGE UP
SODIUM SERPL-SCNC: 142 MMOL/L — SIGNIFICANT CHANGE UP (ref 135–145)
SP GR SPEC: 1.01 — SIGNIFICANT CHANGE UP (ref 1.01–1.02)
THC UR QL: NEGATIVE — SIGNIFICANT CHANGE UP
UROBILINOGEN FLD QL: NEGATIVE MG/DL — SIGNIFICANT CHANGE UP
WBC # BLD: 9.73 K/UL — SIGNIFICANT CHANGE UP (ref 3.8–10.5)
WBC # FLD AUTO: 9.73 K/UL — SIGNIFICANT CHANGE UP (ref 3.8–10.5)
WBC UR QL: SIGNIFICANT CHANGE UP

## 2021-11-24 PROCEDURE — U0003: CPT

## 2021-11-24 PROCEDURE — 99285 EMERGENCY DEPT VISIT HI MDM: CPT

## 2021-11-24 PROCEDURE — 80048 BASIC METABOLIC PNL TOTAL CA: CPT

## 2021-11-24 PROCEDURE — 93010 ELECTROCARDIOGRAM REPORT: CPT

## 2021-11-24 PROCEDURE — 90792 PSYCH DIAG EVAL W/MED SRVCS: CPT

## 2021-11-24 PROCEDURE — U0005: CPT

## 2021-11-24 PROCEDURE — 85025 COMPLETE CBC W/AUTO DIFF WBC: CPT

## 2021-11-24 PROCEDURE — 80307 DRUG TEST PRSMV CHEM ANLYZR: CPT

## 2021-11-24 PROCEDURE — 81001 URINALYSIS AUTO W/SCOPE: CPT

## 2021-11-24 PROCEDURE — 36415 COLL VENOUS BLD VENIPUNCTURE: CPT

## 2021-11-24 PROCEDURE — 93005 ELECTROCARDIOGRAM TRACING: CPT

## 2021-11-24 RX ORDER — NICOTINE POLACRILEX 2 MG
4 GUM BUCCAL
Refills: 0 | Status: DISCONTINUED | OUTPATIENT
Start: 2021-11-24 | End: 2021-12-03

## 2021-11-24 RX ORDER — RISPERIDONE 4 MG/1
4 TABLET ORAL AT BEDTIME
Refills: 0 | Status: DISCONTINUED | OUTPATIENT
Start: 2021-11-24 | End: 2021-11-28

## 2021-11-24 RX ORDER — HALOPERIDOL DECANOATE 100 MG/ML
5 INJECTION INTRAMUSCULAR EVERY 6 HOURS
Refills: 0 | Status: DISCONTINUED | OUTPATIENT
Start: 2021-11-24 | End: 2021-12-01

## 2021-11-24 RX ORDER — DIPHENHYDRAMINE HCL 50 MG
25 CAPSULE ORAL ONCE
Refills: 0 | Status: DISCONTINUED | OUTPATIENT
Start: 2021-11-24 | End: 2021-12-03

## 2021-11-24 RX ORDER — ACETAMINOPHEN 500 MG
650 TABLET ORAL EVERY 6 HOURS
Refills: 0 | Status: DISCONTINUED | OUTPATIENT
Start: 2021-11-24 | End: 2021-12-03

## 2021-11-24 RX ORDER — RISPERIDONE 4 MG/1
4 TABLET ORAL DAILY
Refills: 0 | Status: DISCONTINUED | OUTPATIENT
Start: 2021-11-24 | End: 2021-11-27

## 2021-11-24 RX ORDER — DIPHENHYDRAMINE HCL 50 MG
50 CAPSULE ORAL EVERY 4 HOURS
Refills: 0 | Status: DISCONTINUED | OUTPATIENT
Start: 2021-11-24 | End: 2021-12-03

## 2021-11-24 RX ORDER — SERTRALINE 25 MG/1
50 TABLET, FILM COATED ORAL DAILY
Refills: 0 | Status: DISCONTINUED | OUTPATIENT
Start: 2021-11-24 | End: 2021-11-26

## 2021-11-24 RX ORDER — SERTRALINE 25 MG/1
50 TABLET, FILM COATED ORAL DAILY
Refills: 0 | Status: DISCONTINUED | OUTPATIENT
Start: 2021-11-24 | End: 2021-11-28

## 2021-11-24 RX ORDER — HYDROXYZINE HCL 10 MG
50 TABLET ORAL EVERY 6 HOURS
Refills: 0 | Status: DISCONTINUED | OUTPATIENT
Start: 2021-11-24 | End: 2021-11-29

## 2021-11-24 RX ORDER — TRAZODONE HCL 50 MG
50 TABLET ORAL AT BEDTIME
Refills: 0 | Status: DISCONTINUED | OUTPATIENT
Start: 2021-11-24 | End: 2021-11-28

## 2021-11-24 RX ORDER — HALOPERIDOL DECANOATE 100 MG/ML
5 INJECTION INTRAMUSCULAR ONCE
Refills: 0 | Status: DISCONTINUED | OUTPATIENT
Start: 2021-11-24 | End: 2021-12-03

## 2021-11-24 RX ADMIN — RISPERIDONE 4 MILLIGRAM(S): 4 TABLET ORAL at 15:04

## 2021-11-24 RX ADMIN — SERTRALINE 50 MILLIGRAM(S): 25 TABLET, FILM COATED ORAL at 15:04

## 2021-11-24 RX ADMIN — Medication 50 MILLIGRAM(S): at 21:04

## 2021-11-24 RX ADMIN — Medication 2 MILLIGRAM(S): at 21:04

## 2021-11-24 RX ADMIN — Medication 4 MILLIGRAM(S): at 21:04

## 2021-11-24 NOTE — ED BEHAVIORAL HEALTH ASSESSMENT NOTE - SUMMARY
Pt with h/o depression and anxiety presented to the ED for suicidal thoughts mentioning that he wants to "blow his brains out with a gun". Was d/c from [facility] earlier this month where he was admitted for rehab, patient with flat affect. Pt with h/o depression and anxiety presented to the ED after being denied from [facility] due to insurance, for suicidal thoughts mentioning that he wants to "blow his brains out with a gun". Was d/c from [facility] earlier this month where he was admitted for rehab, patient with flat affect. Patient request

## 2021-11-24 NOTE — ED BEHAVIORAL HEALTH ASSESSMENT NOTE - OTHER PAST PSYCHIATRIC HISTORY (INCLUDE DETAILS REGARDING ONSET, COURSE OF ILLNESS, INPATIENT/OUTPATIENT TREATMENT)
Presented to [facility] 10/29/21 with suicidal ideations, has received treatment at [facility] and [facility]

## 2021-11-24 NOTE — BH CHART NOTE - NSEVENTNOTEFT_PSY_ALL_CORE
HPI: per  assessment "47 y/o male with PMH of anxiety, depression, polysubstance abuse and GERD presents with suicidal ideation. Pt states that he wanted to "blow his brains out with a gun". Pt states he was d/c from [facility] earlier this month with  "a bag of medications" and then threw them away because he doesn't believe any of the medications were helping him. Utox positive for cocaine, states that he last used 4-5 days ago in addition to drinking beer daily. He states he is able to sleep 8 hours a night but is still tired, without energy and depressed. Pt wants to get better but does not know the best way to him since he doesn't believe the medications helped.  Patient presented to admissions at [facility] but was sent to [facility] for medical clearance. He lives in [place]."    Patient evaluated by admitting team, confirms the above findings. On assessment, patient says that he is feeling suicidal and depressed. Says that he has been feeling this way consistently over the last 6 months. Says that he was in inpatient psychiatry earlier this month and left and went home, throwing away all medications. Says that he has not taken any of his medications since discharge. Says that he has been on psych meds for the last 10-15 years. Denies any AVH, paranoia, or OCD symptoms currently. Says that when he came to ED, he had SI with intent to purchase gun and shoot self. Denies having access to firearm, saying that he did not look online where he should buy a gun or go to a store to purchase one. Currently denies passive death wish and SI. Says that he has feelings of wanting to hurt others, but currently has no violent thoughts or HI. Says that he smokes 15 cigarettes per day but drinks no alcohol (although has alcohol use disorder per records) and does not recreational drugs (Utox positive for cocaine).     PPH: see HPI    PMH: see HPI    MEDICATIONS  (STANDING):  risperiDONE   Tablet 4 milliGRAM(s) Oral daily  sertraline 50 milliGRAM(s) Oral daily    MEDICATIONS  (PRN):  acetaminophen     Tablet .. 650 milliGRAM(s) Oral every 6 hours PRN Mild Pain (1 - 3), Moderate Pain (4 - 6)  diphenhydrAMINE 50 milliGRAM(s) Oral every 4 hours PRN agitation  diphenhydrAMINE Injectable 25 milliGRAM(s) IntraMuscular once PRN Agitation/EPS Prophylaxis  haloperidol     Tablet 5 milliGRAM(s) Oral every 6 hours PRN Agitation  haloperidol    Injectable 5 milliGRAM(s) IntraMuscular once PRN Agitation  hydrOXYzine hydrochloride 50 milliGRAM(s) Oral every 6 hours PRN anxiety  LORazepam     Tablet 2 milliGRAM(s) Oral every 6 hours PRN Severe agitation  LORazepam   Injectable 2 milliGRAM(s) IntraMuscular once PRN agitation  nicotine  Polacrilex Gum 4 milliGRAM(s) Oral every 2 hours PRN smoking cessation  traZODone 50 milliGRAM(s) Oral at bedtime PRN Insomnia      Allergies: Anaphylactic to peanuts    Substance: Smokes 15 cigarettes per day but no alcohol or recreational drugs    Family Hx: Denies fam history of mental illness    Social Hx: Lives in [place] alone, on disability    Access to weapons/guns: denies    Vitals:  T(F): 98 (21 @ 11:43), Max: 98.2 (21 @ 05:06)  HR: 76 (21 @ 11:43) (76 - 97)  BP: 109/72 (21 @ 11:43) (109/72 - 135/79)  RR: 18 (21 @ 11:43) (14 - 18)  SpO2: 95% (21 @ 11:43) (95% - 98%)    MSE:  Appearance: appears stated age, dressed casually, well groomed. Behavior: cooperative, appropriate eye contact, in good behavioral control. Motor: no PMR/PMA. No abnormal movements including tremor. Speech: no increased latency, normal rate, tone, volume. TP: linear, goal-directed, sometimes perseverative. TC: Wants anxiety med and nicotine replacement. Currently denies SI/HI/I/P, no urges for self-harm. No apparent delusions. Denies AH/VH. Mood: "Depressed." Affect: Dysphoric, reactive, mood congruent, sometimes irritated. Cognition: alert, oriented to person, place, month and year. Fund of knowledge: intact. Attention/Concentration: intact. Insight: poor. Judgment: poor. Impulse control: poor.    Labs:                        14.1   9.73  )-----------( 242      ( 2021 04:47 )             40.6     -    142  |  104  |  16.0  ----------------------------<  95  4.4   |  22.0  |  0.82    Ca    9.5      2021 04:47      Urinalysis Basic - ( 2021 05:32 )    Color: Yellow / Appearance: Clear / S.010 / pH: x  Gluc: x / Ketone: Negative  / Bili: Negative / Urobili: Negative mg/dL   Blood: x / Protein: 30 mg/dL / Nitrite: Negative   Leuk Esterase: Negative / RBC: 0-2 /HPF / WBC 0-2   Sq Epi: x / Non Sq Epi: Occasional / Bacteria: Few      Drug Screen W/PCP, Urine: Done (21 @ 05:32)  COVID-19 PCR: NotDetec (21 @ 04:47)      Risk Assessment: Immediate risk is minimized by inpatient admission to safe environment with appropriate supervision and limited access to lethal means. Future risk to be minimized by treatment of acute depressive symptoms, maximizing outpatient supports, providing patient education, discussing emergency procedures, and ensuring close follow-up.    Acute risk factors include: single, male, current SI/I/P, hopelessness/helplessness, anxiety, agitation, active mood episode, poor reactivity to stressors, low frustration tolerance,  noncompliant with treatment/not receiving treatment, limited insight into symptoms, recent inpatient discharge, recent loss.  Chronic risk factors for suicide include: h/o prior psychiatric admissions, diagnosis of substance use & schizoaffective d/o.  Protective factors include: identifies reasons for living, future oriented, no active psychosis, stable housing, help-seeking behaviors    Based on risk assessment evaluation, patient is not at acute risk of harm to self or others at this time, does not require 1:1 CO.      Assessment/Plan:    47 y/o male with PMH of anxiety, depression, polysubstance abuse and GERD, recently d/c from [facility] earlier this month, noncompliant with medications, presenting with suicidal ideation with plan to shoot self in head. Patient has endorsed suicidal ideation, likely secondary to known schizoaffective disorder vs substance-induced mood disorder vs. cocaine withdrawal vs. major depressive episode. Patient at risk and needs inpatient hospitalization.    Plan:  1.	Legals: admit to [facility] on 2PC  2.	Safety: routine observation, denies SI/HI/I/P on the unit. PRNs: Ativan/Haldol/Benadryl PO/IM  3.	Psychiatry: Continue Risperdal and Zoloft. Leave mood stabilizer decision to day team  4.	Group, milieu, individual therapy as appropriate.  5.	Medical: continue home medications  6.	Dispo: pending clinical improvement.  Patient continues to require inpatient hospitalization for stabilization and safety.    Patient requires inpatient admission for stabilization. HPI: per  assessment "45 y/o male with PMH of anxiety, depression, polysubstance abuse and GERD presents with suicidal ideation. Pt states that he wanted to "blow his brains out with a gun". Pt states he was d/c from [facility] earlier this month with  "a bag of medications" and then threw them away because he doesn't believe any of the medications were helping him. Utox positive for cocaine, states that he last used 4-5 days ago in addition to drinking beer daily. He states he is able to sleep 8 hours a night but is still tired, without energy and depressed. Pt wants to get better but does not know the best way to him since he doesn't believe the medications helped.  Patient presented to admissions at [facility] but was sent to [facility] for medical clearance. He lives in [place]."    Patient evaluated by admitting team, confirms the above findings. On assessment, patient says that he is feeling suicidal and depressed. Says that he has been feeling this way consistently over the last 6 months. Says that he was in inpatient psychiatry earlier this month and left and went home, throwing away all medications. Says that he has not taken any of his medications since discharge. Says that he has been on psych meds for the last 10-15 years. Denies any AVH, paranoia, or OCD symptoms currently. Says that when he came to ED, he had SI with intent to purchase gun and shoot self. Denies having access to firearm, saying that he did not look online where he should buy a gun or go to a store to purchase one. Currently denies passive death wish and SI. Says that he has feelings of wanting to hurt others, but currently has no violent thoughts or HI. Says that he smokes 15 cigarettes per day but drinks no alcohol (although has alcohol use disorder per records) and does no recreational drugs (Utox positive for cocaine).     PPH: see HPI    PMH: see HPI    MEDICATIONS  (STANDING):  risperiDONE   Tablet 4 milliGRAM(s) Oral daily  sertraline 50 milliGRAM(s) Oral daily    MEDICATIONS  (PRN):  acetaminophen     Tablet .. 650 milliGRAM(s) Oral every 6 hours PRN Mild Pain (1 - 3), Moderate Pain (4 - 6)  diphenhydrAMINE 50 milliGRAM(s) Oral every 4 hours PRN agitation  diphenhydrAMINE Injectable 25 milliGRAM(s) IntraMuscular once PRN Agitation/EPS Prophylaxis  haloperidol     Tablet 5 milliGRAM(s) Oral every 6 hours PRN Agitation  haloperidol    Injectable 5 milliGRAM(s) IntraMuscular once PRN Agitation  hydrOXYzine hydrochloride 50 milliGRAM(s) Oral every 6 hours PRN anxiety  LORazepam     Tablet 2 milliGRAM(s) Oral every 6 hours PRN Severe agitation  LORazepam   Injectable 2 milliGRAM(s) IntraMuscular once PRN agitation  nicotine  Polacrilex Gum 4 milliGRAM(s) Oral every 2 hours PRN smoking cessation  traZODone 50 milliGRAM(s) Oral at bedtime PRN Insomnia      Allergies: Anaphylactic to peanuts    Substance: Smokes 15 cigarettes per day but no alcohol or recreational drugs    Family Hx: Denies fam history of mental illness    Social Hx: Lives in [place] alone, on disability    Access to weapons/guns: denies    Vitals:  T(F): 98 (21 @ 11:43), Max: 98.2 (21 @ 05:06)  HR: 76 (21 @ 11:43) (76 - 97)  BP: 109/72 (21 @ 11:43) (109/72 - 135/79)  RR: 18 (21 @ 11:43) (14 - 18)  SpO2: 95% (21 @ 11:43) (95% - 98%)    MSE:  Appearance: appears stated age, dressed casually, well groomed. Behavior: cooperative, appropriate eye contact, in good behavioral control. Motor: no PMR/PMA. No abnormal movements including tremor. Speech: no increased latency, normal rate, tone, volume. TP: linear, goal-directed, sometimes perseverative. TC: Wants anxiety med and nicotine replacement. Currently denies SI/HI/I/P, no urges for self-harm. No apparent delusions. Denies AH/VH. Mood: "Depressed." Affect: Dysphoric, reactive, mood congruent, sometimes irritated. Cognition: alert, oriented to person, place, month and year. Fund of knowledge: intact. Attention/Concentration: intact. Insight: poor. Judgment: poor. Impulse control: poor.    Labs:                        14.1   9.73  )-----------( 242      ( 2021 04:47 )             40.6     -    142  |  104  |  16.0  ----------------------------<  95  4.4   |  22.0  |  0.82    Ca    9.5      2021 04:47      Urinalysis Basic - ( 2021 05:32 )    Color: Yellow / Appearance: Clear / S.010 / pH: x  Gluc: x / Ketone: Negative  / Bili: Negative / Urobili: Negative mg/dL   Blood: x / Protein: 30 mg/dL / Nitrite: Negative   Leuk Esterase: Negative / RBC: 0-2 /HPF / WBC 0-2   Sq Epi: x / Non Sq Epi: Occasional / Bacteria: Few      Drug Screen W/PCP, Urine: Done (21 @ 05:32)  COVID-19 PCR: NotDetec (21 @ 04:47)      Risk Assessment: Immediate risk is minimized by inpatient admission to safe environment with appropriate supervision and limited access to lethal means. Future risk to be minimized by treatment of acute depressive symptoms, maximizing outpatient supports, providing patient education, discussing emergency procedures, and ensuring close follow-up.    Acute risk factors include: single, male, current SI/I/P, hopelessness/helplessness, anxiety, agitation, active mood episode, poor reactivity to stressors, low frustration tolerance,  noncompliant with treatment/not receiving treatment, limited insight into symptoms, recent inpatient discharge, recent loss.  Chronic risk factors for suicide include: h/o prior psychiatric admissions, diagnosis of substance use & schizoaffective d/o.  Protective factors include: identifies reasons for living, future oriented, no active psychosis, stable housing, help-seeking behaviors    Based on risk assessment evaluation, patient is not at acute risk of harm to self or others at this time, does not require 1:1 CO.      Assessment/Plan:    45 y/o male with PMH of anxiety, depression, multiple substance use disorders, and GERD, recently d/c from [facility] earlier this month, noncompliant with medications, presenting with suicidal ideation with plan to shoot self in head. Patient endorsed suicidal ideation (not present) and continues to have other depressive symptoms, likely secondary to known schizoaffective disorder vs substance-induced mood disorder vs. cocaine withdrawal vs. major depressive episode. Patient at risk and needs inpatient hospitalization.    Plan:  1.	Legals: admit to 4 on 2PC  2.	Safety: routine observation, denies SI/HI/I/P on the unit. PRNs: Ativan/Haldol/Benadryl PO/IM  3.	Psychiatry: Continue Risperdal and Zoloft. Leave mood stabilizer decision to day team  4.	Group, milieu, individual therapy as appropriate.  5.	Medical: continue home medications  6.	Dispo: pending clinical improvement.  Patient continues to require inpatient hospitalization for stabilization and safety.    Patient requires inpatient admission for stabilization.

## 2021-11-24 NOTE — ED PROVIDER NOTE - OBJECTIVE STATEMENT
46yom states he is depressed and having suicidal thoughts. Vague about onset of symptoms but states he is "at his wit's end" tonight. States he will take a gun and shoot himself in the head. Was recently admitted to [facility] for same. Denies any alcohol or drugs.

## 2021-11-24 NOTE — ED PROVIDER NOTE - CLINICAL SUMMARY MEDICAL DECISION MAKING FREE TEXT BOX
Depression and suicidality with plan, medically clear, awaiting psychiatric evaluation to determine need for admission.

## 2021-11-24 NOTE — ED BEHAVIORAL HEALTH ASSESSMENT NOTE - RISK ASSESSMENT
High Acute Suicide Risk High level of risk for dangerous behavior. Admits to SI and a plan to blow his brains out. H/o polysubstance abuse High level of risk for dangerous behavior. Admits to Suicidal ideation and a plan to blow his brains out. H/o polysubstance abuse High level of risk for dangerous behavior. Admits to Suicidal ideation and a plan to" blow his brains out". H/o polysubstance abuse

## 2021-11-24 NOTE — ED ADULT NURSE NOTE - NSIMPLEMENTINTERV_GEN_ALL_ED
Implemented All Universal Safety Interventions:  [place] to call system. Call bell, personal items and telephone within reach. Instruct patient to call for assistance. Room bathroom lighting operational. Non-slip footwear when patient is off stretcher. Physically safe environment: no spills, clutter or unnecessary equipment. Stretcher in lowest position, wheels locked, appropriate side rails in place.

## 2021-11-24 NOTE — ED BEHAVIORAL HEALTH ASSESSMENT NOTE - CASE SUMMARY
non compliant with medications Abusing drugs / alcohol Feels unsafe for discharge due to suicidal urges and hopelessness

## 2021-11-24 NOTE — ED BEHAVIORAL HEALTH ASSESSMENT NOTE - HPI (INCLUDE ILLNESS QUALITY, SEVERITY, DURATION, TIMING, CONTEXT, MODIFYING FACTORS, ASSOCIATED SIGNS AND SYMPTOMS)
45 y/o male with PMH of anxiety, depression, polysubstance abuse and GERD presents with suicidal ideation. Pt states that he wanted to "blow his brains out with a gun". Pt states he was d/c from [facility] earlier this month with  "a bag of medications" and then threw them away because he doesn't believe any of the medications were helping him. Utox positive for cocaine, states that he last used 4-5 days ago in addition to drinking beer daily. He states he is able to sleep 8 hours a night but is still tired, without energy and depressed. Pt wants to get better but does not know the best way to him since he doesn't believe the medications helped. 45 y/o male with PMH of anxiety, depression, polysubstance abuse and GERD presents with suicidal ideation. Pt states that he wanted to "blow his brains out with a gun". Pt states he was d/c from [facility] earlier this month with  "a bag of medications" and then threw them away because he doesn't believe any of the medications were helping him. Utox positive for cocaine, states that he last used 4-5 days ago in addition to drinking beer daily. He states he is able to sleep 8 hours a night but is still tired, without energy and depressed. Pt wants to get better but does not know the best way to him since he doesn't believe the medications helped.    Pt. Denies symptoms of COVID19  Reports receiving J&J vaccine in september   Pt. Denies leaving [place] within the past 2 weeks 45 y/o male with PMH of anxiety, depression, polysubstance abuse and GERD presents with suicidal ideation. Pt states that he wanted to "blow his brains out with a gun". Pt states he was d/c from [facility] earlier this month with  "a bag of medications" and then threw them away because he doesn't believe any of the medications were helping him. Utox positive for cocaine, states that he last used 4-5 days ago in addition to drinking beer daily. He states he is able to sleep 8 hours a night but is still tired, without energy and depressed. Pt wants to get better but does not know the best way to him since he doesn't believe the medications helped.  Patient presented to admissions at [facility] but was sent to [facility] for medical clearance. He lives in [place].    Pt. Denies symptoms of COVID19  Reports receiving J&J vaccine in september   Pt. Denies leaving [place] within the past 2 weeks

## 2021-11-24 NOTE — ED BEHAVIORAL HEALTH ASSESSMENT NOTE - DESCRIPTION
GERD Patient presented to ED with suicidal ideation, appears tired with blunted affect on examination Was previously homeless. Unemployed.

## 2021-11-24 NOTE — ED BEHAVIORAL HEALTH ASSESSMENT NOTE - RESIDENTIAL SUBSTANCE FACILITY
[facility] [place] [facility] [place]

## 2021-11-24 NOTE — ED BEHAVIORAL HEALTH ASSESSMENT NOTE - PAST PSYCHOTROPIC MEDICATION
Topamax, zoloft, trazodone, risperidone, lithium, haloperidol, lorazepam, sertraline, olanzapine, paliperidone

## 2021-11-24 NOTE — ED ADULT NURSE NOTE - HPI (INCLUDE ILLNESS QUALITY, SEVERITY, DURATION, TIMING, CONTEXT, MODIFYING FACTORS, ASSOCIATED SIGNS AND SYMPTOMS)
Pt rec'd to  in wns, patient wand by security for contraband.  Pt states "I want to kill myself  with plan of shooting self in head with a gun.  Pt states that he does not have access to a gun but thenstates that he could get on very easily.  Pt states that he is angry in general at everything and everyone and wants to hurt others.  Pt states that it got to the breaking point today but has adam building for while.  Pt states that his last hospitalization was at [facility] about 2 months and stayed there for about 3 weeks patient states tht he has had no follow or out patient services and has no Psych md.  Pt states that  he was given meds to take but states it was "junk" and threw them away.  Pt states he has history of depression, SAD and OCD. Pt states that he having SI and HI but no AH or VH. Ptstatesthat he lives alone in [place] and took train to [place] and went to [facility] and they sent him to Er.  Pt states that he has been biting and not sleeping well and states that he uses Benadryl to help him sleep. Pt rec'd to  in wns, patient wand by security for contraband.  Pt states "I want to kill myself  with plan of shooting self in head with a gun.  Pt states that he does not have access to a gun but thenstates that he could get on very easily.  Pt states that he is angry in general at everything and everyone and wants to hurt others.  Pt states that it got to the breaking point today but has been building for while.  Pt states that his last hospitalization was at [facility] about 2 months and stayed there for about 3 weeks patient states that he has had no followup or out patient services and has no Psych md.  Pt states that  he was given meds to take but states it was "junk" and threw them away.  Pt states he has history of depression, SAD and OCD. Pt states that he having SI and HI but no AH or VH. Pt states that he lives alone in [place] and took train to [place] and went to [facility] and they sent him to Er.  Pt denies any smoking, ETOH or rec drug use  Pt states that he has been biting and not sleeping well and states that he uses Benadryl to help him sleep.

## 2021-11-24 NOTE — ED ADULT TRIAGE NOTE - CHIEF COMPLAINT QUOTE
Pt BIBEMS for psych clearance stating he wants to be admitted to [facility]. Admits to SI thoughts at this time. Patient is calm and cooperative. Placed in yellow gown with belongings secured.

## 2021-11-24 NOTE — ED ADULT NURSE NOTE - NSICDXPASTMEDICALHX_GEN_ALL_CORE_FT
PAST MEDICAL HISTORY:  Anxiety     Anxiety depression      Bipolar disorder     GERD (gastroesophageal reflux disease)     H/O: depression     OCD (obsessive compulsive disorder)     Polysubstance abuse     Schizoaffective disorder

## 2021-11-24 NOTE — CHART NOTE - NSCHARTNOTEFT_GEN_A_CORE
SW Note: Notified by  provider that the plan is to transfer pt for inpt psychiatric care. Referral made to [facility]. Last inpt tx: [facility] ( D/C'd 11/9/21). Pts primary ins is medicare but he has exhausted his  days. SW to follow

## 2021-11-25 DIAGNOSIS — F14.10 COCAINE ABUSE, UNCOMPLICATED: ICD-10-CM

## 2021-11-25 PROCEDURE — 99222 1ST HOSP IP/OBS MODERATE 55: CPT

## 2021-11-25 RX ORDER — NICOTINE POLACRILEX 2 MG
1 GUM BUCCAL DAILY
Refills: 0 | Status: DISCONTINUED | OUTPATIENT
Start: 2021-11-25 | End: 2021-12-03

## 2021-11-25 RX ADMIN — Medication 4 MILLIGRAM(S): at 14:00

## 2021-11-25 RX ADMIN — Medication 50 MILLIGRAM(S): at 20:56

## 2021-11-25 RX ADMIN — SERTRALINE 50 MILLIGRAM(S): 25 TABLET, FILM COATED ORAL at 08:35

## 2021-11-25 RX ADMIN — Medication 50 MILLIGRAM(S): at 14:00

## 2021-11-25 RX ADMIN — Medication 4 MILLIGRAM(S): at 11:16

## 2021-11-25 RX ADMIN — Medication 4 MILLIGRAM(S): at 17:24

## 2021-11-25 RX ADMIN — Medication 650 MILLIGRAM(S): at 13:59

## 2021-11-25 RX ADMIN — Medication 2 MILLIGRAM(S): at 20:56

## 2021-11-25 RX ADMIN — Medication 4 MILLIGRAM(S): at 20:58

## 2021-11-25 RX ADMIN — RISPERIDONE 4 MILLIGRAM(S): 4 TABLET ORAL at 08:35

## 2021-11-25 RX ADMIN — Medication 2 MILLIGRAM(S): at 08:38

## 2021-11-25 RX ADMIN — Medication 50 MILLIGRAM(S): at 20:55

## 2021-11-25 RX ADMIN — Medication 4 MILLIGRAM(S): at 08:39

## 2021-11-25 RX ADMIN — Medication 1 PATCH: at 09:30

## 2021-11-25 NOTE — BH INPATIENT PSYCHIATRY ASSESSMENT NOTE - NSBHCHARTREVIEWVS_PSY_A_CORE FT
Vital Signs Last 24 Hrs  T(C): 36.2 (11-25-21 @ 06:39), Max: 36.7 (11-24-21 @ 11:43)  T(F): 97.1 (11-25-21 @ 06:39), Max: 98 (11-24-21 @ 11:43)  HR: 76 (11-24-21 @ 11:43) (76 - 76)  BP: 109/72 (11-24-21 @ 11:43) (109/72 - 109/72)  BP(mean): --  RR: 16 (11-24-21 @ 22:41) (16 - 18)  SpO2: 95% (11-24-21 @ 11:43) (95% - 95%)    Orthostatic VS  11-24-21 @ 22:41  Lying BP: --/-- HR: --  Sitting BP: 130/89 HR: 79  Standing BP: 129/77 HR: 87  Site: --  Mode: --   Vital Signs Last 24 Hrs  T(C): 36.2 (11-25-21 @ 06:39), Max: 36.7 (11-24-21 @ 11:43)  T(F): 97.1 (11-25-21 @ 06:39), Max: 98 (11-24-21 @ 11:43)  HR: 76 (11-24-21 @ 11:43) (76 - 76)  BP: 109/72 (11-24-21 @ 11:43) (109/72 - 109/72)  BP(mean): --  RR: 16 (11-24-21 @ 22:41) (16 - 18)  SpO2: 95% (11-24-21 @ 11:43) (95% - 95%)    Orthostatic VS  11-25-21 @ 08:06  Lying BP: --/-- HR: --  Sitting BP: 120/64 HR: 78  Standing BP: 120/69 HR: 78  Site: --  Mode: --  Orthostatic VS  11-24-21 @ 22:41  Lying BP: --/-- HR: --  Sitting BP: 130/89 HR: 79  Standing BP: 129/77 HR: 87  Site: --  Mode: --   Vital Signs Last 24 Hrs  T(C): 36.2 (11-25-21 @ 06:39), Max: 36.7 (11-24-21 @ 22:41)  T(F): 97.1 (11-25-21 @ 06:39), Max: 98 (11-24-21 @ 22:41)  HR: --  BP: --  BP(mean): --  RR: 16 (11-24-21 @ 22:41) (16 - 16)  SpO2: --    Orthostatic VS  11-25-21 @ 08:06  Lying BP: --/-- HR: --  Sitting BP: 120/64 HR: 78  Standing BP: 120/69 HR: 78  Site: --  Mode: --  Orthostatic VS  11-24-21 @ 22:41  Lying BP: --/-- HR: --  Sitting BP: 130/89 HR: 79  Standing BP: 129/77 HR: 87  Site: --  Mode: --

## 2021-11-25 NOTE — BH INPATIENT PSYCHIATRY ASSESSMENT NOTE - RISK ASSESSMENT
High level of risk for dangerous behavior. Admits to Suicidal ideation and a plan to" blow his brains out". H/o polysubstance abuse

## 2021-11-25 NOTE — BH INPATIENT PSYCHIATRY ASSESSMENT NOTE - CURRENT MEDICATION
MEDICATIONS  (STANDING):  risperiDONE   Tablet 4 milliGRAM(s) Oral daily  sertraline 50 milliGRAM(s) Oral daily    MEDICATIONS  (PRN):  acetaminophen     Tablet .. 650 milliGRAM(s) Oral every 6 hours PRN Mild Pain (1 - 3), Moderate Pain (4 - 6)  diphenhydrAMINE 50 milliGRAM(s) Oral every 4 hours PRN agitation  diphenhydrAMINE Injectable 25 milliGRAM(s) IntraMuscular once PRN Agitation/EPS Prophylaxis  haloperidol     Tablet 5 milliGRAM(s) Oral every 6 hours PRN Agitation  haloperidol    Injectable 5 milliGRAM(s) IntraMuscular once PRN Agitation  hydrOXYzine hydrochloride 50 milliGRAM(s) Oral every 6 hours PRN anxiety  LORazepam     Tablet 2 milliGRAM(s) Oral every 6 hours PRN Severe agitation  LORazepam   Injectable 2 milliGRAM(s) IntraMuscular once PRN agitation  nicotine  Polacrilex Gum 4 milliGRAM(s) Oral every 2 hours PRN smoking cessation  traZODone 50 milliGRAM(s) Oral at bedtime PRN Insomnia   MEDICATIONS  (STANDING):  nicotine - 21 mG/24Hr(s) Patch 1 patch Transdermal daily  risperiDONE   Tablet 4 milliGRAM(s) Oral daily  sertraline 50 milliGRAM(s) Oral daily    MEDICATIONS  (PRN):  acetaminophen     Tablet .. 650 milliGRAM(s) Oral every 6 hours PRN Mild Pain (1 - 3), Moderate Pain (4 - 6)  diphenhydrAMINE 50 milliGRAM(s) Oral every 4 hours PRN agitation  diphenhydrAMINE Injectable 25 milliGRAM(s) IntraMuscular once PRN Agitation/EPS Prophylaxis  haloperidol     Tablet 5 milliGRAM(s) Oral every 6 hours PRN Agitation  haloperidol    Injectable 5 milliGRAM(s) IntraMuscular once PRN Agitation  hydrOXYzine hydrochloride 50 milliGRAM(s) Oral every 6 hours PRN anxiety  LORazepam     Tablet 2 milliGRAM(s) Oral every 6 hours PRN Severe agitation  LORazepam   Injectable 2 milliGRAM(s) IntraMuscular once PRN agitation  nicotine  Polacrilex Gum 4 milliGRAM(s) Oral every 2 hours PRN smoking cessation  traZODone 50 milliGRAM(s) Oral at bedtime PRN Insomnia

## 2021-11-25 NOTE — BH INPATIENT PSYCHIATRY ASSESSMENT NOTE - HPI (INCLUDE ILLNESS QUALITY, SEVERITY, DURATION, TIMING, CONTEXT, MODIFYING FACTORS, ASSOCIATED SIGNS AND SYMPTOMS)
45 y/o male with PMH of anxiety, depression, polysubstance abuse and GERD presents with suicidal ideation. Pt states that he wanted to "blow his brains out with a gun". Pt states he was d/c from [facility] earlier this month with  "a bag of medications" and then threw them away because he doesn't believe any of the medications were helping him. Utox positive for cocaine, states that he last used 4-5 days ago in addition to drinking beer daily. He states he is able to sleep 8 hours a night but is still tired, without energy and depressed. Pt wants to get better but does not know the best way to him since he doesn't believe the medications helped.  Patient presented to admissions at [facility] but was sent to [facility] for medical clearance. He lives in [place].  Pt. Denies symptoms of COVID19  Reports receiving J&J vaccine in september   Pt. Denies leaving [place] within the past 2 weeks     Patient was seen and evaluated, chart reviewed. Case discussed with nursing team.  On service for this 47 y/o  male  with PPHx of Major Depressive  Disorder, Anxiety and Polysubstance Abuse. Patient is hospitalized with a primary problem of worsening depression with SI.  Patient admitted to [facility] on a 9.27 legal status. I have reviewed the initial psychiatric assessment in the electronic medical record, including the history of present illness, past psychiatric history, family/social history (no pertinent changes), and exam, and have confirmed the salient findings dated .  As per chart review, transferring records indicated the followin y/o male with PMH of anxiety, depression, polysubstance abuse and GERD presents with suicidal ideation. Pt states that he wanted to "blow his brains out with a gun". Pt states he was d/c from [facility] earlier this month with  "a bag of medications" and then threw them away because he doesn't believe any of the medications were helping him. Utox positive for cocaine, states that he last used 4-5 days ago in addition to drinking beer daily. He states he is able to sleep 8 hours a night but is still tired, without energy and depressed. Pt wants to get better but does not know the best way to him since he doesn't believe the medications helped.  Patient presented to admissions at [facility] but was sent to [facility] for medical clearance. He lives in [place].  Pt. Denies symptoms of COVID19  Reports receiving J&J vaccine in september   Pt. Denies leaving [place] within the past 2 weeks    On unit: Information Received From: Chart review and patient interview  Discussed precipitants to warrant services. Patient reports depressive symptoms started several weeks ago after he was discharged from [facility].  Patient reports he threw out his medications because he believed they would not work.  Patient reports  symptoms are persistent most of the day, more days than none. Patient describes daily low mood, and anxiety.  Also reports neurovegative symptoms of difficulty concentrating, poor appetite, insomnia, feelings of sadness, anhedonia, hopelessness, worthlessness, amotivational, feelings of restlessness/anxiety. Client reports she feels stressed and overwhelmed. Patient reports he was +SI with intent to purchase gun and shoot self. Patient reports he lhrhg5d to shot self in head to “make it fatal.” Denies having access to firearm but states “I can buy one”  Currently denies passive death wish and SI. Says that he was having feelings of wanting to hurt others, but currently has no violent thoughts or HI. Patient reports feeling safe on unit.  Patient’s facial expression, demeanor, posture/attitude during interview convey an underlying depressed/anxious mood. At time of interview patient denies SI/SIB/HI, no formulated plan or intent, and engages in safety planning.    Denies AVH, delusions, psychotic disorganization or paranoia. Patient denies any symptoms suggestive of sania: (denied grandiosity/ racing thoughts/ increased goal directed activities or engaged in risk taking behavior/ no pressured speech/ no elevated mood/ denied any increased in energy level causing sleep disruption). Patient denies any past trauma experience.  Patient is also abusing cocaine, admitting labs +u-tox cocaine, pt denies use states “I must of smoked something.” Reports allergies peanuts (throat closes).

## 2021-11-25 NOTE — BH INPATIENT PSYCHIATRY ASSESSMENT NOTE - OTHER PAST PSYCHIATRIC HISTORY (INCLUDE DETAILS REGARDING ONSET, COURSE OF ILLNESS, INPATIENT/OUTPATIENT TREATMENT)
Presented to [facility] 10/29/21 with suicidal ideations, has received treatment at [facility] and [facility]

## 2021-11-25 NOTE — PSYCHIATRIC REHAB INITIAL EVALUATION - NSBHPRRECOMMEND_PSY_ALL_CORE
Writer met patient in order to orient patient to unit and briefly introduce patient to psychiatric rehabilitation staff and department function. Pt was provided with a copy of unit schedule. Pt is not a reliable historian, as patient reports never being admitted in a psych setting despite chart reporting that patient was recently admitted to [facility]. Chart reports patient presents with increased SI with plan to shoot self in head. Patient has no access to guns however chart reports that patient would buy one if he wanted.  Patient denies current SI and is able to contract fro safety. patient and writer established a collaborative psychiatric rehabilitation goal. Writer encouraged patient to attend psychiatric rehabilitation groups and engage in treatment.  Psychiatric rehabilitation staff will engage patient daily.

## 2021-11-25 NOTE — BH INPATIENT PSYCHIATRY ASSESSMENT NOTE - NSBHMETABOLIC_PSY_ALL_CORE_FT
BMI: BMI (kg/m2): 28.4 (11-24-21 @ 22:41)  HbA1c: A1C with Estimated Average Glucose Result: 5.0 % (11-09-21 @ 11:08)    Glucose:   BP: --  Lipid Panel: Date/Time: 11-09-21 @ 11:08  Cholesterol, Serum: 208  Direct LDL: --  HDL Cholesterol, Serum: 42  Total Cholesterol/HDL Ration Measurement: --  Triglycerides, Serum: 579

## 2021-11-25 NOTE — BH INPATIENT PSYCHIATRY ASSESSMENT NOTE - NSBHASSESSSUMMFT_PSY_ALL_CORE
Plan:  1.	Legals:  2PC  2.	Safety: routine observation, denies SI/HI/I/P on the unit. PRNs: Ativan/Haldol/Benadryl PO/IM  3.	Psychiatry: Continue Risperdal and Zoloft.   4.	Group, milieu, individual therapy as appropriate.  5.	Medical: continue home medications  6.	Dispo: pending clinical improvement.  Patient continues to require inpatient hospitalization for stabilization and safety.

## 2021-11-26 LAB
COVID-19 NUCLEOCAPSID GAM AB INTERP: NEGATIVE — SIGNIFICANT CHANGE UP
COVID-19 NUCLEOCAPSID TOTAL GAM ANTIBODY RESULT: 0.08 INDEX — SIGNIFICANT CHANGE UP
COVID-19 SPIKE DOMAIN AB INTERP: POSITIVE
COVID-19 SPIKE DOMAIN ANTIBODY RESULT: 41.6 U/ML — HIGH
SARS-COV-2 IGG+IGM SERPL QL IA: 0.08 INDEX — SIGNIFICANT CHANGE UP
SARS-COV-2 IGG+IGM SERPL QL IA: 41.6 U/ML — HIGH
SARS-COV-2 IGG+IGM SERPL QL IA: NEGATIVE — SIGNIFICANT CHANGE UP
SARS-COV-2 IGG+IGM SERPL QL IA: POSITIVE

## 2021-11-26 PROCEDURE — 99232 SBSQ HOSP IP/OBS MODERATE 35: CPT

## 2021-11-26 RX ORDER — SERTRALINE 25 MG/1
100 TABLET, FILM COATED ORAL DAILY
Refills: 0 | Status: DISCONTINUED | OUTPATIENT
Start: 2021-11-26 | End: 2021-12-01

## 2021-11-26 RX ADMIN — Medication 2 MILLIGRAM(S): at 21:32

## 2021-11-26 RX ADMIN — Medication 50 MILLIGRAM(S): at 21:32

## 2021-11-26 RX ADMIN — Medication 4 MILLIGRAM(S): at 12:29

## 2021-11-26 RX ADMIN — Medication 1 PATCH: at 08:37

## 2021-11-26 RX ADMIN — Medication 4 MILLIGRAM(S): at 10:09

## 2021-11-26 RX ADMIN — RISPERIDONE 4 MILLIGRAM(S): 4 TABLET ORAL at 08:37

## 2021-11-26 RX ADMIN — HALOPERIDOL DECANOATE 5 MILLIGRAM(S): 100 INJECTION INTRAMUSCULAR at 14:01

## 2021-11-26 RX ADMIN — Medication 50 MILLIGRAM(S): at 10:08

## 2021-11-26 RX ADMIN — Medication 650 MILLIGRAM(S): at 19:43

## 2021-11-26 RX ADMIN — SERTRALINE 50 MILLIGRAM(S): 25 TABLET, FILM COATED ORAL at 08:37

## 2021-11-26 RX ADMIN — Medication 4 MILLIGRAM(S): at 18:19

## 2021-11-26 RX ADMIN — Medication 4 MILLIGRAM(S): at 07:28

## 2021-11-26 RX ADMIN — Medication 4 MILLIGRAM(S): at 15:15

## 2021-11-26 RX ADMIN — Medication 50 MILLIGRAM(S): at 19:44

## 2021-11-26 RX ADMIN — Medication 2 MILLIGRAM(S): at 13:39

## 2021-11-26 RX ADMIN — Medication 650 MILLIGRAM(S): at 10:08

## 2021-11-26 NOTE — BH INPATIENT PSYCHIATRY PROGRESS NOTE - PRN MEDS
MEDICATIONS  (PRN):  acetaminophen     Tablet .. 650 milliGRAM(s) Oral every 6 hours PRN Mild Pain (1 - 3), Moderate Pain (4 - 6)  diphenhydrAMINE 50 milliGRAM(s) Oral every 4 hours PRN agitation  diphenhydrAMINE Injectable 25 milliGRAM(s) IntraMuscular once PRN Agitation/EPS Prophylaxis  haloperidol     Tablet 5 milliGRAM(s) Oral every 6 hours PRN Agitation  haloperidol    Injectable 5 milliGRAM(s) IntraMuscular once PRN Agitation  hydrOXYzine hydrochloride 50 milliGRAM(s) Oral every 6 hours PRN anxiety  LORazepam     Tablet 2 milliGRAM(s) Oral every 6 hours PRN Severe agitation  LORazepam   Injectable 2 milliGRAM(s) IntraMuscular once PRN agitation  nicotine  Polacrilex Gum 4 milliGRAM(s) Oral every 2 hours PRN smoking cessation  traZODone 50 milliGRAM(s) Oral at bedtime PRN Insomnia

## 2021-11-26 NOTE — BH INPATIENT PSYCHIATRY PROGRESS NOTE - CURRENT MEDICATION
MEDICATIONS  (STANDING):  nicotine - 21 mG/24Hr(s) Patch 1 patch Transdermal daily  risperiDONE   Tablet 4 milliGRAM(s) Oral daily  sertraline 50 milliGRAM(s) Oral daily    MEDICATIONS  (PRN):  acetaminophen     Tablet .. 650 milliGRAM(s) Oral every 6 hours PRN Mild Pain (1 - 3), Moderate Pain (4 - 6)  diphenhydrAMINE 50 milliGRAM(s) Oral every 4 hours PRN agitation  diphenhydrAMINE Injectable 25 milliGRAM(s) IntraMuscular once PRN Agitation/EPS Prophylaxis  haloperidol     Tablet 5 milliGRAM(s) Oral every 6 hours PRN Agitation  haloperidol    Injectable 5 milliGRAM(s) IntraMuscular once PRN Agitation  hydrOXYzine hydrochloride 50 milliGRAM(s) Oral every 6 hours PRN anxiety  LORazepam     Tablet 2 milliGRAM(s) Oral every 6 hours PRN Severe agitation  LORazepam   Injectable 2 milliGRAM(s) IntraMuscular once PRN agitation  nicotine  Polacrilex Gum 4 milliGRAM(s) Oral every 2 hours PRN smoking cessation  traZODone 50 milliGRAM(s) Oral at bedtime PRN Insomnia

## 2021-11-26 NOTE — BH INPATIENT PSYCHIATRY PROGRESS NOTE - NSBHASSESSSUMMFT_PSY_ALL_CORE
Admitted for worsening depression after self presented to [facility] and sent for medical clearance to [facility].  Denies SI today but reports severe depression.      Plan:  1.	Legals:  2PC  2.	Safety: routine observation, denies SI/HI/I/P on the unit. PRNs: Ativan/Haldol/Benadryl PO/IM  3.	Psychiatry: Continue Risperdal and Zoloft. Increase Zoloft. Start CIWA with sx triggered Ativan in case of emergence of withdrawal.   4.	Group, milieu, individual therapy as appropriate.  5.	Medical: continue home medications  6.	Dispo: pending clinical improvement.  Patient continues to require inpatient hospitalization for stabilization and safety.

## 2021-11-26 NOTE — BH SOCIAL WORK INITIAL PSYCHOSOCIAL EVALUATION - OTHER PAST PSYCHIATRIC HISTORY (INCLUDE DETAILS REGARDING ONSET, COURSE OF ILLNESS, INPATIENT/OUTPATIENT TREATMENT)
Pt is a 47 y/o male domiciled in [place],  of homelessness, unemployed, with PPHx of anxiety, depression, polysubstance abuse, d/c from [facility] earlier this month where he was admitted for rehab, pt admits to throwing medications away upon discharge. Pt presented to ED with SI, states that he wanted to "blow his brains out with a gun".  Pt is a 45 y/o male domiciled alone in private apartment in [place],  of homelessness, unemployed, with PPHx of anxiety, depression, polysubstance abuse, d/c from [facility] earlier this month where he was admitted for rehab, pt admits to medication non compliance upon discharge. Pt presented to ED with suicidal ideation.

## 2021-11-26 NOTE — BH INPATIENT PSYCHIATRY PROGRESS NOTE - NSBHCHARTREVIEWVS_PSY_A_CORE FT
Vital Signs Last 24 Hrs  T(C): 36.1 (11-26-21 @ 06:20), Max: 36.9 (11-25-21 @ 15:04)  T(F): 96.9 (11-26-21 @ 06:20), Max: 98.4 (11-25-21 @ 15:04)  HR: --  BP: --  BP(mean): --  RR: --  SpO2: --    Orthostatic VS  11-26-21 @ 08:09  Lying BP: --/-- HR: --  Sitting BP: 117/72 HR: 80  Standing BP: --/-- HR: --  Site: --  Mode: --  Orthostatic VS  11-25-21 @ 19:21  Lying BP: --/-- HR: --  Sitting BP: 128/73 HR: 103  Standing BP: 122/85 HR: 105  Site: --  Mode: --  Orthostatic VS  11-25-21 @ 08:06  Lying BP: --/-- HR: --  Sitting BP: 120/64 HR: 78  Standing BP: 120/69 HR: 78  Site: --  Mode: --  Orthostatic VS  11-24-21 @ 22:41  Lying BP: --/-- HR: --  Sitting BP: 130/89 HR: 79  Standing BP: 129/77 HR: 87  Site: --  Mode: --

## 2021-11-27 PROCEDURE — 99232 SBSQ HOSP IP/OBS MODERATE 35: CPT

## 2021-11-27 RX ORDER — ARIPIPRAZOLE 15 MG/1
5 TABLET ORAL DAILY
Refills: 0 | Status: DISCONTINUED | OUTPATIENT
Start: 2021-11-27 | End: 2021-11-29

## 2021-11-27 RX ADMIN — Medication 1 PATCH: at 08:15

## 2021-11-27 RX ADMIN — SERTRALINE 100 MILLIGRAM(S): 25 TABLET, FILM COATED ORAL at 08:14

## 2021-11-27 RX ADMIN — Medication 4 MILLIGRAM(S): at 20:24

## 2021-11-27 RX ADMIN — ARIPIPRAZOLE 5 MILLIGRAM(S): 15 TABLET ORAL at 10:12

## 2021-11-27 RX ADMIN — Medication 4 MILLIGRAM(S): at 17:46

## 2021-11-27 RX ADMIN — Medication 1 PATCH: at 08:16

## 2021-11-27 RX ADMIN — Medication 50 MILLIGRAM(S): at 18:31

## 2021-11-27 RX ADMIN — Medication 2 MILLIGRAM(S): at 14:51

## 2021-11-27 RX ADMIN — Medication 650 MILLIGRAM(S): at 10:12

## 2021-11-27 RX ADMIN — Medication 2 MILLIGRAM(S): at 20:55

## 2021-11-27 RX ADMIN — Medication 50 MILLIGRAM(S): at 17:47

## 2021-11-27 RX ADMIN — Medication 4 MILLIGRAM(S): at 14:50

## 2021-11-27 RX ADMIN — Medication 650 MILLIGRAM(S): at 20:55

## 2021-11-27 RX ADMIN — Medication 4 MILLIGRAM(S): at 10:13

## 2021-11-27 RX ADMIN — Medication 50 MILLIGRAM(S): at 10:12

## 2021-11-27 RX ADMIN — Medication 4 MILLIGRAM(S): at 07:39

## 2021-11-27 RX ADMIN — Medication 4 MILLIGRAM(S): at 13:03

## 2021-11-27 RX ADMIN — Medication 50 MILLIGRAM(S): at 20:24

## 2021-11-27 NOTE — BH INPATIENT PSYCHIATRY PROGRESS NOTE - CURRENT MEDICATION
MEDICATIONS  (STANDING):  nicotine - 21 mG/24Hr(s) Patch 1 patch Transdermal daily  risperiDONE   Tablet 4 milliGRAM(s) Oral daily  sertraline 100 milliGRAM(s) Oral daily    MEDICATIONS  (PRN):  acetaminophen     Tablet .. 650 milliGRAM(s) Oral every 6 hours PRN Mild Pain (1 - 3), Moderate Pain (4 - 6)  diphenhydrAMINE 50 milliGRAM(s) Oral every 4 hours PRN agitation  diphenhydrAMINE Injectable 25 milliGRAM(s) IntraMuscular once PRN Agitation/EPS Prophylaxis  haloperidol     Tablet 5 milliGRAM(s) Oral every 6 hours PRN Agitation  haloperidol    Injectable 5 milliGRAM(s) IntraMuscular once PRN Agitation  hydrOXYzine hydrochloride 50 milliGRAM(s) Oral every 6 hours PRN anxiety  LORazepam     Tablet 2 milliGRAM(s) Oral every 6 hours PRN Severe agitation  LORazepam     Tablet 2 milliGRAM(s) Oral every 2 hours PRN CIWA score increase by 2 points and current CIWA score GREATER THAN 9  LORazepam   Injectable 2 milliGRAM(s) IntraMuscular every 2 hours PRN CIWA score increase by 2 points and current CIWA score GREATER THAN 9  LORazepam   Injectable 2 milliGRAM(s) IntraMuscular once PRN agitation  nicotine  Polacrilex Gum 4 milliGRAM(s) Oral every 2 hours PRN smoking cessation  traZODone 50 milliGRAM(s) Oral at bedtime PRN Insomnia   MEDICATIONS  (STANDING):  ARIPiprazole 5 milliGRAM(s) Oral daily  nicotine - 21 mG/24Hr(s) Patch 1 patch Transdermal daily  sertraline 100 milliGRAM(s) Oral daily    MEDICATIONS  (PRN):  acetaminophen     Tablet .. 650 milliGRAM(s) Oral every 6 hours PRN Mild Pain (1 - 3), Moderate Pain (4 - 6)  diphenhydrAMINE 50 milliGRAM(s) Oral every 4 hours PRN agitation  diphenhydrAMINE Injectable 25 milliGRAM(s) IntraMuscular once PRN Agitation/EPS Prophylaxis  haloperidol     Tablet 5 milliGRAM(s) Oral every 6 hours PRN Agitation  haloperidol    Injectable 5 milliGRAM(s) IntraMuscular once PRN Agitation  hydrOXYzine hydrochloride 50 milliGRAM(s) Oral every 6 hours PRN anxiety  LORazepam     Tablet 2 milliGRAM(s) Oral every 6 hours PRN Severe agitation  LORazepam     Tablet 2 milliGRAM(s) Oral every 2 hours PRN CIWA score increase by 2 points and current CIWA score GREATER THAN 9  LORazepam   Injectable 2 milliGRAM(s) IntraMuscular every 2 hours PRN CIWA score increase by 2 points and current CIWA score GREATER THAN 9  LORazepam   Injectable 2 milliGRAM(s) IntraMuscular once PRN agitation  nicotine  Polacrilex Gum 4 milliGRAM(s) Oral every 2 hours PRN smoking cessation  traZODone 50 milliGRAM(s) Oral at bedtime PRN Insomnia

## 2021-11-27 NOTE — BH INPATIENT PSYCHIATRY PROGRESS NOTE - PRN MEDS
MEDICATIONS  (PRN):  acetaminophen     Tablet .. 650 milliGRAM(s) Oral every 6 hours PRN Mild Pain (1 - 3), Moderate Pain (4 - 6)  diphenhydrAMINE 50 milliGRAM(s) Oral every 4 hours PRN agitation  diphenhydrAMINE Injectable 25 milliGRAM(s) IntraMuscular once PRN Agitation/EPS Prophylaxis  haloperidol     Tablet 5 milliGRAM(s) Oral every 6 hours PRN Agitation  haloperidol    Injectable 5 milliGRAM(s) IntraMuscular once PRN Agitation  hydrOXYzine hydrochloride 50 milliGRAM(s) Oral every 6 hours PRN anxiety  LORazepam     Tablet 2 milliGRAM(s) Oral every 6 hours PRN Severe agitation  LORazepam     Tablet 2 milliGRAM(s) Oral every 2 hours PRN CIWA score increase by 2 points and current CIWA score GREATER THAN 9  LORazepam   Injectable 2 milliGRAM(s) IntraMuscular every 2 hours PRN CIWA score increase by 2 points and current CIWA score GREATER THAN 9  LORazepam   Injectable 2 milliGRAM(s) IntraMuscular once PRN agitation  nicotine  Polacrilex Gum 4 milliGRAM(s) Oral every 2 hours PRN smoking cessation  traZODone 50 milliGRAM(s) Oral at bedtime PRN Insomnia

## 2021-11-27 NOTE — BH INPATIENT PSYCHIATRY PROGRESS NOTE - NSBHCHARTREVIEWVS_PSY_A_CORE FT
Vital Signs Last 24 Hrs  T(C): 36.7 (11-27-21 @ 06:34), Max: 36.7 (11-27-21 @ 06:34)  T(F): 98.1 (11-27-21 @ 06:34), Max: 98.1 (11-27-21 @ 06:34)  HR: --  BP: --  BP(mean): --  RR: --  SpO2: --    Orthostatic VS  11-26-21 @ 18:39  Lying BP: --/-- HR: --  Sitting BP: 128/82 HR: 90  Standing BP: 151/75 HR: 120  Site: --  Mode: --  Orthostatic VS  11-26-21 @ 08:09  Lying BP: --/-- HR: --  Sitting BP: 117/72 HR: 80  Standing BP: --/-- HR: --  Site: --  Mode: --  Orthostatic VS  11-25-21 @ 19:21  Lying BP: --/-- HR: --  Sitting BP: 128/73 HR: 103  Standing BP: 122/85 HR: 105  Site: --  Mode: --  Orthostatic VS  11-25-21 @ 08:06  Lying BP: --/-- HR: --  Sitting BP: 120/64 HR: 78  Standing BP: 120/69 HR: 78  Site: --  Mode: --   Vital Signs Last 24 Hrs  T(C): 36.7 (11-27-21 @ 06:34), Max: 36.7 (11-27-21 @ 06:34)  T(F): 98.1 (11-27-21 @ 06:34), Max: 98.1 (11-27-21 @ 06:34)  HR: --  BP: --  BP(mean): --  RR: --  SpO2: --    Orthostatic VS  11-27-21 @ 08:33  Lying BP: --/-- HR: --  Sitting BP: 119/57 HR: 84  Standing BP: 108/54 HR: 90  Site: --  Mode: electronic  Orthostatic VS  11-26-21 @ 18:39  Lying BP: --/-- HR: --  Sitting BP: 128/82 HR: 90  Standing BP: 151/75 HR: 120  Site: --  Mode: --  Orthostatic VS  11-26-21 @ 08:09  Lying BP: --/-- HR: --  Sitting BP: 117/72 HR: 80  Standing BP: --/-- HR: --  Site: --  Mode: --  Orthostatic VS  11-25-21 @ 19:21  Lying BP: --/-- HR: --  Sitting BP: 128/73 HR: 103  Standing BP: 122/85 HR: 105  Site: --  Mode: --

## 2021-11-27 NOTE — BH INPATIENT PSYCHIATRY PROGRESS NOTE - NSBHASSESSSUMMFT_PSY_ALL_CORE
Admitted for worsening depression after self presented to [facility] and sent for medical clearance to [facility].  Denies SI today but reports severe depression.      Plan:  1.	Legals:  2PC  2.	Safety: routine observation, denies SI/HI/I/P on the unit. PRNs: Ativan/Haldol/Benadryl PO/IM  3.	Psychiatry: Continue Risperdal and Zoloft. Increase Zoloft. Start CIWA with sx triggered Ativan in case of emergence of withdrawal.   4.	Group, milieu, individual therapy as appropriate.  5.	Medical: continue home medications  6.	Dispo: pending clinical improvement.  Patient continues to require inpatient hospitalization for stabilization and safety.     Admitted for worsening depression after self presented to [facility] and sent for medical clearance to [facility].  Denies SI today but reports severe depression.      Plan:  1.	Legals:  2PC  2.	Safety: routine observation, denies SI/HI/I/P on the unit. PRNs: Ativan/Haldol/Benadryl PO/IM  3.	Psychiatry: dc Risperdal start Abilify 5mg , and Zoloft.  Start CIWA with sx triggered Ativan in case of emergence of withdrawal.   4.	Group, milieu, individual therapy as appropriate.  5.	Medical: continue home medications  6.	Dispo: pending clinical improvement.  Patient continues to require inpatient hospitalization for stabilization and safety.

## 2021-11-28 PROCEDURE — 99232 SBSQ HOSP IP/OBS MODERATE 35: CPT

## 2021-11-28 RX ORDER — TRAZODONE HCL 50 MG
50 TABLET ORAL AT BEDTIME
Refills: 0 | Status: DISCONTINUED | OUTPATIENT
Start: 2021-11-28 | End: 2021-11-30

## 2021-11-28 RX ORDER — QUETIAPINE FUMARATE 200 MG/1
25 TABLET, FILM COATED ORAL EVERY 6 HOURS
Refills: 0 | Status: DISCONTINUED | OUTPATIENT
Start: 2021-11-28 | End: 2021-11-29

## 2021-11-28 RX ADMIN — Medication 4 MILLIGRAM(S): at 17:09

## 2021-11-28 RX ADMIN — Medication 4 MILLIGRAM(S): at 19:36

## 2021-11-28 RX ADMIN — Medication 650 MILLIGRAM(S): at 17:47

## 2021-11-28 RX ADMIN — QUETIAPINE FUMARATE 25 MILLIGRAM(S): 200 TABLET, FILM COATED ORAL at 10:06

## 2021-11-28 RX ADMIN — Medication 50 MILLIGRAM(S): at 17:47

## 2021-11-28 RX ADMIN — Medication 4 MILLIGRAM(S): at 15:30

## 2021-11-28 RX ADMIN — Medication 4 MILLIGRAM(S): at 10:06

## 2021-11-28 RX ADMIN — Medication 1 PATCH: at 08:09

## 2021-11-28 RX ADMIN — Medication 4 MILLIGRAM(S): at 08:10

## 2021-11-28 RX ADMIN — Medication 50 MILLIGRAM(S): at 14:03

## 2021-11-28 RX ADMIN — Medication 4 MILLIGRAM(S): at 13:18

## 2021-11-28 RX ADMIN — Medication 50 MILLIGRAM(S): at 09:19

## 2021-11-28 RX ADMIN — Medication 50 MILLIGRAM(S): at 19:57

## 2021-11-28 RX ADMIN — HALOPERIDOL DECANOATE 5 MILLIGRAM(S): 100 INJECTION INTRAMUSCULAR at 14:03

## 2021-11-28 RX ADMIN — Medication 2 MILLIGRAM(S): at 13:15

## 2021-11-28 RX ADMIN — Medication 650 MILLIGRAM(S): at 09:18

## 2021-11-28 RX ADMIN — Medication 1 PATCH: at 08:26

## 2021-11-28 RX ADMIN — ARIPIPRAZOLE 5 MILLIGRAM(S): 15 TABLET ORAL at 08:09

## 2021-11-28 RX ADMIN — SERTRALINE 100 MILLIGRAM(S): 25 TABLET, FILM COATED ORAL at 08:09

## 2021-11-28 RX ADMIN — Medication 2 MILLIGRAM(S): at 19:36

## 2021-11-28 NOTE — BH INPATIENT PSYCHIATRY PROGRESS NOTE - NSBHCHARTREVIEWVS_PSY_A_CORE FT
Vital Signs Last 24 Hrs  T(C): 36.5 (11-28-21 @ 06:46), Max: 36.8 (11-27-21 @ 19:35)  T(F): 97.7 (11-28-21 @ 06:46), Max: 98.3 (11-27-21 @ 19:35)  HR: --  BP: --  BP(mean): --  RR: --  SpO2: --    Orthostatic VS  11-27-21 @ 19:35  Lying BP: --/-- HR: --  Sitting BP: 130/74 HR: 98  Standing BP: 131/75 HR: 96  Site: --  Mode: --  Orthostatic VS  11-27-21 @ 08:33  Lying BP: --/-- HR: --  Sitting BP: 119/57 HR: 84  Standing BP: 108/54 HR: 90  Site: --  Mode: electronic  Orthostatic VS  11-26-21 @ 18:39  Lying BP: --/-- HR: --  Sitting BP: 128/82 HR: 90  Standing BP: 151/75 HR: 120  Site: --  Mode: --  Orthostatic VS  11-26-21 @ 08:09  Lying BP: --/-- HR: --  Sitting BP: 117/72 HR: 80  Standing BP: --/-- HR: --  Site: --  Mode: --   Vital Signs Last 24 Hrs  T(C): 36.5 (11-28-21 @ 06:46), Max: 36.8 (11-27-21 @ 19:35)  T(F): 97.7 (11-28-21 @ 06:46), Max: 98.3 (11-27-21 @ 19:35)  HR: 79 (11-28-21 @ 08:14) (79 - 79)  BP: 145/90 (11-28-21 @ 08:14) (145/90 - 145/90)  BP(mean): --  RR: --  SpO2: --    Orthostatic VS  11-27-21 @ 19:35  Lying BP: --/-- HR: --  Sitting BP: 130/74 HR: 98  Standing BP: 131/75 HR: 96  Site: --  Mode: --  Orthostatic VS  11-27-21 @ 08:33  Lying BP: --/-- HR: --  Sitting BP: 119/57 HR: 84  Standing BP: 108/54 HR: 90  Site: --  Mode: electronic  Orthostatic VS  11-26-21 @ 18:39  Lying BP: --/-- HR: --  Sitting BP: 128/82 HR: 90  Standing BP: 151/75 HR: 120  Site: --  Mode: --

## 2021-11-28 NOTE — BH INPATIENT PSYCHIATRY PROGRESS NOTE - CURRENT MEDICATION
MEDICATIONS  (STANDING):  ARIPiprazole 5 milliGRAM(s) Oral daily  nicotine - 21 mG/24Hr(s) Patch 1 patch Transdermal daily  sertraline 100 milliGRAM(s) Oral daily    MEDICATIONS  (PRN):  acetaminophen     Tablet .. 650 milliGRAM(s) Oral every 6 hours PRN Mild Pain (1 - 3), Moderate Pain (4 - 6)  diphenhydrAMINE 50 milliGRAM(s) Oral every 4 hours PRN agitation  diphenhydrAMINE Injectable 25 milliGRAM(s) IntraMuscular once PRN Agitation/EPS Prophylaxis  haloperidol     Tablet 5 milliGRAM(s) Oral every 6 hours PRN Agitation  haloperidol    Injectable 5 milliGRAM(s) IntraMuscular once PRN Agitation  hydrOXYzine hydrochloride 50 milliGRAM(s) Oral every 6 hours PRN anxiety  LORazepam     Tablet 2 milliGRAM(s) Oral every 6 hours PRN Severe agitation  LORazepam     Tablet 2 milliGRAM(s) Oral every 2 hours PRN CIWA score increase by 2 points and current CIWA score GREATER THAN 9  LORazepam   Injectable 2 milliGRAM(s) IntraMuscular every 2 hours PRN CIWA score increase by 2 points and current CIWA score GREATER THAN 9  LORazepam   Injectable 2 milliGRAM(s) IntraMuscular once PRN agitation  nicotine  Polacrilex Gum 4 milliGRAM(s) Oral every 2 hours PRN smoking cessation  traZODone 50 milliGRAM(s) Oral at bedtime PRN Insomnia   MEDICATIONS  (STANDING):  ARIPiprazole 5 milliGRAM(s) Oral daily  nicotine - 21 mG/24Hr(s) Patch 1 patch Transdermal daily  sertraline 100 milliGRAM(s) Oral daily    MEDICATIONS  (PRN):  acetaminophen     Tablet .. 650 milliGRAM(s) Oral every 6 hours PRN Mild Pain (1 - 3), Moderate Pain (4 - 6)  diphenhydrAMINE 50 milliGRAM(s) Oral every 4 hours PRN agitation  diphenhydrAMINE Injectable 25 milliGRAM(s) IntraMuscular once PRN Agitation/EPS Prophylaxis  haloperidol     Tablet 5 milliGRAM(s) Oral every 6 hours PRN Agitation  haloperidol    Injectable 5 milliGRAM(s) IntraMuscular once PRN Agitation  hydrOXYzine hydrochloride 50 milliGRAM(s) Oral every 6 hours PRN anxiety  LORazepam     Tablet 2 milliGRAM(s) Oral every 6 hours PRN Severe agitation  LORazepam     Tablet 2 milliGRAM(s) Oral every 2 hours PRN CIWA score increase by 2 points and current CIWA score GREATER THAN 9  LORazepam   Injectable 2 milliGRAM(s) IntraMuscular every 2 hours PRN CIWA score increase by 2 points and current CIWA score GREATER THAN 9  LORazepam   Injectable 2 milliGRAM(s) IntraMuscular once PRN agitation  nicotine  Polacrilex Gum 4 milliGRAM(s) Oral every 2 hours PRN smoking cessation  QUEtiapine 25 milliGRAM(s) Oral every 6 hours PRN anxiety  traZODone 50 milliGRAM(s) Oral at bedtime PRN Insomnia

## 2021-11-28 NOTE — BH INPATIENT PSYCHIATRY PROGRESS NOTE - NSBHASSESSSUMMFT_PSY_ALL_CORE
Admitted for worsening depression after self presented to [facility] and sent for medical clearance to [facility].  Denies SI today but reports severe depression.      Plan:  1.	Legals:  2PC  2.	Safety: routine observation, denies SI/HI/I/P on the unit. PRNs: Ativan/Haldol/Benadryl PO/IM  3.	Psychiatry: dc Risperdal start Abilify 5mg , and Zoloft.  Start CIWA with sx triggered Ativan in case of emergence of withdrawal.   4.	Group, milieu, individual therapy as appropriate.  5.	Medical: continue home medications  6.	Dispo: pending clinical improvement.  Patient continues to require inpatient hospitalization for stabilization and safety.     Admitted for worsening depression after self presented to [facility] and sent for medical clearance to [facility].  Denies SI today but reports severe depression.      Plan:  1.	Legals:  2PC  2.	Safety: routine observation, denies SI/HI/I/P on the unit. PRNs: Ativan/Haldol/Benadryl PO/IM  3.	Psychiatry: dc Risperdal start Abilify 5mg , and Zoloft.  Seroquel 25mg qprn. Start CIWA with sx triggered Ativan in case of emergence of withdrawal.   4.	Group, milieu, individual therapy as appropriate.  5.	Medical: continue home medications  6.	Dispo: pending clinical improvement.  Patient continues to require inpatient hospitalization for stabilization and safety.

## 2021-11-28 NOTE — BH INPATIENT PSYCHIATRY PROGRESS NOTE - PRN MEDS
MEDICATIONS  (PRN):  acetaminophen     Tablet .. 650 milliGRAM(s) Oral every 6 hours PRN Mild Pain (1 - 3), Moderate Pain (4 - 6)  diphenhydrAMINE 50 milliGRAM(s) Oral every 4 hours PRN agitation  diphenhydrAMINE Injectable 25 milliGRAM(s) IntraMuscular once PRN Agitation/EPS Prophylaxis  haloperidol     Tablet 5 milliGRAM(s) Oral every 6 hours PRN Agitation  haloperidol    Injectable 5 milliGRAM(s) IntraMuscular once PRN Agitation  hydrOXYzine hydrochloride 50 milliGRAM(s) Oral every 6 hours PRN anxiety  LORazepam     Tablet 2 milliGRAM(s) Oral every 6 hours PRN Severe agitation  LORazepam     Tablet 2 milliGRAM(s) Oral every 2 hours PRN CIWA score increase by 2 points and current CIWA score GREATER THAN 9  LORazepam   Injectable 2 milliGRAM(s) IntraMuscular every 2 hours PRN CIWA score increase by 2 points and current CIWA score GREATER THAN 9  LORazepam   Injectable 2 milliGRAM(s) IntraMuscular once PRN agitation  nicotine  Polacrilex Gum 4 milliGRAM(s) Oral every 2 hours PRN smoking cessation  traZODone 50 milliGRAM(s) Oral at bedtime PRN Insomnia   MEDICATIONS  (PRN):  acetaminophen     Tablet .. 650 milliGRAM(s) Oral every 6 hours PRN Mild Pain (1 - 3), Moderate Pain (4 - 6)  diphenhydrAMINE 50 milliGRAM(s) Oral every 4 hours PRN agitation  diphenhydrAMINE Injectable 25 milliGRAM(s) IntraMuscular once PRN Agitation/EPS Prophylaxis  haloperidol     Tablet 5 milliGRAM(s) Oral every 6 hours PRN Agitation  haloperidol    Injectable 5 milliGRAM(s) IntraMuscular once PRN Agitation  hydrOXYzine hydrochloride 50 milliGRAM(s) Oral every 6 hours PRN anxiety  LORazepam     Tablet 2 milliGRAM(s) Oral every 6 hours PRN Severe agitation  LORazepam     Tablet 2 milliGRAM(s) Oral every 2 hours PRN CIWA score increase by 2 points and current CIWA score GREATER THAN 9  LORazepam   Injectable 2 milliGRAM(s) IntraMuscular every 2 hours PRN CIWA score increase by 2 points and current CIWA score GREATER THAN 9  LORazepam   Injectable 2 milliGRAM(s) IntraMuscular once PRN agitation  nicotine  Polacrilex Gum 4 milliGRAM(s) Oral every 2 hours PRN smoking cessation  QUEtiapine 25 milliGRAM(s) Oral every 6 hours PRN anxiety  traZODone 50 milliGRAM(s) Oral at bedtime PRN Insomnia

## 2021-11-28 NOTE — BH INPATIENT PSYCHIATRY PROGRESS NOTE - NSBHMETABOLIC_PSY_ALL_CORE_FT
BMI: BMI (kg/m2): 28.4 (11-24-21 @ 22:41)  HbA1c: A1C with Estimated Average Glucose Result: 5.0 % (11-09-21 @ 11:08)    Glucose:   BP: --  Lipid Panel: Date/Time: 11-09-21 @ 11:08  Cholesterol, Serum: 208  Direct LDL: --  HDL Cholesterol, Serum: 42  Total Cholesterol/HDL Ration Measurement: --  Triglycerides, Serum: 579   BMI: BMI (kg/m2): 28.4 (11-24-21 @ 22:41)  HbA1c: A1C with Estimated Average Glucose Result: 5.0 % (11-09-21 @ 11:08)    Glucose:   BP: 145/90 (11-28-21 @ 08:14) (145/90 - 145/90)  Lipid Panel: Date/Time: 11-09-21 @ 11:08  Cholesterol, Serum: 208  Direct LDL: --  HDL Cholesterol, Serum: 42  Total Cholesterol/HDL Ration Measurement: --  Triglycerides, Serum: 579

## 2021-11-29 PROCEDURE — 99232 SBSQ HOSP IP/OBS MODERATE 35: CPT

## 2021-11-29 PROCEDURE — 90853 GROUP PSYCHOTHERAPY: CPT

## 2021-11-29 RX ORDER — BENZTROPINE MESYLATE 1 MG
0.5 TABLET ORAL
Refills: 0 | Status: DISCONTINUED | OUTPATIENT
Start: 2021-11-29 | End: 2021-11-30

## 2021-11-29 RX ORDER — HYDROXYZINE HCL 10 MG
75 TABLET ORAL EVERY 6 HOURS
Refills: 0 | Status: DISCONTINUED | OUTPATIENT
Start: 2021-11-29 | End: 2021-11-30

## 2021-11-29 RX ORDER — ARIPIPRAZOLE 15 MG/1
10 TABLET ORAL DAILY
Refills: 0 | Status: DISCONTINUED | OUTPATIENT
Start: 2021-11-29 | End: 2021-12-03

## 2021-11-29 RX ORDER — GABAPENTIN 400 MG/1
200 CAPSULE ORAL THREE TIMES A DAY
Refills: 0 | Status: DISCONTINUED | OUTPATIENT
Start: 2021-11-29 | End: 2021-11-30

## 2021-11-29 RX ADMIN — SERTRALINE 100 MILLIGRAM(S): 25 TABLET, FILM COATED ORAL at 08:02

## 2021-11-29 RX ADMIN — Medication 50 MILLIGRAM(S): at 19:50

## 2021-11-29 RX ADMIN — Medication 4 MILLIGRAM(S): at 09:53

## 2021-11-29 RX ADMIN — Medication 4 MILLIGRAM(S): at 12:25

## 2021-11-29 RX ADMIN — Medication 0.5 MILLIGRAM(S): at 19:50

## 2021-11-29 RX ADMIN — Medication 4 MILLIGRAM(S): at 19:50

## 2021-11-29 RX ADMIN — Medication 650 MILLIGRAM(S): at 11:45

## 2021-11-29 RX ADMIN — Medication 4 MILLIGRAM(S): at 17:10

## 2021-11-29 RX ADMIN — Medication 1 PATCH: at 08:53

## 2021-11-29 RX ADMIN — GABAPENTIN 200 MILLIGRAM(S): 400 CAPSULE ORAL at 14:00

## 2021-11-29 RX ADMIN — Medication 1 PATCH: at 08:01

## 2021-11-29 RX ADMIN — GABAPENTIN 200 MILLIGRAM(S): 400 CAPSULE ORAL at 19:50

## 2021-11-29 RX ADMIN — Medication 650 MILLIGRAM(S): at 18:28

## 2021-11-29 RX ADMIN — Medication 4 MILLIGRAM(S): at 14:28

## 2021-11-29 RX ADMIN — Medication 50 MILLIGRAM(S): at 10:17

## 2021-11-29 RX ADMIN — Medication 650 MILLIGRAM(S): at 10:17

## 2021-11-29 RX ADMIN — ARIPIPRAZOLE 5 MILLIGRAM(S): 15 TABLET ORAL at 08:02

## 2021-11-29 RX ADMIN — Medication 75 MILLIGRAM(S): at 15:51

## 2021-11-29 NOTE — BH INPATIENT PSYCHIATRY PROGRESS NOTE - CURRENT MEDICATION
MEDICATIONS  (STANDING):  ARIPiprazole 10 milliGRAM(s) Oral daily  benztropine 0.5 milliGRAM(s) Oral two times a day  gabapentin 200 milliGRAM(s) Oral three times a day  nicotine - 21 mG/24Hr(s) Patch 1 patch Transdermal daily  sertraline 100 milliGRAM(s) Oral daily  traZODone 50 milliGRAM(s) Oral at bedtime    MEDICATIONS  (PRN):  acetaminophen     Tablet .. 650 milliGRAM(s) Oral every 6 hours PRN Mild Pain (1 - 3), Moderate Pain (4 - 6)  diphenhydrAMINE 50 milliGRAM(s) Oral every 4 hours PRN agitation  diphenhydrAMINE Injectable 25 milliGRAM(s) IntraMuscular once PRN Agitation/EPS Prophylaxis  haloperidol     Tablet 5 milliGRAM(s) Oral every 6 hours PRN Agitation  haloperidol    Injectable 5 milliGRAM(s) IntraMuscular once PRN Agitation  hydrOXYzine hydrochloride 75 milliGRAM(s) Oral every 6 hours PRN anxiety  LORazepam     Tablet 2 milliGRAM(s) Oral every 6 hours PRN Severe agitation  LORazepam     Tablet 2 milliGRAM(s) Oral every 2 hours PRN CIWA score increase by 2 points and current CIWA score GREATER THAN 9  LORazepam   Injectable 2 milliGRAM(s) IntraMuscular every 2 hours PRN CIWA score increase by 2 points and current CIWA score GREATER THAN 9  LORazepam   Injectable 2 milliGRAM(s) IntraMuscular once PRN agitation  nicotine  Polacrilex Gum 4 milliGRAM(s) Oral every 2 hours PRN smoking cessation

## 2021-11-29 NOTE — BH INPATIENT PSYCHIATRY PROGRESS NOTE - NSBHMETABOLIC_PSY_ALL_CORE_FT
BMI: BMI (kg/m2): 28.4 (11-24-21 @ 22:41)  HbA1c: A1C with Estimated Average Glucose Result: 5.0 % (11-09-21 @ 11:08)    Glucose:   BP: 145/90 (11-28-21 @ 08:14) (145/90 - 145/90)  Lipid Panel: Date/Time: 11-09-21 @ 11:08  Cholesterol, Serum: 208  Direct LDL: --  HDL Cholesterol, Serum: 42  Total Cholesterol/HDL Ration Measurement: --  Triglycerides, Serum: 579

## 2021-11-29 NOTE — BH INPATIENT PSYCHIATRY PROGRESS NOTE - PRN MEDS
MEDICATIONS  (PRN):  acetaminophen     Tablet .. 650 milliGRAM(s) Oral every 6 hours PRN Mild Pain (1 - 3), Moderate Pain (4 - 6)  diphenhydrAMINE 50 milliGRAM(s) Oral every 4 hours PRN agitation  diphenhydrAMINE Injectable 25 milliGRAM(s) IntraMuscular once PRN Agitation/EPS Prophylaxis  haloperidol     Tablet 5 milliGRAM(s) Oral every 6 hours PRN Agitation  haloperidol    Injectable 5 milliGRAM(s) IntraMuscular once PRN Agitation  hydrOXYzine hydrochloride 75 milliGRAM(s) Oral every 6 hours PRN anxiety  LORazepam     Tablet 2 milliGRAM(s) Oral every 6 hours PRN Severe agitation  LORazepam     Tablet 2 milliGRAM(s) Oral every 2 hours PRN CIWA score increase by 2 points and current CIWA score GREATER THAN 9  LORazepam   Injectable 2 milliGRAM(s) IntraMuscular every 2 hours PRN CIWA score increase by 2 points and current CIWA score GREATER THAN 9  LORazepam   Injectable 2 milliGRAM(s) IntraMuscular once PRN agitation  nicotine  Polacrilex Gum 4 milliGRAM(s) Oral every 2 hours PRN smoking cessation

## 2021-11-29 NOTE — BH INPATIENT PSYCHIATRY PROGRESS NOTE - NSBHASSESSSUMMFT_PSY_ALL_CORE
Pt. reports improved mood, denies SI/i/p, however appears irritable, poor frustration tolerance. He later c/o anxiety, possibly akathisia. Cogentin initiated. Pt. agrees to titration of Abilify, gabapentin. Refused Vivitrol for reported hx of ETOH abuse.    Plan:  1.	Legals:  2PC  2.	Safety: routine observation, denies SI/HI/I/P on the unit. PRNs: Ativan/Haldol/Benadryl PO/IM  3.	Psychiatry: Abilify 10mg , Zoloft 100mg, gabapentin 200mg TID, cogentin 0.5mg BID; D/C CIWA -- pt scored less than 7 per protocol  4.	Group, milieu, individual therapy as appropriate.  5.	Medical: continue home medications  6.	Dispo: pending clinical improvement.  Patient continues to require inpatient hospitalization for stabilization and safety.

## 2021-11-29 NOTE — BH INPATIENT PSYCHIATRY PROGRESS NOTE - NSBHCHARTREVIEWVS_PSY_A_CORE FT
Vital Signs Last 24 Hrs  T(C): 37.2 (11-29-21 @ 14:49), Max: 37.2 (11-29-21 @ 14:49)  T(F): 98.9 (11-29-21 @ 14:49), Max: 98.9 (11-29-21 @ 14:49)  HR: --  BP: --  BP(mean): --  RR: 18 (11-29-21 @ 08:10) (18 - 18)  SpO2: --    Orthostatic VS  11-29-21 @ 08:10  Lying BP: --/-- HR: --  Sitting BP: 141/75 HR: 90  Standing BP: 130/76 HR: 96  Site: --  Mode: --  Orthostatic VS  11-28-21 @ 19:06  Lying BP: --/-- HR: --  Sitting BP: 126/72 HR: 84  Standing BP: 121/81 HR: 90  Site: --  Mode: --  Orthostatic VS  11-27-21 @ 19:35  Lying BP: --/-- HR: --  Sitting BP: 130/74 HR: 98  Standing BP: 131/75 HR: 96  Site: --  Mode: --

## 2021-11-30 PROCEDURE — 99232 SBSQ HOSP IP/OBS MODERATE 35: CPT

## 2021-11-30 RX ORDER — BENZTROPINE MESYLATE 1 MG
0.5 TABLET ORAL DAILY
Refills: 0 | Status: DISCONTINUED | OUTPATIENT
Start: 2021-11-30 | End: 2021-12-03

## 2021-11-30 RX ORDER — BENZTROPINE MESYLATE 1 MG
1 TABLET ORAL AT BEDTIME
Refills: 0 | Status: DISCONTINUED | OUTPATIENT
Start: 2021-11-30 | End: 2021-12-03

## 2021-11-30 RX ORDER — GABAPENTIN 400 MG/1
400 CAPSULE ORAL THREE TIMES A DAY
Refills: 0 | Status: DISCONTINUED | OUTPATIENT
Start: 2021-11-30 | End: 2021-12-03

## 2021-11-30 RX ORDER — QUETIAPINE FUMARATE 200 MG/1
50 TABLET, FILM COATED ORAL EVERY 4 HOURS
Refills: 0 | Status: DISCONTINUED | OUTPATIENT
Start: 2021-11-30 | End: 2021-12-03

## 2021-11-30 RX ORDER — LANOLIN ALCOHOL/MO/W.PET/CERES
5 CREAM (GRAM) TOPICAL AT BEDTIME
Refills: 0 | Status: DISCONTINUED | OUTPATIENT
Start: 2021-11-30 | End: 2021-12-03

## 2021-11-30 RX ADMIN — GABAPENTIN 200 MILLIGRAM(S): 400 CAPSULE ORAL at 08:39

## 2021-11-30 RX ADMIN — Medication 1 PATCH: at 08:39

## 2021-11-30 RX ADMIN — GABAPENTIN 400 MILLIGRAM(S): 400 CAPSULE ORAL at 12:17

## 2021-11-30 RX ADMIN — QUETIAPINE FUMARATE 50 MILLIGRAM(S): 200 TABLET, FILM COATED ORAL at 16:59

## 2021-11-30 RX ADMIN — Medication 75 MILLIGRAM(S): at 05:28

## 2021-11-30 RX ADMIN — HALOPERIDOL DECANOATE 5 MILLIGRAM(S): 100 INJECTION INTRAMUSCULAR at 18:26

## 2021-11-30 RX ADMIN — GABAPENTIN 400 MILLIGRAM(S): 400 CAPSULE ORAL at 23:02

## 2021-11-30 RX ADMIN — Medication 4 MILLIGRAM(S): at 07:18

## 2021-11-30 RX ADMIN — Medication 4 MILLIGRAM(S): at 09:39

## 2021-11-30 RX ADMIN — Medication 2 MILLIGRAM(S): at 17:54

## 2021-11-30 RX ADMIN — Medication 4 MILLIGRAM(S): at 05:33

## 2021-11-30 RX ADMIN — Medication 650 MILLIGRAM(S): at 23:02

## 2021-11-30 RX ADMIN — Medication 4 MILLIGRAM(S): at 15:35

## 2021-11-30 RX ADMIN — QUETIAPINE FUMARATE 50 MILLIGRAM(S): 200 TABLET, FILM COATED ORAL at 12:17

## 2021-11-30 RX ADMIN — SERTRALINE 100 MILLIGRAM(S): 25 TABLET, FILM COATED ORAL at 08:38

## 2021-11-30 RX ADMIN — Medication 0.5 MILLIGRAM(S): at 08:38

## 2021-11-30 RX ADMIN — Medication 50 MILLIGRAM(S): at 18:26

## 2021-11-30 RX ADMIN — Medication 650 MILLIGRAM(S): at 16:59

## 2021-11-30 RX ADMIN — Medication 5 MILLIGRAM(S): at 23:01

## 2021-11-30 RX ADMIN — ARIPIPRAZOLE 10 MILLIGRAM(S): 15 TABLET ORAL at 08:39

## 2021-11-30 RX ADMIN — Medication 2 MILLIGRAM(S): at 17:53

## 2021-11-30 RX ADMIN — Medication 4 MILLIGRAM(S): at 17:54

## 2021-11-30 RX ADMIN — Medication 650 MILLIGRAM(S): at 05:28

## 2021-11-30 RX ADMIN — Medication 1 MILLIGRAM(S): at 23:02

## 2021-11-30 NOTE — BH INPATIENT PSYCHIATRY PROGRESS NOTE - NSBHASSESSSUMMFT_PSY_ALL_CORE
Pt reports improved mood, denies SI/i/p, however, appears irritable, poor frustration tolerance, discharge focused. Continues to c/o anxiety, gabapentin and cogentin titrated; refusing Zoloft titration. Refused Vivitrol for reported hx of ETOH abuse. Seroquel initiated for sleep.    Plan:  1.	Legals:  2PC  2.	Safety: routine observation, denies SI/HI/I/P on the unit. PRNs: Ativan/Haldol/Benadryl PO/IM  3.	Psychiatry: Abilify 10mg , Zoloft 100mg, gabapentin 400mg TID, cogentin 0.5mg AM, 1mg HS, Seroquel 50mg HS; D/C CIWA -- pt scored less than 7 per protocol  4.	Group, milieu, individual therapy as appropriate.  5.	Medical: continue home medications  6.	Dispo: pending clinical improvement.  Patient continues to require inpatient hospitalization for stabilization and safety.

## 2021-11-30 NOTE — BH INPATIENT PSYCHIATRY PROGRESS NOTE - PRN MEDS
MEDICATIONS  (PRN):  acetaminophen     Tablet .. 650 milliGRAM(s) Oral every 6 hours PRN Mild Pain (1 - 3), Moderate Pain (4 - 6)  diphenhydrAMINE 50 milliGRAM(s) Oral every 4 hours PRN agitation  diphenhydrAMINE Injectable 25 milliGRAM(s) IntraMuscular once PRN Agitation/EPS Prophylaxis  haloperidol     Tablet 5 milliGRAM(s) Oral every 6 hours PRN Agitation  haloperidol    Injectable 5 milliGRAM(s) IntraMuscular once PRN Agitation  LORazepam     Tablet 2 milliGRAM(s) Oral every 6 hours PRN Severe agitation  LORazepam   Injectable 2 milliGRAM(s) IntraMuscular once PRN agitation  nicotine  Polacrilex Gum 4 milliGRAM(s) Oral every 2 hours PRN smoking cessation  QUEtiapine 50 milliGRAM(s) Oral every 4 hours PRN anxiety/insomnia

## 2021-11-30 NOTE — BH INPATIENT PSYCHIATRY PROGRESS NOTE - NSBHCHARTREVIEWVS_PSY_A_CORE FT
Vital Signs Last 24 Hrs  T(C): 36.4 (11-30-21 @ 19:15), Max: 36.5 (11-30-21 @ 14:48)  T(F): 97.6 (11-30-21 @ 19:15), Max: 97.7 (11-30-21 @ 14:48)  HR: --  BP: --  BP(mean): --  RR: --  SpO2: --    Orthostatic VS  11-30-21 @ 19:15  Lying BP: --/-- HR: --  Sitting BP: 145/80 HR: 89  Standing BP: 132/73 HR: 97  Site: --  Mode: --  Orthostatic VS  11-30-21 @ 05:54  Lying BP: --/-- HR: --  Sitting BP: 127/72 HR: 76  Standing BP: 119/71 HR: 81  Site: --  Mode: --  Orthostatic VS  11-29-21 @ 19:49  Lying BP: --/-- HR: --  Sitting BP: 136/89 HR: 87  Standing BP: 136/83 HR: 90  Site: upper left arm  Mode: electronic  Orthostatic VS  11-29-21 @ 08:10  Lying BP: --/-- HR: --  Sitting BP: 141/75 HR: 90  Standing BP: 130/76 HR: 96  Site: --  Mode: --

## 2021-11-30 NOTE — BH INPATIENT PSYCHIATRY PROGRESS NOTE - CURRENT MEDICATION
MEDICATIONS  (STANDING):  ARIPiprazole 10 milliGRAM(s) Oral daily  benztropine 1 milliGRAM(s) Oral at bedtime  benztropine 0.5 milliGRAM(s) Oral daily  gabapentin 400 milliGRAM(s) Oral three times a day  melatonin. 5 milliGRAM(s) Oral at bedtime  nicotine - 21 mG/24Hr(s) Patch 1 patch Transdermal daily  sertraline 100 milliGRAM(s) Oral daily    MEDICATIONS  (PRN):  acetaminophen     Tablet .. 650 milliGRAM(s) Oral every 6 hours PRN Mild Pain (1 - 3), Moderate Pain (4 - 6)  diphenhydrAMINE 50 milliGRAM(s) Oral every 4 hours PRN agitation  diphenhydrAMINE Injectable 25 milliGRAM(s) IntraMuscular once PRN Agitation/EPS Prophylaxis  haloperidol     Tablet 5 milliGRAM(s) Oral every 6 hours PRN Agitation  haloperidol    Injectable 5 milliGRAM(s) IntraMuscular once PRN Agitation  LORazepam     Tablet 2 milliGRAM(s) Oral every 6 hours PRN Severe agitation  LORazepam   Injectable 2 milliGRAM(s) IntraMuscular once PRN agitation  nicotine  Polacrilex Gum 4 milliGRAM(s) Oral every 2 hours PRN smoking cessation  QUEtiapine 50 milliGRAM(s) Oral every 4 hours PRN anxiety/insomnia

## 2021-12-01 PROCEDURE — 99231 SBSQ HOSP IP/OBS SF/LOW 25: CPT

## 2021-12-01 RX ORDER — SERTRALINE 25 MG/1
150 TABLET, FILM COATED ORAL DAILY
Refills: 0 | Status: DISCONTINUED | OUTPATIENT
Start: 2021-12-01 | End: 2021-12-03

## 2021-12-01 RX ADMIN — Medication 5 MILLIGRAM(S): at 21:03

## 2021-12-01 RX ADMIN — GABAPENTIN 400 MILLIGRAM(S): 400 CAPSULE ORAL at 12:34

## 2021-12-01 RX ADMIN — Medication 4 MILLIGRAM(S): at 08:18

## 2021-12-01 RX ADMIN — Medication 2 MILLIGRAM(S): at 12:34

## 2021-12-01 RX ADMIN — GABAPENTIN 400 MILLIGRAM(S): 400 CAPSULE ORAL at 21:03

## 2021-12-01 RX ADMIN — Medication 1 PATCH: at 08:18

## 2021-12-01 RX ADMIN — ARIPIPRAZOLE 10 MILLIGRAM(S): 15 TABLET ORAL at 08:17

## 2021-12-01 RX ADMIN — SERTRALINE 100 MILLIGRAM(S): 25 TABLET, FILM COATED ORAL at 08:18

## 2021-12-01 RX ADMIN — Medication 1 MILLIGRAM(S): at 21:03

## 2021-12-01 RX ADMIN — Medication 4 MILLIGRAM(S): at 16:17

## 2021-12-01 RX ADMIN — Medication 4 MILLIGRAM(S): at 12:34

## 2021-12-01 RX ADMIN — Medication 4 MILLIGRAM(S): at 04:28

## 2021-12-01 RX ADMIN — GABAPENTIN 400 MILLIGRAM(S): 400 CAPSULE ORAL at 08:18

## 2021-12-01 RX ADMIN — Medication 0.5 MILLIGRAM(S): at 08:17

## 2021-12-01 NOTE — BH INPATIENT PSYCHIATRY PROGRESS NOTE - NSBHCHARTREVIEWVS_PSY_A_CORE FT
Vital Signs Last 24 Hrs  T(C): 35.9 (12-01-21 @ 14:25), Max: 36.4 (11-30-21 @ 19:15)  T(F): 96.6 (12-01-21 @ 14:25), Max: 97.6 (11-30-21 @ 19:15)  HR: --  BP: --  BP(mean): --  RR: --  SpO2: --    Orthostatic VS  12-01-21 @ 05:54  Lying BP: --/-- HR: --  Sitting BP: 125/72 HR: 73  Standing BP: 103/60 HR: 80  Site: --  Mode: --  Orthostatic VS  11-30-21 @ 19:15  Lying BP: --/-- HR: --  Sitting BP: 145/80 HR: 89  Standing BP: 132/73 HR: 97  Site: --  Mode: --  Orthostatic VS  11-30-21 @ 05:54  Lying BP: --/-- HR: --  Sitting BP: 127/72 HR: 76  Standing BP: 119/71 HR: 81  Site: --  Mode: --  Orthostatic VS  11-29-21 @ 19:49  Lying BP: --/-- HR: --  Sitting BP: 136/89 HR: 87  Standing BP: 136/83 HR: 90  Site: upper left arm  Mode: electronic

## 2021-12-01 NOTE — BH INPATIENT PSYCHIATRY PROGRESS NOTE - PRN MEDS
MEDICATIONS  (PRN):  acetaminophen     Tablet .. 650 milliGRAM(s) Oral every 6 hours PRN Mild Pain (1 - 3), Moderate Pain (4 - 6)  diphenhydrAMINE 50 milliGRAM(s) Oral every 4 hours PRN agitation  diphenhydrAMINE Injectable 25 milliGRAM(s) IntraMuscular once PRN Agitation/EPS Prophylaxis  haloperidol    Injectable 5 milliGRAM(s) IntraMuscular once PRN Agitation  LORazepam     Tablet 2 milliGRAM(s) Oral every 6 hours PRN Severe agitation  LORazepam   Injectable 2 milliGRAM(s) IntraMuscular once PRN agitation  nicotine  Polacrilex Gum 4 milliGRAM(s) Oral every 2 hours PRN smoking cessation  QUEtiapine 50 milliGRAM(s) Oral every 4 hours PRN anxiety/insomnia

## 2021-12-01 NOTE — BH INPATIENT PSYCHIATRY PROGRESS NOTE - CURRENT MEDICATION
MEDICATIONS  (STANDING):  ARIPiprazole 10 milliGRAM(s) Oral daily  benztropine 1 milliGRAM(s) Oral at bedtime  benztropine 0.5 milliGRAM(s) Oral daily  gabapentin 400 milliGRAM(s) Oral three times a day  melatonin. 5 milliGRAM(s) Oral at bedtime  nicotine - 21 mG/24Hr(s) Patch 1 patch Transdermal daily  sertraline 150 milliGRAM(s) Oral daily    MEDICATIONS  (PRN):  acetaminophen     Tablet .. 650 milliGRAM(s) Oral every 6 hours PRN Mild Pain (1 - 3), Moderate Pain (4 - 6)  diphenhydrAMINE 50 milliGRAM(s) Oral every 4 hours PRN agitation  diphenhydrAMINE Injectable 25 milliGRAM(s) IntraMuscular once PRN Agitation/EPS Prophylaxis  haloperidol    Injectable 5 milliGRAM(s) IntraMuscular once PRN Agitation  LORazepam     Tablet 2 milliGRAM(s) Oral every 6 hours PRN Severe agitation  LORazepam   Injectable 2 milliGRAM(s) IntraMuscular once PRN agitation  nicotine  Polacrilex Gum 4 milliGRAM(s) Oral every 2 hours PRN smoking cessation  QUEtiapine 50 milliGRAM(s) Oral every 4 hours PRN anxiety/insomnia

## 2021-12-01 NOTE — BH INPATIENT PSYCHIATRY PROGRESS NOTE - NSBHASSESSSUMMFT_PSY_ALL_CORE
Pt reports improved mood, denies SI/i/p, and appears significantly less anxious and irritable than yesterday which could be secondary to PRN medication administration from yesterday evening. However, pt was significantly anxious and restless with racing thoughts yesterday evening requiring PRN Ativan, Haldol, Seroquel x2, Benadryl; however, now improved comparatively. Pt continues to be discharge focused. Pt endorsed improved neck and head pain.     Plan  1.	Legals: 2PC  2.	Safety: routine observation, denies SI/H/I/P on the unit. PRNs: Ativan 2mg, /Benadryl 50mg PO/IM and Seroquel 50mg.   3.	Psychiatry: Abilify 10mg, Zoloft 150mg, gabapentin 400mg TID, Cogentin 0.5mg AM and 1mg PM; D/C CIWA – pt scored less than 7 per protocol.   4.	Group, milieu, individual therapy as appropriate.   5.	Medical: continue with home medications  6.	Dispo: pending clinical improvement. Pt continues to require inpatient hospitalization for stabilization and safety.

## 2021-12-02 PROCEDURE — 99231 SBSQ HOSP IP/OBS SF/LOW 25: CPT

## 2021-12-02 RX ORDER — QUETIAPINE FUMARATE 200 MG/1
1 TABLET, FILM COATED ORAL
Qty: 56 | Refills: 0
Start: 2021-12-02 | End: 2021-12-15

## 2021-12-02 RX ORDER — ARIPIPRAZOLE 15 MG/1
1 TABLET ORAL
Qty: 14 | Refills: 0
Start: 2021-12-02 | End: 2021-12-15

## 2021-12-02 RX ORDER — SERTRALINE 25 MG/1
3 TABLET, FILM COATED ORAL
Qty: 42 | Refills: 0
Start: 2021-12-02 | End: 2021-12-15

## 2021-12-02 RX ORDER — LANOLIN ALCOHOL/MO/W.PET/CERES
1 CREAM (GRAM) TOPICAL
Qty: 14 | Refills: 0
Start: 2021-12-02 | End: 2021-12-15

## 2021-12-02 RX ORDER — BENZTROPINE MESYLATE 1 MG
1 TABLET ORAL
Qty: 14 | Refills: 0
Start: 2021-12-02 | End: 2021-12-15

## 2021-12-02 RX ORDER — NICOTINE POLACRILEX 2 MG
1 GUM BUCCAL
Qty: 168 | Refills: 0
Start: 2021-12-02 | End: 2021-12-15

## 2021-12-02 RX ORDER — GABAPENTIN 400 MG/1
1 CAPSULE ORAL
Qty: 42 | Refills: 0
Start: 2021-12-02 | End: 2021-12-15

## 2021-12-02 RX ORDER — NICOTINE POLACRILEX 2 MG
1 GUM BUCCAL
Qty: 14 | Refills: 0
Start: 2021-12-02 | End: 2021-12-15

## 2021-12-02 RX ADMIN — Medication 4 MILLIGRAM(S): at 19:56

## 2021-12-02 RX ADMIN — SERTRALINE 150 MILLIGRAM(S): 25 TABLET, FILM COATED ORAL at 08:48

## 2021-12-02 RX ADMIN — QUETIAPINE FUMARATE 50 MILLIGRAM(S): 200 TABLET, FILM COATED ORAL at 00:37

## 2021-12-02 RX ADMIN — Medication 5 MILLIGRAM(S): at 21:06

## 2021-12-02 RX ADMIN — Medication 4 MILLIGRAM(S): at 15:53

## 2021-12-02 RX ADMIN — QUETIAPINE FUMARATE 50 MILLIGRAM(S): 200 TABLET, FILM COATED ORAL at 12:06

## 2021-12-02 RX ADMIN — Medication 4 MILLIGRAM(S): at 00:36

## 2021-12-02 RX ADMIN — GABAPENTIN 400 MILLIGRAM(S): 400 CAPSULE ORAL at 08:48

## 2021-12-02 RX ADMIN — GABAPENTIN 400 MILLIGRAM(S): 400 CAPSULE ORAL at 19:56

## 2021-12-02 RX ADMIN — Medication 1 PATCH: at 09:57

## 2021-12-02 RX ADMIN — Medication 0.5 MILLIGRAM(S): at 08:47

## 2021-12-02 RX ADMIN — Medication 1 PATCH: at 08:48

## 2021-12-02 RX ADMIN — QUETIAPINE FUMARATE 50 MILLIGRAM(S): 200 TABLET, FILM COATED ORAL at 21:06

## 2021-12-02 RX ADMIN — Medication 4 MILLIGRAM(S): at 12:07

## 2021-12-02 RX ADMIN — ARIPIPRAZOLE 10 MILLIGRAM(S): 15 TABLET ORAL at 08:47

## 2021-12-02 RX ADMIN — Medication 650 MILLIGRAM(S): at 17:13

## 2021-12-02 RX ADMIN — Medication 4 MILLIGRAM(S): at 05:35

## 2021-12-02 RX ADMIN — GABAPENTIN 400 MILLIGRAM(S): 400 CAPSULE ORAL at 13:20

## 2021-12-02 RX ADMIN — Medication 50 MILLIGRAM(S): at 00:37

## 2021-12-02 RX ADMIN — Medication 50 MILLIGRAM(S): at 13:19

## 2021-12-02 RX ADMIN — Medication 4 MILLIGRAM(S): at 08:47

## 2021-12-02 RX ADMIN — Medication 1 MILLIGRAM(S): at 21:06

## 2021-12-02 NOTE — BH DISCHARGE NOTE NURSING/SOCIAL WORK/PSYCH REHAB - NSCDUDCCRISIS_PSY_A_CORE
[facility]  1 [phone] [facility] ([phone])  Text "WELL" to [phone]  Website: www.[employer]/.[facility] 1 [phone] 271-TSRE (8875) Website: www.safehorizon.org/.National Suicide Prevention Lifeline 0 (433) 385-5122/.  [employer]  1 (182) LIFEN[facility] (731-2869)/.  [facility]  [address] [place] [address] [phone]   Hours:  Monday through Friday from 9 AM to 3 PM/.  [employer]  [employer] - [facility] Crisis Line  [phone], Option 1

## 2021-12-02 NOTE — BH INPATIENT PSYCHIATRY PROGRESS NOTE - PRN MEDS
MEDICATIONS  (PRN):  acetaminophen     Tablet .. 650 milliGRAM(s) Oral every 6 hours PRN Mild Pain (1 - 3), Moderate Pain (4 - 6)  diphenhydrAMINE 50 milliGRAM(s) Oral every 4 hours PRN agitation  diphenhydrAMINE Injectable 25 milliGRAM(s) IntraMuscular once PRN Agitation/EPS Prophylaxis  haloperidol    Injectable 5 milliGRAM(s) IntraMuscular once PRN Agitation  LORazepam   Injectable 2 milliGRAM(s) IntraMuscular once PRN agitation  nicotine  Polacrilex Gum 4 milliGRAM(s) Oral every 2 hours PRN smoking cessation  QUEtiapine 50 milliGRAM(s) Oral every 4 hours PRN anxiety/insomnia

## 2021-12-02 NOTE — BH DISCHARGE NOTE NURSING/SOCIAL WORK/PSYCH REHAB - NSDCPRGOAL_PSY_ALL_CORE
Pt made some progress towards his discharge. Pt has verbalized his coping skills such as talk to others, reading, journaling, talk on the phone, attend 12 step meeting, watch TV and listen to music to manage symptoms. Pt endorsed anxiety towards his discharge. Pt currently denies SI, HI, AH, and VH. Pt was observed to be less irritable, however, pt remains to be anxious.  Pt has verbalized a return to 12 step meeting. Pt stated he was recently return to 12 step meeting through Zoom and willing to continue to participate post-discharge. Pt has verbalized an interest to work on vocational goal. Pt refused [facility] information and referral at this time.   During this hospitalization, pt showed 90% of group attendance. During the group session, pt was able to tolerate group structure, actively participated with relevant feedback. Pt was visible on the unit, interacts with selective pees.  Pt showed fair ADLs. Pt has participated in his safety plan. Pt was provided with [employer] survey.

## 2021-12-02 NOTE — BH INPATIENT PSYCHIATRY PROGRESS NOTE - NSBHASSESSSUMMFT_PSY_ALL_CORE
Pt is a 47 y/o male, with a PMH of anxiety, depression, polysubstance use and GERD, who presented to the ED voluntarily for suicidal ideation 11/24, now admitted to inpatient psychiatry for management of suicidal ideation.    Pt reports improved mood, denies SI/i/p, and appears mildly anxious but not irritable. However, pt was anxious and restless with racing thoughts last night requiring PRN Seroquel, and Benadryl; however, now improved comparatively. Pt continues to be discharge focused. Pt endorsed improved neck and head pain.    Plan    1. Legals: 2PC    2. Safety: routine observation, denies SI/H/I/P on the unit. PRNs: Ativan 2mg /Benadryl 50mg PO/IM and Seroquel 50mg.    3. Psychiatry: Abilify 10mg, Zoloft 150mg, gabapentin 400mg TID, Cogentin 0.5mg AM and 1mg PM; D/C CIWA – pt scored less than 7 per protocol.    4. Group, milieu, individual therapy as appropriate.    5. Medical: continue with home medications    6. Dispo: discharge home tomorrow AM. Pt continues to require inpatient hospitalization for stabilization and safety.

## 2021-12-02 NOTE — BH DISCHARGE NOTE NURSING/SOCIAL WORK/PSYCH REHAB - NSBHDCADDR1FT_A_CORE
[address] [place] [address] [id number] [place] [address], [facility] 3rd Floor Room [address] [place] [id number]

## 2021-12-02 NOTE — BH PSYCHOLOGY - GROUP THERAPY NOTE - NSBHPSYCHOLPARTICIPCOMMENT_PSY_A_CORE FT
Pt was appropriately groomed and fully engaged. During check in, pt shared that he was proud of his ability to step back and look at the bigger picture. Pt received support from other group members and also initiated a conversation about the how being hyper focused on a small problem can have significant impact. He talked about generational differences, difficulties with conflicts etc which resonated with other group members. His thought process was sometimes scattered, Oriented X3, speech was wnl, appropriate with others.
Pt was appropriately groomed and fully engaged. Pt shared his proudest moment to be graduating from high school. He talked about the specific qualities that he liked about himself and shared his struggles with his symptoms that get in the way of him being productive. Pt received support from the group. His thought process was linear and goal oriented. Oriented X3, speech was wnl, appropriate with others.

## 2021-12-02 NOTE — BH DISCHARGE NOTE NURSING/SOCIAL WORK/PSYCH REHAB - PATIENT PORTAL LINK FT
You can access the FollowMyHealth Patient Portal offered by [facility] by registering at the following website: [email or web address]. By joining [employer]’s FollowMyHealth portal, you will also be able to view your health information using other applications (apps) compatible with our system.

## 2021-12-02 NOTE — BH PSYCHOLOGY - GROUP THERAPY NOTE - NSPSYCHOLGRPCOGPT_PSY_A_CORE FT
Pt attended Cognitive Behavioral Therapy group. ACT skills and concepts were also utilized. The group started with a brief check in during which pts shared one thing about themselves that they are proud of. Then the group read a mindfulness metaphor and discussed the concept of radical acceptance. Pts had insightful comments which led to meaningful discussions around taking actions, being more present and having cognitive flexibility for new ideas. Group leaders explained concepts, reinforced participation, and engaged patients in the discussion.
focused on an experiential mindfulness activity guided by the 5-4-3-2-1 activity in which pts had to be mindful using all of their five senses. Pts were engaged in the activity and discussed their experience with the activity. The  explained concepts, reinforced participation, and engaged patients in the discussion

## 2021-12-02 NOTE — BH INPATIENT PSYCHIATRY PROGRESS NOTE - NSBHCHARTREVIEWVS_PSY_A_CORE FT
Vital Signs Last 24 Hrs  T(C): 36.7 (12-02-21 @ 14:45), Max: 36.8 (12-01-21 @ 19:35)  T(F): 98.1 (12-02-21 @ 14:45), Max: 98.2 (12-01-21 @ 19:35)  HR: --  BP: --  BP(mean): --  RR: --  SpO2: --    Orthostatic VS  12-02-21 @ 06:24  Lying BP: --/-- HR: --  Sitting BP: 126/74 HR: 70  Standing BP: 119/69 HR: 80  Site: --  Mode: --  Orthostatic VS  12-01-21 @ 19:35  Lying BP: --/-- HR: --  Sitting BP: 118/72 HR: 84  Standing BP: 112/84 HR: 72  Site: --  Mode: --  Orthostatic VS  12-01-21 @ 05:54  Lying BP: --/-- HR: --  Sitting BP: 125/72 HR: 73  Standing BP: 103/60 HR: 80  Site: --  Mode: --  Orthostatic VS  11-30-21 @ 19:15  Lying BP: --/-- HR: --  Sitting BP: 145/80 HR: 89  Standing BP: 132/73 HR: 97  Site: --  Mode: --

## 2021-12-02 NOTE — BH INPATIENT PSYCHIATRY PROGRESS NOTE - CURRENT MEDICATION
MEDICATIONS  (STANDING):  ARIPiprazole 10 milliGRAM(s) Oral daily  benztropine 1 milliGRAM(s) Oral at bedtime  benztropine 0.5 milliGRAM(s) Oral daily  gabapentin 400 milliGRAM(s) Oral three times a day  melatonin. 5 milliGRAM(s) Oral at bedtime  nicotine - 21 mG/24Hr(s) Patch 1 patch Transdermal daily  sertraline 150 milliGRAM(s) Oral daily    MEDICATIONS  (PRN):  acetaminophen     Tablet .. 650 milliGRAM(s) Oral every 6 hours PRN Mild Pain (1 - 3), Moderate Pain (4 - 6)  diphenhydrAMINE 50 milliGRAM(s) Oral every 4 hours PRN agitation  diphenhydrAMINE Injectable 25 milliGRAM(s) IntraMuscular once PRN Agitation/EPS Prophylaxis  haloperidol    Injectable 5 milliGRAM(s) IntraMuscular once PRN Agitation  LORazepam   Injectable 2 milliGRAM(s) IntraMuscular once PRN agitation  nicotine  Polacrilex Gum 4 milliGRAM(s) Oral every 2 hours PRN smoking cessation  QUEtiapine 50 milliGRAM(s) Oral every 4 hours PRN anxiety/insomnia

## 2021-12-02 NOTE — BH DISCHARGE NOTE NURSING/SOCIAL WORK/PSYCH REHAB - DISCHARGE INSTRUCTIONS AFTERCARE APPOINTMENTS
In order to check the location, date, or time of your aftercare appointment, please refer to your Discharge Instructions Document given to you upon leaving the hospital.  If you have lost the instructions please call [phone]

## 2021-12-03 VITALS — TEMPERATURE: 99 F

## 2021-12-03 PROCEDURE — 99238 HOSP IP/OBS DSCHRG MGMT 30/<: CPT

## 2021-12-03 RX ADMIN — ARIPIPRAZOLE 10 MILLIGRAM(S): 15 TABLET ORAL at 08:40

## 2021-12-03 RX ADMIN — Medication 4 MILLIGRAM(S): at 02:51

## 2021-12-03 RX ADMIN — GABAPENTIN 400 MILLIGRAM(S): 400 CAPSULE ORAL at 08:40

## 2021-12-03 RX ADMIN — Medication 0.5 MILLIGRAM(S): at 08:40

## 2021-12-03 RX ADMIN — Medication 1 PATCH: at 08:40

## 2021-12-03 RX ADMIN — SERTRALINE 150 MILLIGRAM(S): 25 TABLET, FILM COATED ORAL at 08:39

## 2021-12-03 RX ADMIN — Medication 4 MILLIGRAM(S): at 08:40

## 2021-12-03 NOTE — BH INPATIENT PSYCHIATRY PROGRESS NOTE - MSE OPTIONS
Unstructured MSE
Structured MSE

## 2021-12-03 NOTE — BH INPATIENT PSYCHIATRY DISCHARGE NOTE - NSDCMRMEDTOKEN_GEN_ALL_CORE_FT
ARIPiprazole 10 mg oral tablet: 1 tab(s) orally once a day  benztropine 0.5 mg oral tablet: 1 tab(s) orally once a day  benztropine 1 mg oral tablet: 1 tab(s) orally once a day (at bedtime)  gabapentin 400 mg oral capsule: 1 cap(s) orally 3 times a day  melatonin 5 mg oral tablet: 1 tab(s) orally once a day (at bedtime)   nicotine 2 mg oral transmucosal gum: 1 gum oral transmucosal every 2 hours  nicotine 21 mg/24 hr transdermal film, extended release: 1 patch transdermal once a day   QUEtiapine 50 mg oral tablet: 1 tab(s) orally every 6 hours, As Needed -anxiety/insomnia   sertraline 50 mg oral tablet: 3 tab(s) orally once a day

## 2021-12-03 NOTE — BH INPATIENT PSYCHIATRY PROGRESS NOTE - NSBHCONSBHPROVCNTCTNOFT_PSY_A_CORE
defer to primary team

## 2021-12-03 NOTE — BH INPATIENT PSYCHIATRY PROGRESS NOTE - NSICDXBHSECONDARYDX_PSY_ALL_CORE
Cocaine abuse, episodic use   F14.10  

## 2021-12-03 NOTE — BH INPATIENT PSYCHIATRY DISCHARGE NOTE - NSDCCPCAREPLAN_GEN_ALL_CORE_FT
PRINCIPAL DISCHARGE DIAGNOSIS  Diagnosis: MDD (major depressive disorder)  Assessment and Plan of Treatment:       SECONDARY DISCHARGE DIAGNOSES  Diagnosis: Cocaine abuse, episodic use  Assessment and Plan of Treatment:     Diagnosis: H/O ETOH abuse  Assessment and Plan of Treatment:      PRINCIPAL DISCHARGE DIAGNOSIS  Diagnosis: MDD (major depressive disorder)  Assessment and Plan of Treatment:       SECONDARY DISCHARGE DIAGNOSES  Diagnosis: Cocaine abuse, episodic use  Assessment and Plan of Treatment:     Diagnosis: H/O ETOH abuse  Assessment and Plan of Treatment:     Diagnosis: Generalized anxiety disorder  Assessment and Plan of Treatment:

## 2021-12-03 NOTE — BH INPATIENT PSYCHIATRY PROGRESS NOTE - NSDCCRITERIA_PSY_ALL_CORE
Symptom stabilization  CGI <=3

## 2021-12-03 NOTE — BH INPATIENT PSYCHIATRY DISCHARGE NOTE - MODIFICATIONS
Admitted for depression with SI reporting plan to shoot himself (no access to firearms or steps to obtain one). Evidence of autism, continued evaluation, discussed with patient who indicated it resonated with his experience. Mood and anxiety exacerbated by chronic polysubstance use. Evidence of OCD. Gradual improvement in coping strategies. Good response to medications, including anxiety and depression. Refused Vivitrol for AUD, otherwise limited engagement with substance use treatment.

## 2021-12-03 NOTE — BH INPATIENT PSYCHIATRY PROGRESS NOTE - MSE UNSTRUCTURED FT
Appears stated age.  Fair hygiene and grooming. Fair eye contact.  Oddly related.  Normal rate of speech with odd prosody and soft volume.  Mood is "depressed" and affect is depressed, constricted.  Thought process linear.  Thought content unremarkable.  Denies SI/HI.  Denies AVH.  Insight and judgment are fair.  Impulse control intact.  Cognition grossly intact.  
Appears stated age.  Fair hygiene and grooming. Fair eye contact.  Oddly related.  Normal rate of speech with odd prosody and soft volume.  Mood is "depressed" and affect is depressed, constricted.  Thought process linear.  Thought content unremarkable.  Denies SI/HI.  Denies AVH.  Insight and judgment are fair.  Impulse control intact.  Cognition grossly intact.  
Pt appeared stated age. His grooming was adequate. He was cooperative with examination as he was answering questions without any detectable frustration. He had appropriate eye contact. He displayed no abnormal movements. He is observed to have normal gait. His speech was normal volume, tone, rate and articulation. He reported his mood as “feeling good”. His affect was mildly irritable with full range and at times appears tense. His affect is congruent with his mood. His thought process is linear. His thought content is within normal limits. No perceptual disturbances appreciated including auditory and visual hallucinations. Pt is alert and oriented x4. Pt’s concentration, intelligence and memory are within normal limits. Pt language is within normal limits. Pt’s judgement is fair. Pt has insight into his condition, hospitalization, and diagnoses
Pt appeared stated age. His grooming was adequate. He was cooperative with examination as he was answering questions without any detectable frustration. He had appropriate eye contact. He displayed no abnormal movements. He is observed to have normal gait. His speech was normal volume, tone, rate and articulation. He reported his mood as “feeling anxious”. His affect was mildly anxious with full range and at times appears tense. His affect is congruent with his mood. His thought process is linear. His thought content is within normal limits. No perceptual disturbances appreciated including auditory and visual hallucinations. Pt is alert and oriented x4. Pt’s concentration, intelligence and memory are within normal limits. Pt language is within normal limits. Pt’s judgement is fair. Pt has insight into his condition, hospitalization, and diagnoses.
Appears stated age.  Fair hygiene and grooming. Fair eye contact.  Oddly related.  Normal rate of speech with odd prosody and soft volume.  Mood is "depressed" and affect is depressed, constricted.  Thought process linear.  Thought content unremarkable.  Denies SI/HI.  Denies AVH.  Insight and judgment are fair.  Impulse control intact.  Cognition grossly intact.  
Pt appeared stated age. His grooming was adequate. He was minimally cooperative with examination as he did not want to answer many questions. He had poor eye contact. He displayed repetitive hand movements and appeared restless. He is observed to have normal gait. His speech is loud at times, with normal rate and articulation. He reported his mood as “okay”. His affect was anxious and irritable with obvious irritability to answering questions not geared towards medications or discharge planning. His affect is non-congruent with his mood. His thought process is linear. His though content is within normal limits. No perceptual disturbances appreciated including auditory and visual hallucinations. Pt is alert and oriented x4. Pt’s concentration, intelligence and memory are within normal limits. Pt language is within normal limits. Pt’s judgement is fair. Pt has insight into his condition, hospitalization, and diagnoses.   
Pt appeared stated age. His grooming was adequate. He was cooperative with examination as he was answering questions without any detectable frustration. He had appropriate eye contact. He displayed no abnormal movements. He is observed to have normal gait. His speech was normal volume, tone, rate and articulation. He reported his mood as “feeling good”. His affect was euthymic with full range but at times appears tense. His affect is congruent with his mood. His thought process is linear. His thought content is within normal limits. No perceptual disturbances appreciated including auditory and visual hallucinations. Pt is alert and oriented x4. Pt’s concentration, intelligence and memory are within normal limits. Pt language is within normal limits. Pt’s judgement is fair. Pt has insight into his condition, hospitalization, and diagnoses.

## 2021-12-03 NOTE — BH INPATIENT PSYCHIATRY DISCHARGE NOTE - NSBHSUICIDESTATUS_PSY_ALL_CORE
Pt. denies SI/i/p. Risk factors include SI, anxiety, depression, non-compliance, substance use, previous hospitalizations.

## 2021-12-03 NOTE — BH INPATIENT PSYCHIATRY PROGRESS NOTE - NSBHASSESSSUMMFT_PSY_ALL_CORE
Pt is a 47 y/o male, with a PMH of anxiety, depression, polysubstance use and GERD, who presented to the ED voluntarily for suicidal ideation 11/24, now admitted to inpatient psychiatry for management of suicidal ideation.    Pt is a 47 y/o male, with a PMH of anxiety, depression, polysubstance use and GERD, who presented to the ED voluntarily for suicidal ideation 11/24, now admitted to inpatient psychiatry for management of suicidal ideation.     Pt reports improved mood, denies SI/i/p, and appears mildly irritable. Pt stated that he was not feeling anxious and is feeling good to go home. Pt excited about discharge. Pt appears stable for DC.     Plan    Psychiatry: Abilify 10mg, Zoloft 150mg, gabapentin 400mg TID, Cogentin 0.5mg AM and 1mg PM; Seroquel 50mg Q6 PRN for anxiety/insomnia  Dispo: D/C to [facility]

## 2021-12-03 NOTE — BH INPATIENT PSYCHIATRY DISCHARGE NOTE - CASE SUMMARY
Upon admission, the patient endorsed worsening depression with SI and plan to shoot himself in the head. The pt. denied access to firearms and denied preparatory acts. He also reported anxiety and polysubstance abuse, including cocaine and ETOH. The pt. reports his last drink was approximately 2 months ago. He was offered Vivitrol but refused stating he experienced SE (nausea). He denied AVH, delusions. The pt. presented with frenetic anxiety, was irritable, unable to tolerate an interview, rushing the writer. He was observed engaging compensatory measures, OCD-like behaviors to manage his anxiety on the unit. The pt. stated he believes he may be on the autism spectrum.   He responded well to medications for anxiety. He became more visible on the unit during the day, participated in groups. He reported relief of anxiety with titration of gabapentin and Seroquel PRN. He was less agitated, was able to better tolerate interviews. He denied depression including SI/i/p, prior to discharge. The pt. was seen by the treatment team and was no longer deemed an acute risk of harm to himself and appropriate for discharge. The pt. was educated on the risks associated with drinking and being on his medications.    The patient was started on Risperdal but reported poor tolerance. He was agreeable to Abilify and titrated to 10mg. Zoloft titrated to 150mg, gabapentin titrated to 400mg TID, Seroquel 50mg PRN for insomnia and anxiety, Cogentin 0.5mg AM, 1mg HS. Symptoms gradually improved over the course of hospitalization. The patient was made aware of the risks and benefits of each medication and tolerated them well with no apparent side effects.     There were no behavioral problems on the unit.  Patient did not become agitated, did not require emergency intramuscular medications or seclusion/restraints, and did not self-harm on the unit. Patient remained actively engaged in treatment and participated in individual, group, and milieu therapy.  Patient got along appropriately with staff and peers. The pt. did not have family involved.     Medically, during this hospitalization he remained stable.    Suicidal and aggression risk assessments were performed on the day of discharge. The patient is at elevated chronic risk of self-harm due to an underlying anxiety disorder. He is not actively suicidal or manic, making him appropriate for less restrictive treatment as an outpatient without need for continued inpatient hospitalization. Protective factors for suicide include future orientation, med compliance, lack of access, residential stability, good physical health. Risk factors include insomnia, impulsivity, anhedonia, poor social support, chronic SI, Hx of non-compliance, disengagement with treatment, substance use, impaired insight.     Immediate risk was minimized by inpatient admission to a safe environment with appropriate supervision and limited access to lethal means. Future risk was minimized before discharge by treatment of anxiety/depressive symptoms, maximizing outpatient support, providing relevant patient education, discussing emergency procedures, and ensuring close follow-up. Patient denies all suicidal and aggressive/homicidal ideation, intent and plan, and, in view of demonstrated clinical improvement, is not judged to be an acute danger to self or others at this time. Although the patient remains at their chronic baseline risk of self-harm, such risk cannot be further ameliorated by continued inpatient treatment and the patient is therefore appropriate for discharge.     On the day of discharge, the patient’s mood and affect are normal/euthymic. Patient denies depressed mood and anxiety. Patient is not hopeless. Sleep and appetite are also normal (at baseline).  Pt’s motor performance and productivity of speech are WNL. Pt denies symptoms of psychosis including AH/VH with no apparent delusions (or is at baseline/not preoccupied with delusions).  Patient denies S/H/I/I/P.     Patient has made clinically meaningful progress during this hospitalization and has clearly benefited from medications and psychotherapy. On day of discharge, the patient has improved significantly and no longer requires inpatient treatment and care. Pt will be discharged and follow-up with outpatient care.      Patient was provided with extensive psychoeducation on treatment options and motivational counseling targeting healthy lifestyle. Patient was instructed on actions for crisis situations, understood and agreed to follow instructions for handling crisis, including coming to ER or calling 911 should the patient or their family feel that they are in danger of hurting self or others. Patient also was given Suicide Prevention Lifeline number 1-787.929.5261 and provided with instructions on its use.   Patient was advised to avoid driving until their reactions are not impaired by medications. Motivational counseling was provided. Family is supportive and was provided with similar safety plan instructions.     Patient will be discharged with the following DSM5 Diagnoses:    PRINCIPAL DISCHARGE DIAGNOSIS  Diagnosis: MDD (major depressive disorder)      SECONDARY DISCHARGE DIAGNOSES  Diagnosis: Cocaine abuse, episodic use    Diagnosis: H/O ETOH abuse    Diagnosis: Generalized anxiety disorder    Patient will be discharged on the following medications:   ARIPiprazole 10 mg oral tablet: 1 tab(s) orally once a day  benztropine 0.5 mg oral tablet: 1 tab(s) orally once a day  benztropine 1 mg oral tablet: 1 tab(s) orally once a day (at bedtime)  gabapentin 400 mg oral capsule: 1 cap(s) orally 3 times a day  melatonin 5 mg oral tablet: 1 tab(s) orally once a day (at bedtime)   nicotine 2 mg oral transmucosal gum: 1 gum oral transmucosal every 2 hours  nicotine 21 mg/24 hr transdermal film, extended release: 1 patch transdermal once a day   QUEtiapine 50 mg oral tablet: 1 tab(s) orally every 6 hours, As Needed -anxiety/insomnia   sertraline 50 mg oral tablet: 3 tab(s) orally once a day    Handoff faxed to [facility] [phone], including discussion of hospital course, treatment, and medications. The patient’s appointment is on 10-Dec-2021 at 09:30.  Inpatient Provider:  SUE White-BC  [phone]

## 2021-12-03 NOTE — BH TREATMENT PLAN - NSTXSUICIDINTERPR_PSY_ALL_CORE
Pt met his goal. Pt has verbalized his coping skills such as talk to others, reading, journaling, talk on the phone, attend 12 step meeting, watch TV and listen to music to manage symptoms.

## 2021-12-03 NOTE — BH INPATIENT PSYCHIATRY DISCHARGE NOTE - HPI (INCLUDE ILLNESS QUALITY, SEVERITY, DURATION, TIMING, CONTEXT, MODIFYING FACTORS, ASSOCIATED SIGNS AND SYMPTOMS)
Patient was seen and evaluated, chart reviewed. Case discussed with nursing team.  On service for this 47 y/o  male  with PPHx of Major Depressive  Disorder, Anxiety and Polysubstance Abuse. Patient is hospitalized with a primary problem of worsening depression with SI.  Patient admitted to [facility] on a 9.27 legal status. I have reviewed the initial psychiatric assessment in the electronic medical record, including the history of present illness, past psychiatric history, family/social history (no pertinent changes), and exam, and have confirmed the salient findings dated .  As per chart review, transferring records indicated the followin y/o male with PMH of anxiety, depression, polysubstance abuse and GERD presents with suicidal ideation. Pt states that he wanted to "blow his brains out with a gun". Pt states he was d/c from [facility] earlier this month with  "a bag of medications" and then threw them away because he doesn't believe any of the medications were helping him. Utox positive for cocaine, states that he last used 4-5 days ago in addition to drinking beer daily. He states he is able to sleep 8 hours a night but is still tired, without energy and depressed. Pt wants to get better but does not know the best way to him since he doesn't believe the medications helped.  Patient presented to admissions at [facility] but was sent to [facility] for medical clearance. He lives in [place].  Pt. Denies symptoms of COVID19  Reports receiving J&J vaccine in september   Pt. Denies leaving [place] within the past 2 weeks    On unit: Information Received From: Chart review and patient interview  Discussed precipitants to warrant services. Patient reports depressive symptoms started several weeks ago after he was discharged from [facility].  Patient reports he threw out his medications because he believed they would not work.  Patient reports  symptoms are persistent most of the day, more days than none. Patient describes daily low mood, and anxiety.  Also reports neurovegative symptoms of difficulty concentrating, poor appetite, insomnia, feelings of sadness, anhedonia, hopelessness, worthlessness, amotivational, feelings of restlessness/anxiety. Client reports she feels stressed and overwhelmed. Patient reports he was +SI with intent to purchase gun and shoot self. Patient reports he frych7h to shot self in head to “make it fatal.” Denies having access to firearm but states “I can buy one”  Currently denies passive death wish and SI. Says that he was having feelings of wanting to hurt others, but currently has no violent thoughts or HI. Patient reports feeling safe on unit.  Patient’s facial expression, demeanor, posture/attitude during interview convey an underlying depressed/anxious mood. At time of interview patient denies SI/SIB/HI, no formulated plan or intent, and engages in safety planning.    Denies AVH, delusions, psychotic disorganization or paranoia. Patient denies any symptoms suggestive of sania: (denied grandiosity/ racing thoughts/ increased goal directed activities or engaged in risk taking behavior/ no pressured speech/ no elevated mood/ denied any increased in energy level causing sleep disruption). Patient denies any past trauma experience.  Patient is also abusing cocaine, admitting labs +u-tox cocaine, pt denies use states “I must of smoked something.” Reports allergies peanuts (throat closes).

## 2021-12-03 NOTE — BH INPATIENT PSYCHIATRY PROGRESS NOTE - NSTXSUICIDGOAL_PSY_ALL_CORE
Will identify and utilize 2 coping skills

## 2021-12-03 NOTE — BH INPATIENT PSYCHIATRY PROGRESS NOTE - NSBHATTESTSEENBY_PSY_A_CORE
NP without Attending Psychiatrist
attending Psychiatrist without NP/Trainee
NP without Attending Psychiatrist
NP without Attending Psychiatrist

## 2021-12-03 NOTE — BH INPATIENT PSYCHIATRY DISCHARGE NOTE - OTHER PAST PSYCHIATRIC HISTORY (INCLUDE DETAILS REGARDING ONSET, COURSE OF ILLNESS, INPATIENT/OUTPATIENT TREATMENT)
Pt is a 45 y/o male domiciled alone in private apartment in [place],  of homelessness, unemployed, with PPHx of anxiety, depression, polysubstance abuse, d/c from [facility] earlier this month where he was admitted for rehab, pt admits to medication non compliance upon discharge. Pt presented to ED with suicidal ideation.

## 2021-12-03 NOTE — BH INPATIENT PSYCHIATRY PROGRESS NOTE - NSBHCHARTREVIEWVS_PSY_A_CORE FT
Vital Signs Last 24 Hrs  T(C): 37.1 (12-03-21 @ 06:05), Max: 37.1 (12-03-21 @ 06:05)  T(F): 98.7 (12-03-21 @ 06:05), Max: 98.7 (12-03-21 @ 06:05)  HR: --  BP: --  BP(mean): --  RR: --  SpO2: --    Orthostatic VS  12-03-21 @ 06:05  Lying BP: --/-- HR: --  Sitting BP: 127/76 HR: 71  Standing BP: 126/64 HR: 76  Site: --  Mode: --  Orthostatic VS  12-02-21 @ 18:36  Lying BP: --/-- HR: --  Sitting BP: 135/82 HR: 87  Standing BP: 128/74 HR: 92  Site: --  Mode: --  Orthostatic VS  12-02-21 @ 06:24  Lying BP: --/-- HR: --  Sitting BP: 126/74 HR: 70  Standing BP: 119/69 HR: 80  Site: --  Mode: --

## 2021-12-03 NOTE — BH INPATIENT PSYCHIATRY PROGRESS NOTE - NSBHFUPINTERVALHXFT_PSY_A_CORE
Patient is being followed up for depression and sania. Chart, medications, and labs reviewed. Pt is discussed with nursing staff. Pt reported that he had pretty good sleep last night, specifying that he was in the calming room until about 10pm and was requested to go to his room to sleep after. Pt noted that he took PRN meds to alleviate anxiety so that he could sleep in his room. Upon medication review, pt took Seroquel 50mg and Benadryl 50mg around midnight last night.    Upon speaking with him today, the pt appeared significantly less anxious and irritable than yesterday and was able to carry the conversation without frustration noted. He noted that his racing thoughts have improved from yesterday evening and that he feels less anxious than yesterday. Pt was concerned about exactly when he would be discharged tomorrow. When asked about whether he would be compliant with medications after leaving [facility], he reported that he would be compliant and that he thinks the medications are helping him. He denied SI/i/p, stating he feels much better in terms of mood compared to when he first came in. Pt reported that the chronic pain he has been experiencing has improved from yesterday which he attributes to the medications. Pt endorsed ETOH misuse and is stating that he wants to attend rehab and in-person meetings to help quit drinking alcohol. He denied any hallucinations, or delusions. Pt denied any concerns with medication regimen including side effects. VSS  
Patient is being followed up for depression and sania. Chart, medications, and labs reviewed. Pt is discussed with nursing staff. No significant overnight issues. Pt reported that he had difficulty sleeping last night secondary to being monitored every 15 minutes. Pt asked for medications to help him sleep at night. Pt is adherent with Abilify and Zoloft.   The pt appeared anxious and irritable, stating that he would like to be promised that he would be discharged on Friday. He denied SI/i/p, stating he feels much better in terms of mood compared to when he first came in. Pt endorsed anxiety and chronic pain, requesting an increased in dosage of gabapentin. Pt endorsed ETOH misuse but stating he does not know when he last used alcohol. Pt continues to refuse Vivitrol, stating that he has experienced nausea on it before. He denied AVH, delusions. Cogentin initiated for possible akathisia related to Abilify titration.   
Chart reviewed.  Discussed with treatment team.  Interviewed patient in his room.  Adherent with meds.  No overnight events.  Patient known to me from recent prior admission to [facility].  Patient states he is feeling increasingly depressed.  Had not taken prescribed medications on leaving the hospital.  States he did not follow through on his plan to move and seek out rehab in another state.  Denies SI today but feels increasingly depressed.  Does not feel any effect of medications at present.  Denies adverse effects.  No AH or paranoia. Reports anxiety and requests additional PRN but counseled to use Atarax as needed.  No alcohol withdrawal sx appreciated on exam.  
Patient is being followed up for depression and sania. Chart, medications, and labs reviewed. Pt is discussed with nursing staff. Pt reported that he had okay sleep last night, specifying that he was in the calming room until late. Upon speaking with him today, the pt appeared slightly irritable and was able to carry the conversation without frustration noted. He noted that he does not feel anxious currently. Pt is excited that he is being discharged today. When asked about whether he would be compliant with medications after leaving [facility], he reported that he would be compliant and that he thinks the medications are helping him. He denied SI/i/p, stating he feels much better in terms of mood compared to when he first came in. Pt endorsed ETOH misuse and is stating that he wants to attend rehab and in-person meetings to help quit drinking alcohol. He denied any hallucinations, or delusions. Pt denied any concerns with medication regimen including side effects. VSS. 
Patient is being followed up for depression and sania. Chart, medications, and labs reviewed. Pt is discussed with nursing staff. Pt reported that he had difficulty sleeping last night secondary to feeling “anxious and restless with racing thoughts”. Pt noted that he was able to sleep in the calming room after taking PRN meds. Upon medication review, pt took the following PRN meds yesterday: Seroquel 50mg at 1215pm and 5pm, Ativan 2mg at 6pm, Haldol 5mg at 630pm, and Benadryl 50mg at 630pm. Pt asked for these medications to alleviate his anxiety and restlessness. Pt is adherent with Abilify and Zoloft.     Upon speaking with him today, the pt appeared significantly less anxious and irritable than yesterday and was able to carry the conversation without frustration noted. He noted that his racing thoughts have improved from yesterday evening and that he feels less anxious than yesterday. Pt stated that he would like to be discharged on Friday. He denied SI/i/p, stating he feels much better in terms of mood compared to when he first came in. Pt reported that the chronic pain he has been experiencing has improved from yesterday which he attributes to the medications. Pt endorsed ETOH misuse and is stating that he wants to attend rehab and in-person meetings to help quit drinking alcohol. He denied any hallucinations, or delusions. Pt is continuing to take Cogentin for possible akathisia related to Abilify titration. Pt denied any concerns with medication regimen including side effects.   
Patient is followed up for depression and sania. Chart, medications and labs reviewed.  Patient is discussed with nursing staff. No significant overnight issues.  Per nursing report patient remains compliant with his Zoloft but is refusing Risperdal.  Patient reports he will not take Risperdal because “I don’t like the way it makes me feel” pt reports it makes him dizzy with headache. Discussed alternative psychotropics, pt reports is willing to take Abilify.   Patient in good behavioral control, there has been no aggression, no prns for aggression.  No mention of sleep disturbances or appetite concerns.  Interviewed patient in his room.  Patient describes low mood, and anxiety.  Also reports neurovegative symptoms of difficulty concentrating, poor appetite, insomnia, feelings of sadness, anhedonia, hopelessness, worthlessness, amotivational, feelings of restlessness/anxiety.  Patient’s facial expression, demeanor, posture/attitude during interview convey an underlying depressed/anxious mood. At time of interview pt denies SI/SIB/HI but feels increasingly depressed.  Does not feel any effect of medications at present.  Denies adverse effects.  No AH or paranoia. Reports anxiety and requests additional PRN but counseled to use Atarax as needed.  No acute medical concerns, VSS. Will continue to provide therapeutic support. Continue treatment for risk modification.   
Patient is followed up for depression and sania. Chart, medications and labs reviewed.  Patient is discussed with nursing staff. No significant overnight issues.  Per nursing report patient remains compliant with his Zoloft but is refusing Risperdal.  Patient reports he will not take Risperdal because “I don’t like the way it makes me feel” pt reports it makes him dizzy with headache. Discussed alternative psychotropics, pt reports is willing to take Abilify. Patient compliant with Abilify yesterday reports he is tolerating it better than the Risperdal.   Patient in good behavioral control, there has been no aggression, no prns for aggression.  No mention of sleep disturbances or appetite concerns.  Interviewed patient in his room.  Patient describes low, dysphoric mood, and anxiety. At time of interview pt denies SI/SIB/HI but feels increasingly depressed.  Does not feel any effect of medications at present.  Denies adverse effects.  No AH or paranoia. Reports anxiety and requests additional PRN but counseled to use Atarax as needed. Patient reports Seroquel helps with his anxiety. Continues to need prn, received Atarax, trazodone and Benadryl.  No acute medical concerns, VSS. Will continue to provide therapeutic support. Continue treatment for risk modification.   
Patient is followed up for depression and sania. Chart, medications and labs reviewed.  Patient is discussed with nursing staff. No significant overnight issues.  Pt. reports sleeping normally, no changes in appetite. He is adherent with Abilify and Zoloft. No SE noted. No PRNS.  The pt. appeared anxious, slightly irritable, stating that he would like to be discharged at the end of the week. He denied SI/i/p, stating he is not aware of any triggers for the episode, but is feeling better. He endorses anxiety and chronic pain, agreeing to gabapentin. The pt. endorsed ETOH misuse several months ago. He refused Vivitrol, stating that he has experienced nausea on it before. He denies AVH, delusions. Later on, nursing reports he has been medication seeking; medicated with Atarax and gabapentin for anxiety. Cogentin initiated for possible akathisia related to Abilify titration.

## 2021-12-03 NOTE — BH INPATIENT PSYCHIATRY DISCHARGE NOTE - HOSPITAL COURSE
to be completed Upon admission, the patient endorsed worsening depression with SI and plan to shoot himself in the head. The pt. denied access to firearms and denied preparatory acts. He also reported anxiety and polysubstance abuse, including cocaine and ETOH. The pt. reports his last drink was approximately 2 months ago. He was offered Vivitrol but refused stating he experienced SE (nausea). He denied AVH, delusions. The pt. presented with frenetic anxiety, was irritable, unable to tolerate an interview, rushing the writer. He was observed engaging compensatory measures, OCD-like behaviors to manage his anxiety on the unit. The pt. stated he believes he may be on the autism spectrum.   He responded well to medications for anxiety. He became more visible on the unit during the day, participated in groups. He reported relief of anxiety with titration of gabapentin and Seroquel PRN. He was less agitated, was able to better tolerate interviews. He denied depression including SI/i/p, prior to discharge. The pt. was seen by the treatment team and was no longer deemed an acute risk of harm to himself and appropriate for discharge. The pt. was educated on the risks associated with drinking and being on his medications.    The patient was started on Risperdal but reported poor tolerance. He was agreeable to Abilify and titrated to 10mg. Zoloft titrated to 150mg, gabapentin titrated to 400mg TID, Seroquel 50mg PRN for insomnia and anxiety, Cogentin 0.5mg AM, 1mg HS. Symptoms gradually improved over the course of hospitalization. The patient was made aware of the risks and benefits of each medication and tolerated them well with no apparent side effects.     There were no behavioral problems on the unit.  Patient did not become agitated, did not require emergency intramuscular medications or seclusion/restraints, and did not self-harm on the unit. Patient remained actively engaged in treatment and participated in individual, group, and milieu therapy.  Patient got along appropriately with staff and peers. The pt. did not have family involved.     Medically, during this hospitalization he remained stable.    Suicidal and aggression risk assessments were performed on the day of discharge. The patient is at elevated chronic risk of self-harm due to an underlying anxiety disorder. He is not actively suicidal or manic, making him appropriate for less restrictive treatment as an outpatient without need for continued inpatient hospitalization. Protective factors for suicide include future orientation, med compliance, lack of access, residential stability, good physical health. Risk factors include insomnia, impulsivity, anhedonia, poor social support, chronic SI, Hx of non-compliance, disengagement with treatment, substance use, impaired insight.     Immediate risk was minimized by inpatient admission to a safe environment with appropriate supervision and limited access to lethal means. Future risk was minimized before discharge by treatment of anxiety/depressive symptoms, maximizing outpatient support, providing relevant patient education, discussing emergency procedures, and ensuring close follow-up. Patient denies all suicidal and aggressive/homicidal ideation, intent and plan, and, in view of demonstrated clinical improvement, is not judged to be an acute danger to self or others at this time. Although the patient remains at their chronic baseline risk of self-harm, such risk cannot be further ameliorated by continued inpatient treatment and the patient is therefore appropriate for discharge.     On the day of discharge, the patient’s mood and affect are normal/euthymic. Patient denies depressed mood and anxiety. Patient is not hopeless. Sleep and appetite are also normal (at baseline).  Pt’s motor performance and productivity of speech are WNL. Pt denies symptoms of psychosis including AH/VH with no apparent delusions (or is at baseline/not preoccupied with delusions).  Patient denies S/H/I/I/P.     Patient has made clinically meaningful progress during this hospitalization and has clearly benefited from medications and psychotherapy. On day of discharge, the patient has improved significantly and no longer requires inpatient treatment and care. Pt will be discharged and follow-up with outpatient care.      Patient was provided with extensive psychoeducation on treatment options and motivational counseling targeting healthy lifestyle. Patient was instructed on actions for crisis situations, understood and agreed to follow instructions for handling crisis, including coming to ER or calling 911 should the patient or their family feel that they are in danger of hurting self or others. Patient also was given Suicide Prevention Lifeline number 1-289.433.3531 and provided with instructions on its use.   Patient was advised to avoid driving until their reactions are not impaired by medications. Motivational counseling was provided. Family is supportive and was provided with similar safety plan instructions.     Patient will be discharged with the following DSM5 Diagnoses:    PRINCIPAL DISCHARGE DIAGNOSIS  Diagnosis: MDD (major depressive disorder)      SECONDARY DISCHARGE DIAGNOSES  Diagnosis: Cocaine abuse, episodic use    Diagnosis: H/O ETOH abuse    Diagnosis: Generalized anxiety disorder    Patient will be discharged on the following medications:   ARIPiprazole 10 mg oral tablet: 1 tab(s) orally once a day  benztropine 0.5 mg oral tablet: 1 tab(s) orally once a day  benztropine 1 mg oral tablet: 1 tab(s) orally once a day (at bedtime)  gabapentin 400 mg oral capsule: 1 cap(s) orally 3 times a day  melatonin 5 mg oral tablet: 1 tab(s) orally once a day (at bedtime)   nicotine 2 mg oral transmucosal gum: 1 gum oral transmucosal every 2 hours  nicotine 21 mg/24 hr transdermal film, extended release: 1 patch transdermal once a day   QUEtiapine 50 mg oral tablet: 1 tab(s) orally every 6 hours, As Needed -anxiety/insomnia   sertraline 50 mg oral tablet: 3 tab(s) orally once a day    Handoff faxed to [facility] [phone], including discussion of hospital course, treatment, and medications. The patient’s appointment is on 10-Dec-2021 at 09:30.  Inpatient Provider:  SUE White-[facility]  [phone]

## 2021-12-24 ENCOUNTER — EMERGENCY (EMERGENCY)
Facility: HOSPITAL | Age: 46
LOS: 1 days | Discharge: HOME | End: 2021-12-24
Attending: EMERGENCY MEDICINE
Payer: MEDICARE

## 2021-12-24 VITALS
TEMPERATURE: 98 F | OXYGEN SATURATION: 96 % | RESPIRATION RATE: 18 BRPM | SYSTOLIC BLOOD PRESSURE: 135 MMHG | DIASTOLIC BLOOD PRESSURE: 85 MMHG | HEART RATE: 95 BPM

## 2021-12-24 PROCEDURE — 99284 EMERGENCY DEPT VISIT MOD MDM: CPT

## 2021-12-25 VITALS
SYSTOLIC BLOOD PRESSURE: 118 MMHG | HEART RATE: 89 BPM | TEMPERATURE: 97 F | OXYGEN SATURATION: 94 % | DIASTOLIC BLOOD PRESSURE: 71 MMHG | RESPIRATION RATE: 18 BRPM

## 2021-12-25 DIAGNOSIS — F60.2 ANTISOCIAL PERSONALITY DISORDER: ICD-10-CM

## 2021-12-25 DIAGNOSIS — F10.10 ALCOHOL ABUSE, UNCOMPLICATED: ICD-10-CM

## 2021-12-25 DIAGNOSIS — Z76.5 MALINGERER [CONSCIOUS SIMULATION]: ICD-10-CM

## 2021-12-25 DIAGNOSIS — F14.14 COCAINE ABUSE WITH COCAINE-INDUCED MOOD DISORDER: ICD-10-CM

## 2021-12-25 LAB
ALBUMIN SERPL ELPH-MCNC: 3.4 G/DL — LOW (ref 3.5–5)
ALP SERPL-CCNC: 89 U/L — SIGNIFICANT CHANGE UP (ref 40–120)
ALT FLD-CCNC: 105 U/L DA — HIGH (ref 10–60)
ANION GAP SERPL CALC-SCNC: 6 MMOL/L — SIGNIFICANT CHANGE UP (ref 5–17)
APAP SERPL-MCNC: <2 UG/ML — LOW (ref 10–30)
AST SERPL-CCNC: 67 U/L — HIGH (ref 10–40)
BASOPHILS # BLD AUTO: 0.04 K/UL — SIGNIFICANT CHANGE UP (ref 0–0.2)
BASOPHILS NFR BLD AUTO: 0.5 % — SIGNIFICANT CHANGE UP (ref 0–2)
BILIRUB SERPL-MCNC: 0.4 MG/DL — SIGNIFICANT CHANGE UP (ref 0.2–1.2)
BUN SERPL-MCNC: 16 MG/DL — SIGNIFICANT CHANGE UP (ref 7–18)
CALCIUM SERPL-MCNC: 8.7 MG/DL — SIGNIFICANT CHANGE UP (ref 8.4–10.5)
CHLORIDE SERPL-SCNC: 109 MMOL/L — HIGH (ref 96–108)
CO2 SERPL-SCNC: 28 MMOL/L — SIGNIFICANT CHANGE UP (ref 22–31)
CREAT SERPL-MCNC: 1.22 MG/DL — SIGNIFICANT CHANGE UP (ref 0.5–1.3)
EOSINOPHIL # BLD AUTO: 0.17 K/UL — SIGNIFICANT CHANGE UP (ref 0–0.5)
EOSINOPHIL NFR BLD AUTO: 1.9 % — SIGNIFICANT CHANGE UP (ref 0–6)
ETHANOL SERPL-MCNC: <3 MG/DL — SIGNIFICANT CHANGE UP (ref 0–10)
GLUCOSE SERPL-MCNC: 112 MG/DL — HIGH (ref 70–99)
HCT VFR BLD CALC: 39.8 % — SIGNIFICANT CHANGE UP (ref 39–50)
HGB BLD-MCNC: 13.3 G/DL — SIGNIFICANT CHANGE UP (ref 13–17)
IMM GRANULOCYTES NFR BLD AUTO: 0.6 % — SIGNIFICANT CHANGE UP (ref 0–1.5)
LYMPHOCYTES # BLD AUTO: 2.36 K/UL — SIGNIFICANT CHANGE UP (ref 1–3.3)
LYMPHOCYTES # BLD AUTO: 26.8 % — SIGNIFICANT CHANGE UP (ref 13–44)
MCHC RBC-ENTMCNC: 28.9 PG — SIGNIFICANT CHANGE UP (ref 27–34)
MCHC RBC-ENTMCNC: 33.4 GM/DL — SIGNIFICANT CHANGE UP (ref 32–36)
MCV RBC AUTO: 86.5 FL — SIGNIFICANT CHANGE UP (ref 80–100)
MONOCYTES # BLD AUTO: 0.72 K/UL — SIGNIFICANT CHANGE UP (ref 0–0.9)
MONOCYTES NFR BLD AUTO: 8.2 % — SIGNIFICANT CHANGE UP (ref 2–14)
NEUTROPHILS # BLD AUTO: 5.48 K/UL — SIGNIFICANT CHANGE UP (ref 1.8–7.4)
NEUTROPHILS NFR BLD AUTO: 62 % — SIGNIFICANT CHANGE UP (ref 43–77)
NRBC # BLD: 0 /100 WBCS — SIGNIFICANT CHANGE UP (ref 0–0)
PLATELET # BLD AUTO: 234 K/UL — SIGNIFICANT CHANGE UP (ref 150–400)
POTASSIUM SERPL-MCNC: 3.5 MMOL/L — SIGNIFICANT CHANGE UP (ref 3.5–5.3)
POTASSIUM SERPL-SCNC: 3.5 MMOL/L — SIGNIFICANT CHANGE UP (ref 3.5–5.3)
PROT SERPL-MCNC: 7.4 G/DL — SIGNIFICANT CHANGE UP (ref 6–8.3)
RBC # BLD: 4.6 M/UL — SIGNIFICANT CHANGE UP (ref 4.2–5.8)
RBC # FLD: 13.2 % — SIGNIFICANT CHANGE UP (ref 10.3–14.5)
SALICYLATES SERPL-MCNC: 2.5 MG/DL — LOW (ref 2.8–20)
SARS-COV-2 RNA SPEC QL NAA+PROBE: SIGNIFICANT CHANGE UP
SODIUM SERPL-SCNC: 143 MMOL/L — SIGNIFICANT CHANGE UP (ref 135–145)
WBC # BLD: 8.82 K/UL — SIGNIFICANT CHANGE UP (ref 3.8–10.5)
WBC # FLD AUTO: 8.82 K/UL — SIGNIFICANT CHANGE UP (ref 3.8–10.5)

## 2021-12-25 PROCEDURE — 99285 EMERGENCY DEPT VISIT HI MDM: CPT | Mod: 25

## 2021-12-25 PROCEDURE — 80053 COMPREHEN METABOLIC PANEL: CPT

## 2021-12-25 PROCEDURE — 93005 ELECTROCARDIOGRAM TRACING: CPT

## 2021-12-25 PROCEDURE — 85025 COMPLETE CBC W/AUTO DIFF WBC: CPT

## 2021-12-25 PROCEDURE — 36415 COLL VENOUS BLD VENIPUNCTURE: CPT

## 2021-12-25 PROCEDURE — 71046 X-RAY EXAM CHEST 2 VIEWS: CPT | Mod: 26

## 2021-12-25 PROCEDURE — 72125 CT NECK SPINE W/O DYE: CPT | Mod: 26,MA

## 2021-12-25 PROCEDURE — 93010 ELECTROCARDIOGRAM REPORT: CPT

## 2021-12-25 PROCEDURE — 72125 CT NECK SPINE W/O DYE: CPT | Mod: MA

## 2021-12-25 PROCEDURE — 70450 CT HEAD/BRAIN W/O DYE: CPT | Mod: MA

## 2021-12-25 PROCEDURE — 71046 X-RAY EXAM CHEST 2 VIEWS: CPT

## 2021-12-25 PROCEDURE — 87635 SARS-COV-2 COVID-19 AMP PRB: CPT

## 2021-12-25 PROCEDURE — 70450 CT HEAD/BRAIN W/O DYE: CPT | Mod: 26,MA

## 2021-12-25 PROCEDURE — 80307 DRUG TEST PRSMV CHEM ANLYZR: CPT

## 2021-12-25 RX ORDER — ONDANSETRON 8 MG/1
4 TABLET, FILM COATED ORAL ONCE
Refills: 0 | Status: COMPLETED | OUTPATIENT
Start: 2021-12-25 | End: 2021-12-25

## 2021-12-25 RX ORDER — ACETAMINOPHEN 500 MG
650 TABLET ORAL ONCE
Refills: 0 | Status: COMPLETED | OUTPATIENT
Start: 2021-12-25 | End: 2021-12-25

## 2021-12-25 RX ADMIN — Medication 650 MILLIGRAM(S): at 00:59

## 2021-12-25 RX ADMIN — Medication 650 MILLIGRAM(S): at 01:29

## 2021-12-25 RX ADMIN — ONDANSETRON 4 MILLIGRAM(S): 8 TABLET, FILM COATED ORAL at 00:59

## 2021-12-25 NOTE — ED PROVIDER NOTE - CLINICAL SUMMARY MEDICAL DECISION MAKING FREE TEXT BOX
Will perform CAT scan of the head and C-spine and provide symptomatic treatment for a likely concussion.

## 2021-12-25 NOTE — ED BEHAVIORAL HEALTH ASSESSMENT NOTE - PAST PSYCHOTROPIC MEDICATION
trazodone, risperdal, lithium, Topamax, Seroquel risperdal, lithium, Topomax, risperdal, lithium, Topomax, Abilify, Wellbutrin, Geodon, Lexapro, trazodone , Valium

## 2021-12-25 NOTE — ED BEHAVIORAL HEALTH ASSESSMENT NOTE - OTHER PAST PSYCHIATRIC HISTORY (INCLUDE DETAILS REGARDING ONSET, COURSE OF ILLNESS, INPATIENT/OUTPATIENT TREATMENT)
see HPI see HPI  as per [facility] ED Psych eval on 5/05/2015 "single (never-) unemployed 39-year-old  male, with no dependents, domiciled in a room share residence called [facility] in [place], with PPDx of depression (diagnosed age 23), no prior psychiatric hospitalizations, history fleeting passive suicidal ideation with no intention/plan, no prior suicide attempts, no prior homicidal ideation, no prior AVTH, no prior violence/legal/arrest history, with chronic history of substance abuse (ETOH and cocaine: last used Wednesday and Friday respectively), with history of substance-abuse rehabilitation program admissions (last February 2015), no prior withdrawals/DTs/seizures, with no access to guns, with no prior family psychiatric history, was brought in by self complaining of depression in the setting of substance abuse relapse.     Patient reports to have been suffering from depression and substance abuse since he was 23-years-old. Reports to have been attended multiple substance-abuse rehabilitation program, most recently at [facility], in [place] February 2015. Reports to have stayed there a very short time, about 10 days, and was discharged to a sober house in [place], where he continued outpatient treatment with a [facility] associated facility, which continued into late March. Reports to have been discharged from the program with a 3 month supply of Wellbutrin  mg once daily, with which he has been complaint. Reports to have come back to [place], where he stayed in a room share, at a place called [facility], in [place]. Reports to have relapsed multiple times over the past month, stating that he would be sober for a 3-4 days, and start using again. Reports to have last used alcohol last week Wednesday, and cocaine last Friday, the day he also tried heroin for the first time. Reports to have slept most of the weekend, Saturday and Sunday, and on Monday started reading his substance recovery literature he has gathered from his A.A and N.A meetings he has attended. Reports to have also called his A.A support group (people that are sober) for support and guidance. Reports to have went home last night, and spent a night with his mother (81), with plans to come to [facility] today after a recommendation from his brother (retired police). Reports to have gotten valium 7.5 mg to help stay calm and sleep."

## 2021-12-25 NOTE — ED PROVIDER NOTE - OBJECTIVE STATEMENT
46 year old male with no significant PMHx presenting to the ED today S/P a fall. Patient endorses that he was drinking, after which he stumbled on a street curb causing him to fall backwards and hit the right side of the back of his head on the ground. Patient reports positive loss of consciousness at the time, and is now noting a mild headache and mild neck discomfort. Patient additionally reports feeling nauseous, however denies any episodes of emesis. Patient denies any numbness, tingling, or weakness in his extremities and all other acute complaints. NKDA.

## 2021-12-25 NOTE — ED ADULT NURSE NOTE - OBJECTIVE STATEMENT
Patient presents to the ED for fall. He states that he was running to catch the bus and fell, hitting right side of head. No external bleed noted, no hematoma.  He is AAOx4, in no acute distress.

## 2021-12-25 NOTE — ED PROVIDER NOTE - PATIENT PORTAL LINK FT
You can access the FollowMyHealth Patient Portal offered by [facility] by registering at the following website: [email or web address]. By joining [employer]’s FollowMyHealth portal, you will also be able to view your health information using other applications (apps) compatible with our system. You can access the FollowMyHealth Patient Portal offered by [facility] by registering at the following website: [email or web address]. By joining [employer]’s FollowMyHealth portal, you will also be able to view your health information using other applications (apps) compatible with our system.

## 2021-12-25 NOTE — ED BEHAVIORAL HEALTH ASSESSMENT NOTE - RISK ASSESSMENT
Chronic risk factors: single, male gender, ongoing chronic polysubstance abuse, highly likely Antisocial Personality Disorder, hx of intentional threatening behavior to have needs met; long hx of refusing to attend/complete outpatient substance abuse services; undomiciled, reports ongoing unemployment. Protective factors: young; healthy; no history of actual suicide attempts; access to health services. No acute risk factors identified - presenting at baseline as supported by extensive chart history Low Acute Suicide Risk

## 2021-12-25 NOTE — ED ADULT NURSE NOTE - CHPI ED NUR SYMPTOMS NEG
no abrasion/no bleeding/no confusion/no deformity/no loss of consciousness/no numbness/no tingling/no vomiting/no weakness

## 2021-12-25 NOTE — ED PROVIDER NOTE - MUSCULOSKELETAL, MLM
Spine appears normal, range of motion is not limited, no muscle or joint tenderness. Full range of motion of bilateral upper and lower extremities.

## 2021-12-25 NOTE — ED BEHAVIORAL HEALTH ASSESSMENT NOTE - HPI (INCLUDE ILLNESS QUALITY, SEVERITY, DURATION, TIMING, CONTEXT, MODIFYING FACTORS, ASSOCIATED SIGNS AND SYMPTOMS)
PATIENT HAS ANOTHER [facility] MRN # [id number]: Patient is a 45 yo  male, single, noncaregiver, unemployed (states he has worked as a  6 months ago), undomiciled and was [facility] late October/early November of this year which he left due to continued substance use, with subsequent going in/out of medical facilities in what is suspected to be in order to avoid homeless shelters with chronologically correlating ED visits verbalizing suicidal ideation and request for admission with long charted hx of "throwing away" medications on discharge and not following up on outpatient referrals; + long term, continuous Polysubstance Abuse (12 beers q2 days; no known hx of complicated withdrawals), Cocaine Abuse (UTOX positive on last several D visits; was not done today at  ED), + hx of arrest for drug possession; extensive inpatient psychiatric documentation outlining Antisocial Personality traits and intentional acting out behaviors including threats when he was close to discharge (ie. ..."in his room, he became agitated and violent at night and needed IM prns and seclusion.  He flipped a table and threatened to punch his roommate.  He then urinated in the Seclusion room.  He reported that he would shoot himself if he was discharged").    EXAM: calm, cooperative initially - Starts off with "I want to jump off a bridge. Admit me." Patient expectantly looking into camera as if awaiting an immediate offer of admission  to inpatient psychiatry. Initially saying h has never seen a psychiatrist before and then says "I guess" when asked if he was the same Mr Calhoun who just got discharged from  [facility] 3 weeks ago. Patient admits that he never went to the [place] [facility] outpatient program he was referred to and admitted that he "never took" his discharge medications as "they didn't work." Patient then states that his issue is that he has back pain and needs Neurontin and pain medications and thus his PCP Dr Willams gives hum Neurontin, Prozac and Seroquel which he prefers. Pt gets visibly annoyed at having to answer questions and the interview progresses. Pt's history of inpatient admissions, minimal cooperation with theraputic milieu and consistent "throwing away" of his discharge medications and refusal to go to recommended and scheduled outpatient program/services was reviewed with him. Patient at this point scowling at camera and said "I wish there was a real person I could talk to." Patient directly asked how readmission to inpatient psychiatry would be different at this time / how it would help him this time around - Patient said he did not know and said "maybe I will feel differently." Patient not able to elaborate what his treatment goals would be and what he would do differently. Patient said he is ok to "just waiting 3 days in the Emergency Room" as well when informed of the wait for inpt beds due to COVID outbreak. Pt thus offered to consider going to an inpatient substance abuse rehab given his history which Pt outright refused and in a threatening tone of voice said "If I do, I will kill myself" and started into the camera. PATIENT HAS ANOTHER [facility] MRN # [id number]: Patient is a 45 yo  male, single, noncaregiver, unemployed (states he has worked as a  6 months ago), undomiciled and was living at [facility] late October/early November of this year which he left due to continued substance use, with subsequent going in/out of medical facilities in what is suspected to be in order to avoid homeless shelters with chronologically correlating ED visits verbalizing suicidal ideation and request for admission with long charted hx of "throwing away" medications on discharge and not following up on outpatient referrals; + long term, continuous Polysubstance Abuse (12 beers q2 days; no known hx of complicated withdrawals), Cocaine Abuse (UTOX positive on last several D visits; was not done today at  ED), + hx of arrest for drug possession; extensive inpatient psychiatric documentation outlining Antisocial Personality traits and intentional acting out behaviors including threats when he was close to discharge (ie. ..."in his room, he became agitated and violent at night and needed IM prns and seclusion.  He flipped a table and threatened to punch his roommate.  He then urinated in the Seclusion room.  He reported that he would shoot himself if he was discharged"), with no known hx of actual suicide attempts,     EXAM: calm, cooperative initially - Starts off with "I want to jump off a bridge. Admit me." Patient expectantly looking into camera as if awaiting an immediate offer of admission  to inpatient psychiatry. Initially saying h has never seen a psychiatrist before and then says "I guess" when asked if he was the same Mr Calhoun who just got discharged from  [facility] 3 weeks ago. Patient admits that he never went to the [place] [facility] outpatient program he was referred to and admitted that he "never took" his discharge medications as "they didn't work." Patient then states that his issue is that he has back pain and needs Neurontin and pain medications and thus his PCP Dr Willams gives hum Neurontin, Prozac and Seroquel which he prefers and feels like "this combo is working better". Pt gets visibly annoyed at having to answer questions and the interview progresses. Pt's history of inpatient admissions, minimal cooperation with theraputic milieu and consistent "throwing away" of his discharge medications and refusal to go to recommended and scheduled outpatient program/services was reviewed with him. Patient at this point scowling at camera and said "I wish there was a real person I could talk to." Patient directly asked how readmission to inpatient psychiatry would be different at this time / how it would help him this time around , as well as what he was looking for as he reported that Dr Willams's medication combination is working bettr than what Aidan montoya him- Patient said he did not know and said "maybe I will feel differently." Patient not able to elaborate what his treatment goals would be and what he would do differently. Patient said he is ok to "just waiting 3 days in the Emergency Room" as well when informed of the wait for inpt beds due to COVID outbreak. Pt thus offered to consider going to an inpatient substance abuse rehab given his history which Pt outright refused and in a threatening tone of voice said "If I do, I will kill myself" and started into the camera. Patient then was asked to elaborate on what his symptoms as to mood, anxiety - at this point in time, the camera seemed to have frozen / turned off. Writer re-accessed the Telepsych cart immediately and Patient was no longer in the room. It is highly likely Pt walked out of the room.    COLLATERAL: none. Patient said he lives alone and did no have any friends or family whose name and number he could provide PATIENT HAS ANOTHER [facility] MRN # [id number]: Patient is a 47 yo  male, single, noncaregiver, unemployed (states he has worked as a  6 months ago), undomiciled and was living at [facility] late October/early November of this year which he left due to continued substance use, with subsequent going in/out of medical facilities in what is suspected to be in order to avoid homeless shelters with chronologically correlating ED visits verbalizing suicidal ideation and request for admission with long charted hx of "throwing away" medications on discharge and not following up on outpatient referrals; multiple prior inpt psych admissions (most recently [facility] 11/24/21-12/3/21), + long term, continuous Polysubstance Abuse (12 beers q2 days; no known hx of complicated withdrawals), Cocaine Abuse (UTOX positive on last several D visits; was not done today at  ED), + hx of arrest for drug possession; extensive inpatient psychiatric documentation outlining Antisocial Personality traits and intentional acting out behaviors including threats when he was close to discharge (ie. ..."in his room, he became agitated and violent at night and needed IM prns and seclusion.  He flipped a table and threatened to punch his roommate.  He then urinated in the Seclusion room.  He reported that he would shoot himself if he was discharged"), with no known hx of actual suicide attempts,     EXAM: calm, cooperative initially - Starts off with "I want to jump off a bridge. Admit me." Patient expectantly looking into camera as if awaiting an immediate offer of admission  to inpatient psychiatry. Initially saying h has never seen a psychiatrist before and then says "I guess" when asked if he was the same Mr Calhoun who just got discharged from  [facility] 3 weeks ago. Patient admits that he never went to the [place] [facility] outpatient program he was referred to and admitted that he "never took" his discharge medications as "they didn't work." Patient then states that his issue is that he has back pain and needs Neurontin and pain medications and thus his PCP Dr Willams gives hum Neurontin, Prozac and Seroquel which he prefers and feels like "this combo is working better". Pt gets visibly annoyed at having to answer questions and the interview progresses. Pt's history of inpatient admissions, minimal cooperation with theraputic milieu and consistent "throwing away" of his discharge medications and refusal to go to recommended and scheduled outpatient program/services was reviewed with him. Patient at this point scowling at camera and said "I wish there was a real person I could talk to." Patient directly asked how readmission to inpatient psychiatry would be different at this time / how it would help him this time around , as well as what he was looking for as he reported that Dr Willams's medication combination is working bettr than what Aidan montoya him- Patient said he did not know and said "maybe I will feel differently." Patient not able to elaborate what his treatment goals would be and what he would do differently. Patient said he is ok to "just waiting 3 days in the Emergency Room" as well when informed of the wait for inpt beds due to COVID outbreak. Pt thus offered to consider going to an inpatient substance abuse rehab given his history which Pt outright refused and in a threatening tone of voice said "If I do, I will kill myself" and started into the camera. Also said that the interview is an "interrogation." Patient then was asked to elaborate on what his symptoms as to mood, anxiety - at this point in time, the camera seemed to have frozen / turned off. Writer re-accessed the Telepsych cart immediately and Patient was no longer in the room. It is highly likely Pt walked out of the room.    COLLATERAL: none. Patient said he lives alone and did no have any friends or family whose name and number he could provide PATIENT HAS ANOTHER [facility] MRN # [id number]: Patient is a 47 yo  male, single, noncaregiver, unemployed (states he has worked as a  6 months ago), undomiciled for years and was living at [facility] late October/early November of this year which he left due to continued substance use, with subsequent going in/out of medical facilities in what is suspected to be in order to avoid homeless shelters with chronologically correlating ED visits verbalizing suicidal ideation and request for admission with long charted hx of "throwing away" medications on discharge and not following up on outpatient referrals; multiple prior inpt psych admissions (most recently [facility] 11/24/21-12/3/21), + long term, continuous Polysubstance Abuse (12 beers q2 days; no known hx of complicated withdrawals), Cocaine Abuse (UTOX positive on last several D visits; was not done today at  ED), + hx of arrest for drug possession; extensive inpatient psychiatric documentation outlining Antisocial Personality traits and intentional acting out behaviors including threats when he was close to discharge (ie. ..."in his room, he became agitated and violent at night and needed IM prns and seclusion.  He flipped a table and threatened to punch his roommate.  He then urinated in the Seclusion room.  He reported that he would shoot himself if he was discharged"), with no known hx of actual suicide attempts,     EXAM: calm, cooperative initially - Starts off with "I want to jump off a bridge. Admit me." Patient expectantly looking into camera as if awaiting an immediate offer of admission  to inpatient psychiatry. Initially saying h has never seen a psychiatrist before and then says "I guess" when asked if he was the same Mr Calhoun who just got discharged from  [facility] 3 weeks ago. Patient admits that he never went to the [place] [facility] outpatient program he was referred to and admitted that he "never took" his discharge medications as "they didn't work." Patient then states that his issue is that he has back pain and needs Neurontin and pain medications and thus his PCP Dr Willams gives hum Neurontin, Prozac and Seroquel which he prefers and feels like "this combo is working better". Pt gets visibly annoyed at having to answer questions and the interview progresses. Pt's history of inpatient admissions, minimal cooperation with theraputic milieu and consistent "throwing away" of his discharge medications and refusal to go to recommended and scheduled outpatient program/services was reviewed with him. Patient at this point scowling at camera and said "I wish there was a real person I could talk to." Patient directly asked how readmission to inpatient psychiatry would be different at this time / how it would help him this time around , as well as what he was looking for as he reported that Dr Willams's medication combination is working bettr than what Aidan montoya him- Patient said he did not know and said "maybe I will feel differently." Patient not able to elaborate what his treatment goals would be and what he would do differently. Patient said he is ok to "just waiting 3 days in the Emergency Room" as well when informed of the wait for inpt beds due to COVID outbreak. Pt thus offered to consider going to an inpatient substance abuse rehab given his history which Pt outright refused and in a threatening tone of voice said "If I do, I will kill myself" and started into the camera. Also said that the interview is an "interrogation." Patient then was asked to elaborate on what his symptoms as to mood, anxiety - at this point in time, the camera seemed to have frozen / turned off. Writer re-accessed the Telepsych cart immediately and Patient was no longer in the room. It is highly likely Pt walked out of the room.    COLLATERAL: none. Patient said he lives alone and did no have any friends or family whose name and number he could provide PATIENT HAS ANOTHER [facility] MRN # [id number]: Patient is a 45 yo  male, single, noncaregiver, unemployed (states he has worked as a  6 months ago), undomiciled for years and was living at [facility] late October/early November of this year which he left due to continued substance use, with subsequent going in/out of medical facilities in what is suspected to be in order to avoid homeless shelters with chronologically correlating ED visits verbalizing suicidal ideation and request for admission with long charted hx of "throwing away" medications on discharge and not following up on outpatient referrals; multiple prior inpt psych admissions (most recently [facility] 11/24/21-12/3/21), + long term, continuous Polysubstance Abuse (12 beers q2 days; no known hx of complicated withdrawals), Cocaine Abuse (UTOX positive on last several D visits; was not done today at  ED), + hx of arrest for drug possession; extensive inpatient psychiatric documentation outlining Antisocial Personality traits and intentional acting out behaviors including threats when he was close to discharge (ie. ..."in his room, he became agitated and violent at night and needed IM prns and seclusion.  He flipped a table and threatened to punch his roommate.  He then urinated in the Seclusion room.  He reported that he would shoot himself if he was discharged"), with no known hx of actual suicide attempts. At this time, Patient self-presented to the  ED last night "presenting to the ED today S/P a fall. Patient endorses that he was drinking, after which he stumbled on a street curb causing him to fall backwards and hit the right side of the back of his head on the ground." This morning as he was prepared for discharge, he told staff that he wants to kill himself. Telepsychiatry called.     EXAM: calm, cooperative initially - Starts off with "I want to jump off a bridge. Admit me." Patient expectantly looking into camera as if awaiting an immediate offer of admission  to inpatient psychiatry. Initially saying h has never seen a psychiatrist before and then says "I guess" when asked if he was the same Mr Calhoun who just got discharged from  [facility] 3 weeks ago. Patient admits that he never went to the [place] [facility] outpatient program he was referred to and admitted that he "never took" his discharge medications as "they didn't work." Patient then states that his issue is that he has back pain and needs Neurontin and pain medications and thus his PCP Dr Willams gives hum Neurontin, Prozac and Seroquel which he prefers and feels like "this combo is working better". Pt gets visibly annoyed at having to answer questions and the interview progresses. Pt's history of inpatient admissions, minimal cooperation with theraputic milieu and consistent "throwing away" of his discharge medications and refusal to go to recommended and scheduled outpatient program/services was reviewed with him. Patient at this point scowling at camera and said "I wish there was a real person I could talk to." Patient directly asked how readmission to inpatient psychiatry would be different at this time / how it would help him this time around , as well as what he was looking for as he reported that Dr Willams's medication combination is working bettr than what Aidan montoya him- Patient said he did not know and said "maybe I will feel differently." Patient not able to elaborate what his treatment goals would be and what he would do differently. Patient said he is ok to "just waiting 3 days in the Emergency Room" as well when informed of the wait for inpt beds due to COVID outbreak. Pt thus offered to consider going to an inpatient substance abuse rehab given his history which Pt outright refused and in a threatening tone of voice said "If I do, I will kill myself" and started into the camera. Also said that the interview is an "interrogation." Patient then was asked to elaborate on what his symptoms as to mood, anxiety - at this point in time, the camera seemed to have frozen / turned off. Writer re-accessed the Telepsych cart immediately and Patient was no longer in the room. It is highly likely Pt walked out of the room.    COLLATERAL: none. Patient said he lives alone and did no have any friends or family whose name and number he could provide PATIENT HAS ANOTHER [facility] MRN # [id number]: Patient is a 47 yo  male, single, noncaregiver, unemployed (states he has worked as a  6 months ago), undomiciled for years and was living at [facility] late October/early November of this year which he left due to continued substance use, with subsequent going in/out of medical facilities in what is suspected to be in order to avoid homeless shelters with chronologically correlating ED visits verbalizing suicidal ideation and request for admission with long charted hx of "throwing away" medications on discharge and not following up on outpatient referrals; multiple prior inpt psych admissions (most recently [facility] 11/24/21-12/3/21), + long term, continuous Polysubstance Abuse (12 beers q2 days; no known hx of complicated withdrawals), Cocaine Abuse (UTOX positive on last several D visits; was not done today at  ED), + hx of arrest for drug possession; extensive inpatient psychiatric documentation outlining Antisocial Personality traits and intentional acting out behaviors including threats when he was close to discharge (ie. ..."in his room, he became agitated and violent at night and needed IM prns and seclusion.  He flipped a table and threatened to punch his roommate.  He then urinated in the Seclusion room.  He reported that he would shoot himself if he was discharged"), with no known hx of actual suicide attempts. At this time, Patient self-presented to the  ED last night "presenting to the ED today S/P a fall. Patient endorses that he was drinking, after which he stumbled on a street curb causing him to fall backwards and hit the right side of the back of his head on the ground." This morning as he was prepared for discharge, he told staff that he wants to kill himself. Telepsychiatry called.     EXAM: calm, cooperative initially - Starts off with "I want to jump off a bridge. Admit me." Patient expectantly looking into camera as if awaiting an immediate offer of admission  to inpatient psychiatry. Initially saying h has never seen a psychiatrist before and then says "I guess" when asked if he was the same Mr Calhoun who just got discharged from  [facility] 3 weeks ago. Patient admits that he never went to the [place] [facility] outpatient program he was referred to and admitted that he "never took" his discharge medications as "they didn't work." Patient then states that his issue is that he has back pain and needs Neurontin and pain medications and thus his PCP Dr Willams gives hum Neurontin, Prozac and Seroquel which he prefers and feels like "this combo is working better." (Of note; Dr Nunez is also prescribing benzos for Patient and has for > 1 year). Pt gets visibly annoyed at having to answer questions and the interview progresses. Pt's history of inpatient admissions, minimal cooperation with theraputic milieu and consistent "throwing away" of his discharge medications and refusal to go to recommended and scheduled outpatient program/services was reviewed with him. Patient at this point scowling at camera and said "I wish there was a real person I could talk to." Patient directly asked how readmission to inpatient psychiatry would be different at this time / how it would help him this time around , as well as what he was looking for as he reported that Dr Willams's medication combination is working bettr than what Aidan montoya him- Patient said he did not know and said "maybe I will feel differently." Patient not able to elaborate what his treatment goals would be and what he would do differently. Patient said he is ok to "just waiting 3 days in the Emergency Room" as well when informed of the wait for inpt beds due to COVID outbreak. Pt thus offered to consider going to an inpatient substance abuse rehab given his history which Pt outright refused and in a threatening tone of voice said "If I do, I will kill myself" and started into the camera. Also said that the interview is an "interrogation." Patient then was asked to elaborate on what his symptoms as to mood, anxiety - at this point in time, the camera seemed to have frozen / turned off. Writer re-accessed the Telepsych cart immediately and Patient was no longer in the room. It is highly likely Pt walked out of the room.    COLLATERAL: none. Patient said he lives alone and did no have any friends or family whose name and number he could provide  ISTOP reference [id number]   filled Klonopin 1mg po TID #90 for 30 days on 12/4/21 RX by Dr Bethel Willams   filled Valium 10mg PO TID #90 days for 30 days on 8/20/21

## 2021-12-25 NOTE — ED BEHAVIORAL HEALTH ASSESSMENT NOTE - SUMMARY
History and presentation consistent with Patient's well documented history of ongoing Polysubstance Abuse, Antisocial Personality Disorder with associated behaviors, with going in/out of medical facilities in what is suspected to be in order to avoid homeless shelters with chronologically correlating ED visits verbalizing suicidal ideation and request for admission,  long charted hx of "throwing away" medications on discharge and not following up on outpatient referrals; multiple prior inpt psych admissions (most recently [facility] 11/24/21-12/3/21), who initially presented as a fall due to intoxication and when it came to discharge from the ED, the reported suicidal ideation. MSE and overall clinical presentation and behavior demonstrated during exam consistent with Pt's baseline including malingering.   Would not benefit from return to inpatient psychiatry. Would benefit from going to substance rehab which Pt was offered and he refused. Discharge. Discussed with Dr Polk.

## 2021-12-25 NOTE — ED PROVIDER NOTE - PROGRESS NOTE DETAILS
xr chest neg to my eye. technical trouble saving wet read. steady gait, ct neg. feeling ok and wants to go home, reviewed case and results w pt, questions answered and pt understands, advised return precautions and care plan. pt reports si / wants to throw self in front of train. will check labs / psychiatry [facility], will see pt promptly. as being discharged. pt reports si / wants to throw self in front of train. will check labs / psychiatry pt seen by telepsych. they state pt is at his baseline, has personality disorder and may be malingering, that today his risk of self harm is no more than any other day and he recently was admitted to psychiatry and did not follow up afterward. for these reasons while he may be at some risk of self harm, he would not benefit from acute hospitalization.

## 2021-12-25 NOTE — ED ADULT NURSE REASSESSMENT NOTE - NS ED NURSE REASSESS COMMENT FT1
Patient stated that he feels suicidal. Reports that he broke up with his girlfriend last night and is feeling depressed. He states that his plan is to throw himself in front of moving train. Asked if fall last night is related to self-injurious thoughts, he denies this. Patient stated that he feels suicidal. Reports that he broke up with his girlfriend last night and is feeling depressed. He states that his plan is to throw himself in front of moving train. Asked if fall last night is related to self-injurious thoughts, he denies this. Dr. Polk notified immediately.

## 2021-12-25 NOTE — ED PROVIDER NOTE - NSFOLLOWUPINSTRUCTIONS_ED_ALL_ED_FT
IMPORTANT INSTRUCTIONS FROM Dr. MELENDEZ:    Please follow up with your personal medical doctor in 24-48 hours.   Bring results from today to your visit.    If you were advised to take any medications - be sure to review the package insert.    If your symptoms change, get worse or if you have any new symptoms, come to the ER right away.  If you have any questions, call the ER at the phone number on this page. IMPORTANT INSTRUCTIONS FROM Dr. MELENDEZ:    Please follow up with your personal medical doctor in 24-48 hours. See your psychiatrist within a few days.  Bring results from today to your visit.    If you were advised to take any medications - be sure to review the package insert.    If your symptoms change, get worse or if you have any new symptoms, come to the ER right away.  If you have any questions, call the ER at the phone number on this page.

## 2021-12-25 NOTE — ED BEHAVIORAL HEALTH ASSESSMENT NOTE - CURRENT MEDICATION
Prozac, Neurontin, Seroquel Prozac, Neurontin, Seroquel; MALENA FRANCES meds 12/3/21: Abilify 10mg po qd, Cogentin 1mg po qhs, gabapentin 400mg po tid, melatonin 5mg poqhs, Seroquel 50mg PO q6hrs PRN

## 2021-12-25 NOTE — ED BEHAVIORAL HEALTH ASSESSMENT NOTE - DETAILS
[facility]ROSE from [facility] 12/3/21 all notes from inpatient Psychiatrist and Treatment Team reviewed as per records, hx of acting out aggressive behavior on inpatient units when he is close to discharge or does not get what he asked for Pt refused to cooperative self referred see HPI; long hx of making suicidal threats in medical settings with high index of suspicion in order to gain admission earlier charts report Pt's brother  of a drug overdose

## 2021-12-28 NOTE — BH INPATIENT PSYCHIATRY PROGRESS NOTE - NSTREATMENTCERTY_PSY_ALL_CORE
Initial /CM Assessment/Plan of Care Note     Baseline Assessment  64 year old female admitted 12/27/2021 as Observation with a diagnosis of Drug-induced Parkinsonianism.   Prior to admission patient was living with Spouse/significant other and residing at House.  Patient does not  have a Power of  for Healthcare.  Patient’s Primary Care Provider is Taylor Maria NP.     Medical History  Past Medical History:   Diagnosis Date   • ADHD (attention deficit hyperactivity disorder)    • Anxiety    • Insomnia    • Pulmonary embolism (CMS/HCC)        Prior to Admission Status       Agency/Support  Type of Services Prior to Hospitalization: PT  Support Systems: Spouse/Significant other  Home Devices/Equipment: None  Mobility Assist Devices: Wheelchair  Sensory Support Devices: None    Current Status  PT Ambulation Tips:    PT Transfer Tips:     OT Bathing Tips:    OT Dressing Tips:    OT Toileting Tips:    OT Feeding Tips:    SLP Swallow/Feeding Tips:    SLP Comm/Cog Tips:    Current Mental Status:    Stressors:      Insurance  Primary: MEDICARE  Secondary: N/A    Barriers to Discharge  Identified Barriers to Discharge/Transition Planning:      Progress Note  Patient with  history of drug-induced parkinsonianism presenting with worsening dysphagia.  She was at [facility] from 12/7-12/12 for similar complaints of dysphagia.  At the time of discharge she was documented that she was tolerating liquids and pills.  In the intervening 2 weeks, she has lost her ability to swallow any solids and has difficulty with fluids.  She has worsening tremor and is no longer walking.  She developed parkinsonianism while on aripiprazole which was discontinued a few months back.  She has been taking benztropine in an effort to control symptoms.  Consulted her neurologist at [facility], Dr. Tracey Dc, and he recommends IV benztropine. TAP line called to initiate transfer.  Speech therapy evaluation with consideration for 
ENT or GI consultation. Continue amantadine.  Acute renal failure with hypokalemia - IV fluid hydration with electrolyte supplementation.      Addendum:   Attempt to meet with patient regarding discharge planning. Patient is somnolent, with jerking type movements of body.   Failed swallow evaluation today. Plan to place NG and start enteral nutrition tomorrow if no improvement in swallowing. Continues on IV benztropine mesylate(Cogentin) IV neurology recommendation. Plan remains transfer to [facility] if bed avaiable.  Continue amantadine when taking pills.    Met briefly with [facility] Matias, reports he has been providing total care for patient and is becoming draining. Plan would be for [facility] upon discharge. Will continue discussion on choice of SNF when appropriate.     Plan  SW/CM - Recommendations for Discharge:     PT - Recommendations for Discharge:      OT - Recommendations for Discharge:      SLP - Recommendations for Discharge: Post acute therapy   Anticipate patient will need post-hospital services. Necessary services are available.  Anticipate patient can return to the environment from which patient entered the hospital.   Anticipate patient cannot provide self-care at discharge.    Refer to SW/CM Flowsheet for Goals and objective data.     
That inpatient services furnished since the previous certification or recertification were, and continue to be required: for treatment that could reasonably be expected to improve the patient's condition; or, for diagnostic study;    That the hospital records show that the services furnished were: intensive treatment services; admission and related services necessary for diagnostic study or equivalent services;    That the patient continues to need, on a daily basis active inpatient treatment furnished directly by or requiring the supervision of inpatient psychiatric facility personnel.

## 2022-01-04 NOTE — SOCIAL WORK POST DISCHARGE FOLLOW UP NOTE - NSBHSWFOLLOWUP_PSY_ALL_CORE_FT
SW attempted to contact pt to reschedule appointment but patient did not answer. At the time of dc the treatment team determined the patient was not a risk to themselves or others. This case is closed.

## 2022-02-04 NOTE — ED PROVIDER NOTE - TEMPLATE
Chart reviewed. Patient seen seated in chair in PT/OT preoperative assessment room with no apparent distress. Patient underwent pre-operative consultation to determine current functional mobility limitations to determine appropriate need for assistive devices. Fall

## 2022-02-10 ENCOUNTER — HOSPITAL ENCOUNTER (INPATIENT)
Dept: HOSPITAL 74 - YASAS | Age: 47
LOS: 6 days | Discharge: LEFT BEFORE DISCHARGE | DRG: 894 | End: 2022-02-16
Attending: ALLERGY & IMMUNOLOGY | Admitting: ALLERGY & IMMUNOLOGY
Payer: COMMERCIAL

## 2022-02-10 VITALS — BODY MASS INDEX: 35.5 KG/M2

## 2022-02-10 DIAGNOSIS — G62.9: ICD-10-CM

## 2022-02-10 DIAGNOSIS — M54.50: ICD-10-CM

## 2022-02-10 DIAGNOSIS — I10: ICD-10-CM

## 2022-02-10 DIAGNOSIS — F19.24: ICD-10-CM

## 2022-02-10 DIAGNOSIS — M54.2: ICD-10-CM

## 2022-02-10 DIAGNOSIS — R74.01: ICD-10-CM

## 2022-02-10 DIAGNOSIS — G47.00: ICD-10-CM

## 2022-02-10 DIAGNOSIS — F10.20: Primary | ICD-10-CM

## 2022-02-10 DIAGNOSIS — G89.29: ICD-10-CM

## 2022-02-10 DIAGNOSIS — F32.9: ICD-10-CM

## 2022-02-10 DIAGNOSIS — K21.9: ICD-10-CM

## 2022-02-10 DIAGNOSIS — F17.210: ICD-10-CM

## 2022-02-10 PROCEDURE — U0003 INFECTIOUS AGENT DETECTION BY NUCLEIC ACID (DNA OR RNA); SEVERE ACUTE RESPIRATORY SYNDROME CORONAVIRUS 2 (SARS-COV-2) (CORONAVIRUS DISEASE [COVID-19]), AMPLIFIED PROBE TECHNIQUE, MAKING USE OF HIGH THROUGHPUT TECHNOLOGIES AS DESCRIBED BY CMS-2020-01-R: HCPCS

## 2022-02-10 PROCEDURE — C9803 HOPD COVID-19 SPEC COLLECT: HCPCS

## 2022-02-10 PROCEDURE — U0005 INFEC AGEN DETEC AMPLI PROBE: HCPCS

## 2022-02-10 PROCEDURE — G0008 ADMIN INFLUENZA VIRUS VAC: HCPCS

## 2022-02-10 PROCEDURE — G0009 ADMIN PNEUMOCOCCAL VACCINE: HCPCS

## 2022-02-10 RX ADMIN — HYDROXYZINE PAMOATE SCH: 25 CAPSULE ORAL at 23:44

## 2022-02-10 RX ADMIN — Medication SCH: at 23:44

## 2022-02-10 RX ADMIN — Medication SCH: at 23:43

## 2022-02-10 RX ADMIN — Medication SCH: at 23:45

## 2022-02-10 RX ADMIN — NICOTINE SCH: 14 PATCH, EXTENDED RELEASE TRANSDERMAL at 23:43

## 2022-02-11 LAB
ALBUMIN SERPL-MCNC: 3.6 G/DL (ref 3.4–5)
ALP SERPL-CCNC: 77 U/L (ref 45–117)
ALT SERPL-CCNC: 32 U/L (ref 13–61)
ANION GAP SERPL CALC-SCNC: 4 MMOL/L (ref 8–16)
AST SERPL-CCNC: 15 U/L (ref 15–37)
BILIRUB SERPL-MCNC: 0.6 MG/DL (ref 0.2–1)
BUN SERPL-MCNC: 13.1 MG/DL (ref 7–18)
CALCIUM SERPL-MCNC: 8.7 MG/DL (ref 8.5–10.1)
CHLORIDE SERPL-SCNC: 111 MMOL/L (ref 98–107)
CO2 SERPL-SCNC: 28 MMOL/L (ref 21–32)
CREAT SERPL-MCNC: 1 MG/DL (ref 0.55–1.3)
DEPRECATED RDW RBC AUTO: 13.9 % (ref 11.9–15.9)
GLUCOSE SERPL-MCNC: 97 MG/DL (ref 74–106)
HCT VFR BLD CALC: 38.8 % (ref 35.4–49)
HGB BLD-MCNC: 13.5 GM/DL (ref 11.7–16.9)
HIV 1+2 AB+HIV1 P24 AG SERPL QL IA: NEGATIVE
MCH RBC QN AUTO: 29.7 PG (ref 25.7–33.7)
MCHC RBC AUTO-ENTMCNC: 34.8 G/DL (ref 32–35.9)
MCV RBC: 85.5 FL (ref 80–96)
PLATELET # BLD AUTO: 185 10^3/UL (ref 134–434)
PMV BLD: 9.4 FL (ref 7.5–11.1)
PROT SERPL-MCNC: 6.5 G/DL (ref 6.4–8.2)
RBC # BLD AUTO: 4.54 M/MM3 (ref 4–5.6)
SODIUM SERPL-SCNC: 142 MMOL/L (ref 136–145)
WBC # BLD AUTO: 4.2 K/MM3 (ref 4–10)

## 2022-02-11 PROCEDURE — HZ2ZZZZ DETOXIFICATION SERVICES FOR SUBSTANCE ABUSE TREATMENT: ICD-10-PCS | Performed by: ALLERGY & IMMUNOLOGY

## 2022-02-11 RX ADMIN — IBUPROFEN PRN MG: 400 TABLET, FILM COATED ORAL at 15:31

## 2022-02-11 RX ADMIN — GABAPENTIN SCH MG: 300 CAPSULE ORAL at 15:31

## 2022-02-11 RX ADMIN — QUETIAPINE FUMARATE SCH MG: 100 TABLET ORAL at 22:10

## 2022-02-11 RX ADMIN — HYDROXYZINE PAMOATE SCH MG: 25 CAPSULE ORAL at 10:15

## 2022-02-11 RX ADMIN — HYDROXYZINE PAMOATE SCH MG: 25 CAPSULE ORAL at 15:32

## 2022-02-11 RX ADMIN — HYDROXYZINE PAMOATE SCH MG: 25 CAPSULE ORAL at 17:28

## 2022-02-11 RX ADMIN — Medication SCH MG: at 22:09

## 2022-02-11 RX ADMIN — NICOTINE SCH: 14 PATCH, EXTENDED RELEASE TRANSDERMAL at 10:15

## 2022-02-11 RX ADMIN — HYDROXYZINE PAMOATE SCH MG: 25 CAPSULE ORAL at 06:45

## 2022-02-11 RX ADMIN — HYDROXYZINE PAMOATE SCH MG: 25 CAPSULE ORAL at 22:09

## 2022-02-11 RX ADMIN — Medication SCH TAB: at 10:15

## 2022-02-11 RX ADMIN — GABAPENTIN SCH MG: 300 CAPSULE ORAL at 22:10

## 2022-02-12 RX ADMIN — Medication SCH MG: at 22:03

## 2022-02-12 RX ADMIN — HYDROXYZINE PAMOATE SCH MG: 25 CAPSULE ORAL at 10:21

## 2022-02-12 RX ADMIN — GABAPENTIN SCH MG: 300 CAPSULE ORAL at 05:55

## 2022-02-12 RX ADMIN — METHOCARBAMOL PRN MG: 500 TABLET ORAL at 22:03

## 2022-02-12 RX ADMIN — Medication SCH TAB: at 10:21

## 2022-02-12 RX ADMIN — NICOTINE POLACRILEX PRN MG: 4 GUM, CHEWING ORAL at 17:45

## 2022-02-12 RX ADMIN — IBUPROFEN PRN MG: 400 TABLET, FILM COATED ORAL at 17:46

## 2022-02-12 RX ADMIN — GABAPENTIN SCH MG: 300 CAPSULE ORAL at 22:03

## 2022-02-12 RX ADMIN — HYDROXYZINE PAMOATE SCH MG: 25 CAPSULE ORAL at 13:31

## 2022-02-12 RX ADMIN — HYDROXYZINE PAMOATE SCH MG: 25 CAPSULE ORAL at 17:46

## 2022-02-12 RX ADMIN — HYDROXYZINE PAMOATE SCH MG: 25 CAPSULE ORAL at 05:55

## 2022-02-12 RX ADMIN — QUETIAPINE FUMARATE SCH MG: 100 TABLET ORAL at 22:03

## 2022-02-12 RX ADMIN — HYDROXYZINE PAMOATE SCH MG: 25 CAPSULE ORAL at 22:03

## 2022-02-12 RX ADMIN — GABAPENTIN SCH MG: 300 CAPSULE ORAL at 13:31

## 2022-02-13 RX ADMIN — HYDROXYZINE PAMOATE SCH MG: 25 CAPSULE ORAL at 17:28

## 2022-02-13 RX ADMIN — NICOTINE PRN MG: 4 INHALANT RESPIRATORY (INHALATION) at 17:30

## 2022-02-13 RX ADMIN — NICOTINE POLACRILEX PRN MG: 4 GUM, CHEWING ORAL at 14:12

## 2022-02-13 RX ADMIN — NICOTINE POLACRILEX PRN MG: 4 GUM, CHEWING ORAL at 10:11

## 2022-02-13 RX ADMIN — HYDROXYZINE PAMOATE SCH MG: 25 CAPSULE ORAL at 10:10

## 2022-02-13 RX ADMIN — GABAPENTIN SCH MG: 300 CAPSULE ORAL at 22:11

## 2022-02-13 RX ADMIN — IBUPROFEN PRN MG: 400 TABLET, FILM COATED ORAL at 14:13

## 2022-02-13 RX ADMIN — QUETIAPINE FUMARATE SCH MG: 100 TABLET ORAL at 22:11

## 2022-02-13 RX ADMIN — HYDROXYZINE PAMOATE SCH MG: 25 CAPSULE ORAL at 14:11

## 2022-02-13 RX ADMIN — ACETAMINOPHEN PRN MG: 325 TABLET ORAL at 17:31

## 2022-02-13 RX ADMIN — GABAPENTIN SCH MG: 300 CAPSULE ORAL at 05:53

## 2022-02-13 RX ADMIN — Medication SCH MG: at 22:09

## 2022-02-13 RX ADMIN — NICOTINE POLACRILEX PRN MG: 4 GUM, CHEWING ORAL at 17:29

## 2022-02-13 RX ADMIN — IBUPROFEN PRN MG: 400 TABLET, FILM COATED ORAL at 22:10

## 2022-02-13 RX ADMIN — IBUPROFEN PRN MG: 400 TABLET, FILM COATED ORAL at 05:54

## 2022-02-13 RX ADMIN — HYDROXYZINE PAMOATE SCH MG: 25 CAPSULE ORAL at 05:52

## 2022-02-13 RX ADMIN — HYDROXYZINE PAMOATE SCH MG: 25 CAPSULE ORAL at 22:11

## 2022-02-13 RX ADMIN — GABAPENTIN SCH MG: 300 CAPSULE ORAL at 14:11

## 2022-02-13 RX ADMIN — METHOCARBAMOL PRN MG: 500 TABLET ORAL at 22:09

## 2022-02-13 RX ADMIN — Medication SCH TAB: at 10:10

## 2022-02-14 PROCEDURE — HZ42ZZZ GROUP COUNSELING FOR SUBSTANCE ABUSE TREATMENT, COGNITIVE-BEHAVIORAL: ICD-10-PCS | Performed by: ALLERGY & IMMUNOLOGY

## 2022-02-14 RX ADMIN — Medication SCH MG: at 21:10

## 2022-02-14 RX ADMIN — QUETIAPINE FUMARATE SCH MG: 100 TABLET ORAL at 21:11

## 2022-02-14 RX ADMIN — NICOTINE PRN MG: 4 INHALANT RESPIRATORY (INHALATION) at 17:38

## 2022-02-14 RX ADMIN — HYDROXYZINE PAMOATE SCH MG: 25 CAPSULE ORAL at 21:11

## 2022-02-14 RX ADMIN — GABAPENTIN SCH MG: 300 CAPSULE ORAL at 14:27

## 2022-02-14 RX ADMIN — IBUPROFEN PRN MG: 400 TABLET, FILM COATED ORAL at 21:11

## 2022-02-14 RX ADMIN — HYDROXYZINE PAMOATE SCH MG: 25 CAPSULE ORAL at 14:26

## 2022-02-14 RX ADMIN — GABAPENTIN SCH MG: 300 CAPSULE ORAL at 05:39

## 2022-02-14 RX ADMIN — ACETAMINOPHEN PRN MG: 325 TABLET ORAL at 13:02

## 2022-02-14 RX ADMIN — GABAPENTIN SCH MG: 300 CAPSULE ORAL at 21:11

## 2022-02-14 RX ADMIN — HYDROXYZINE PAMOATE SCH MG: 25 CAPSULE ORAL at 17:34

## 2022-02-14 RX ADMIN — HYDROXYZINE PAMOATE SCH MG: 25 CAPSULE ORAL at 10:14

## 2022-02-14 RX ADMIN — HYDROXYZINE PAMOATE SCH MG: 25 CAPSULE ORAL at 05:39

## 2022-02-14 RX ADMIN — Medication SCH TAB: at 10:32

## 2022-02-15 LAB
APPEARANCE UR: CLEAR
BILIRUB UR STRIP.AUTO-MCNC: NEGATIVE MG/DL
COLOR UR: YELLOW
KETONES UR QL STRIP: NEGATIVE
LEUKOCYTE ESTERASE UR QL STRIP.AUTO: NEGATIVE
NITRITE UR QL STRIP: NEGATIVE
PH UR: 5 [PH] (ref 5–8)
PROT UR QL STRIP: NEGATIVE
PROT UR QL STRIP: NEGATIVE
SP GR UR: 1.01 (ref 1.01–1.03)
UROBILINOGEN UR STRIP-MCNC: 0.2 MG/DL (ref 0.2–1)

## 2022-02-15 RX ADMIN — Medication SCH TAB: at 10:16

## 2022-02-15 RX ADMIN — NICOTINE POLACRILEX PRN MG: 4 GUM, CHEWING ORAL at 06:14

## 2022-02-15 RX ADMIN — HYDROXYZINE PAMOATE SCH MG: 25 CAPSULE ORAL at 18:22

## 2022-02-15 RX ADMIN — NICOTINE SCH MG: 14 PATCH, EXTENDED RELEASE TRANSDERMAL at 14:44

## 2022-02-15 RX ADMIN — HYDROXYZINE PAMOATE SCH MG: 25 CAPSULE ORAL at 13:51

## 2022-02-15 RX ADMIN — GABAPENTIN SCH MG: 300 CAPSULE ORAL at 21:05

## 2022-02-15 RX ADMIN — GABAPENTIN SCH MG: 300 CAPSULE ORAL at 13:51

## 2022-02-15 RX ADMIN — Medication SCH MG: at 21:05

## 2022-02-15 RX ADMIN — NICOTINE POLACRILEX PRN MG: 4 GUM, CHEWING ORAL at 10:19

## 2022-02-15 RX ADMIN — HYDROXYZINE PAMOATE SCH MG: 25 CAPSULE ORAL at 05:52

## 2022-02-15 RX ADMIN — HYDROXYZINE PAMOATE SCH MG: 25 CAPSULE ORAL at 21:05

## 2022-02-15 RX ADMIN — QUETIAPINE FUMARATE SCH MG: 100 TABLET ORAL at 21:05

## 2022-02-15 RX ADMIN — GABAPENTIN SCH MG: 300 CAPSULE ORAL at 05:52

## 2022-02-15 RX ADMIN — IBUPROFEN PRN MG: 400 TABLET, FILM COATED ORAL at 14:44

## 2022-02-15 RX ADMIN — NICOTINE POLACRILEX PRN MG: 4 GUM, CHEWING ORAL at 13:52

## 2022-02-15 RX ADMIN — IBUPROFEN PRN MG: 400 TABLET, FILM COATED ORAL at 21:06

## 2022-02-15 RX ADMIN — HYDROXYZINE PAMOATE SCH: 25 CAPSULE ORAL at 10:18

## 2022-02-16 VITALS — SYSTOLIC BLOOD PRESSURE: 135 MMHG | HEART RATE: 84 BPM | DIASTOLIC BLOOD PRESSURE: 82 MMHG

## 2022-02-16 VITALS — TEMPERATURE: 98.2 F

## 2022-02-16 RX ADMIN — HYDROXYZINE PAMOATE SCH MG: 25 CAPSULE ORAL at 06:05

## 2022-02-16 RX ADMIN — HYDROXYZINE PAMOATE SCH: 25 CAPSULE ORAL at 09:14

## 2022-02-16 RX ADMIN — Medication SCH: at 09:13

## 2022-02-16 RX ADMIN — NICOTINE SCH MG: 14 PATCH, EXTENDED RELEASE TRANSDERMAL at 09:12

## 2022-02-16 RX ADMIN — GABAPENTIN SCH MG: 300 CAPSULE ORAL at 06:05

## 2022-03-08 PROBLEM — F42.9 OBSESSIVE-COMPULSIVE DISORDER, UNSPECIFIED: Chronic | Status: ACTIVE | Noted: 2021-11-24

## 2022-03-08 PROBLEM — F25.9 SCHIZOAFFECTIVE DISORDER, UNSPECIFIED: Chronic | Status: ACTIVE | Noted: 2021-11-24

## 2022-04-07 ENCOUNTER — EMERGENCY (EMERGENCY)
Facility: HOSPITAL | Age: 47
LOS: 1 days | Discharge: HOME | End: 2022-04-07
Attending: EMERGENCY MEDICINE | Admitting: EMERGENCY MEDICINE
Payer: MEDICARE

## 2022-04-07 VITALS
RESPIRATION RATE: 18 BRPM | OXYGEN SATURATION: 95 % | SYSTOLIC BLOOD PRESSURE: 145 MMHG | HEART RATE: 98 BPM | TEMPERATURE: 98 F | WEIGHT: 270.07 LBS | HEIGHT: 73 IN | DIASTOLIC BLOOD PRESSURE: 95 MMHG

## 2022-04-07 DIAGNOSIS — K21.9 GASTRO-ESOPHAGEAL REFLUX DISEASE WITHOUT ESOPHAGITIS: ICD-10-CM

## 2022-04-07 DIAGNOSIS — Z91.010 ALLERGY TO PEANUTS: ICD-10-CM

## 2022-04-07 DIAGNOSIS — F41.8 OTHER SPECIFIED ANXIETY DISORDERS: ICD-10-CM

## 2022-04-07 DIAGNOSIS — I10 ESSENTIAL (PRIMARY) HYPERTENSION: ICD-10-CM

## 2022-04-07 DIAGNOSIS — Z20.822 CONTACT WITH AND (SUSPECTED) EXPOSURE TO COVID-19: ICD-10-CM

## 2022-04-07 DIAGNOSIS — E11.9 TYPE 2 DIABETES MELLITUS WITHOUT COMPLICATIONS: ICD-10-CM

## 2022-04-07 DIAGNOSIS — F25.0 SCHIZOAFFECTIVE DISORDER, BIPOLAR TYPE: ICD-10-CM

## 2022-04-07 DIAGNOSIS — F42.9 OBSESSIVE-COMPULSIVE DISORDER, UNSPECIFIED: ICD-10-CM

## 2022-04-07 DIAGNOSIS — F19.10 OTHER PSYCHOACTIVE SUBSTANCE ABUSE, UNCOMPLICATED: ICD-10-CM

## 2022-04-07 DIAGNOSIS — R45.851 SUICIDAL IDEATIONS: ICD-10-CM

## 2022-04-07 LAB
ALBUMIN SERPL ELPH-MCNC: 4.2 G/DL — SIGNIFICANT CHANGE UP (ref 3.3–5)
ALP SERPL-CCNC: 84 U/L — SIGNIFICANT CHANGE UP (ref 40–120)
ALT FLD-CCNC: 84 U/L — HIGH (ref 10–45)
AMPHET UR-MCNC: NEGATIVE — SIGNIFICANT CHANGE UP
ANION GAP SERPL CALC-SCNC: 9 MMOL/L — SIGNIFICANT CHANGE UP (ref 5–17)
APAP SERPL-MCNC: <5 UG/ML — LOW (ref 10–30)
APPEARANCE UR: ABNORMAL
AST SERPL-CCNC: 125 U/L — HIGH (ref 10–40)
BACTERIA # UR AUTO: PRESENT /HPF
BARBITURATES UR SCN-MCNC: NEGATIVE — SIGNIFICANT CHANGE UP
BASOPHILS # BLD AUTO: 0.04 K/UL — SIGNIFICANT CHANGE UP (ref 0–0.2)
BASOPHILS NFR BLD AUTO: 0.6 % — SIGNIFICANT CHANGE UP (ref 0–2)
BENZODIAZ UR-MCNC: NEGATIVE — SIGNIFICANT CHANGE UP
BILIRUB SERPL-MCNC: 0.2 MG/DL — SIGNIFICANT CHANGE UP (ref 0.2–1.2)
BILIRUB UR-MCNC: NEGATIVE — SIGNIFICANT CHANGE UP
BUN SERPL-MCNC: 18 MG/DL — SIGNIFICANT CHANGE UP (ref 7–23)
CALCIUM SERPL-MCNC: 9.3 MG/DL — SIGNIFICANT CHANGE UP (ref 8.4–10.5)
CHLORIDE SERPL-SCNC: 104 MMOL/L — SIGNIFICANT CHANGE UP (ref 96–108)
CO2 SERPL-SCNC: 26 MMOL/L — SIGNIFICANT CHANGE UP (ref 22–31)
COCAINE METAB.OTHER UR-MCNC: POSITIVE
COLOR SPEC: YELLOW — SIGNIFICANT CHANGE UP
COMMENT - URINE: SIGNIFICANT CHANGE UP
CREAT SERPL-MCNC: 1.08 MG/DL — SIGNIFICANT CHANGE UP (ref 0.5–1.3)
DIFF PNL FLD: NEGATIVE — SIGNIFICANT CHANGE UP
EGFR: 87 ML/MIN/1.73M2 — SIGNIFICANT CHANGE UP
EOSINOPHIL # BLD AUTO: 0.1 K/UL — SIGNIFICANT CHANGE UP (ref 0–0.5)
EOSINOPHIL NFR BLD AUTO: 1.5 % — SIGNIFICANT CHANGE UP (ref 0–6)
EPI CELLS # UR: SIGNIFICANT CHANGE UP /HPF (ref 0–5)
ETHANOL SERPL-MCNC: <10 MG/DL — SIGNIFICANT CHANGE UP (ref 0–10)
GLUCOSE SERPL-MCNC: 112 MG/DL — HIGH (ref 70–99)
GLUCOSE UR QL: NEGATIVE — SIGNIFICANT CHANGE UP
HCT VFR BLD CALC: 37.8 % — LOW (ref 39–50)
HGB BLD-MCNC: 12.8 G/DL — LOW (ref 13–17)
IMM GRANULOCYTES NFR BLD AUTO: 0.3 % — SIGNIFICANT CHANGE UP (ref 0–1.5)
KETONES UR-MCNC: NEGATIVE — SIGNIFICANT CHANGE UP
LEUKOCYTE ESTERASE UR-ACNC: NEGATIVE — SIGNIFICANT CHANGE UP
LYMPHOCYTES # BLD AUTO: 1.56 K/UL — SIGNIFICANT CHANGE UP (ref 1–3.3)
LYMPHOCYTES # BLD AUTO: 22.8 % — SIGNIFICANT CHANGE UP (ref 13–44)
MCHC RBC-ENTMCNC: 28.9 PG — SIGNIFICANT CHANGE UP (ref 27–34)
MCHC RBC-ENTMCNC: 33.9 GM/DL — SIGNIFICANT CHANGE UP (ref 32–36)
MCV RBC AUTO: 85.3 FL — SIGNIFICANT CHANGE UP (ref 80–100)
METHADONE UR-MCNC: NEGATIVE — SIGNIFICANT CHANGE UP
MONOCYTES # BLD AUTO: 0.42 K/UL — SIGNIFICANT CHANGE UP (ref 0–0.9)
MONOCYTES NFR BLD AUTO: 6.1 % — SIGNIFICANT CHANGE UP (ref 2–14)
NEUTROPHILS # BLD AUTO: 4.71 K/UL — SIGNIFICANT CHANGE UP (ref 1.8–7.4)
NEUTROPHILS NFR BLD AUTO: 68.7 % — SIGNIFICANT CHANGE UP (ref 43–77)
NITRITE UR-MCNC: NEGATIVE — SIGNIFICANT CHANGE UP
NRBC # BLD: 0 /100 WBCS — SIGNIFICANT CHANGE UP (ref 0–0)
OPIATES UR-MCNC: NEGATIVE — SIGNIFICANT CHANGE UP
PCP SPEC-MCNC: SIGNIFICANT CHANGE UP
PCP UR-MCNC: NEGATIVE — SIGNIFICANT CHANGE UP
PH UR: 7.5 — SIGNIFICANT CHANGE UP (ref 5–8)
PLATELET # BLD AUTO: 201 K/UL — SIGNIFICANT CHANGE UP (ref 150–400)
POTASSIUM SERPL-MCNC: 4.1 MMOL/L — SIGNIFICANT CHANGE UP (ref 3.5–5.3)
POTASSIUM SERPL-SCNC: 4.1 MMOL/L — SIGNIFICANT CHANGE UP (ref 3.5–5.3)
PROT SERPL-MCNC: 7 G/DL — SIGNIFICANT CHANGE UP (ref 6–8.3)
PROT UR-MCNC: ABNORMAL MG/DL
RBC # BLD: 4.43 M/UL — SIGNIFICANT CHANGE UP (ref 4.2–5.8)
RBC # FLD: 13.2 % — SIGNIFICANT CHANGE UP (ref 10.3–14.5)
SALICYLATES SERPL-MCNC: <0.3 MG/DL — LOW (ref 2.8–20)
SARS-COV-2 RNA SPEC QL NAA+PROBE: NEGATIVE — SIGNIFICANT CHANGE UP
SODIUM SERPL-SCNC: 139 MMOL/L — SIGNIFICANT CHANGE UP (ref 135–145)
SP GR SPEC: 1.01 — SIGNIFICANT CHANGE UP (ref 1–1.03)
THC UR QL: NEGATIVE — SIGNIFICANT CHANGE UP
UROBILINOGEN FLD QL: 0.2 E.U./DL — SIGNIFICANT CHANGE UP
WBC # BLD: 6.85 K/UL — SIGNIFICANT CHANGE UP (ref 3.8–10.5)
WBC # FLD AUTO: 6.85 K/UL — SIGNIFICANT CHANGE UP (ref 3.8–10.5)
WBC UR QL: < 5 /HPF — SIGNIFICANT CHANGE UP

## 2022-04-07 PROCEDURE — 81001 URINALYSIS AUTO W/SCOPE: CPT

## 2022-04-07 PROCEDURE — 99284 EMERGENCY DEPT VISIT MOD MDM: CPT | Mod: FS

## 2022-04-07 PROCEDURE — 93005 ELECTROCARDIOGRAM TRACING: CPT

## 2022-04-07 PROCEDURE — 99283 EMERGENCY DEPT VISIT LOW MDM: CPT

## 2022-04-07 PROCEDURE — 80053 COMPREHEN METABOLIC PANEL: CPT

## 2022-04-07 PROCEDURE — 80307 DRUG TEST PRSMV CHEM ANLYZR: CPT

## 2022-04-07 PROCEDURE — 87635 SARS-COV-2 COVID-19 AMP PRB: CPT

## 2022-04-07 PROCEDURE — 36415 COLL VENOUS BLD VENIPUNCTURE: CPT

## 2022-04-07 PROCEDURE — 85025 COMPLETE CBC W/AUTO DIFF WBC: CPT

## 2022-04-07 NOTE — ED BEHAVIORAL HEALTH ASSESSMENT NOTE - DIFFERENTIAL
Malingering suspected to gain admission to an inpt unit Adjustment Disorder with mixed anxiety and depressed mood  Substance Induced Mood Disorder  Antisocial Personality Disorder  Cocaine Use Disorder  Alcohol Use Disorder  Cannabis Use Disorder

## 2022-04-07 NOTE — ED PROVIDER NOTE - NSFOLLOWUPINSTRUCTIONS_ED_ALL_ED_FT
Please take all your medications as prescribed and f/u with out psychiatrist for re-evaluation and further management as recommended. You may f/u at [facility] or [facility]  psychiatric clinics.      Managing Depression, Adult      Depression is a mental health condition that affects your thoughts, feelings, and actions. Being diagnosed with depression can bring you relief if you did not know why you have felt or behaved a certain way. It could also leave you feeling overwhelmed with uncertainty about your future. Preparing yourself to manage your symptoms can help you feel more positive about your future.      How to manage lifestyle changes      Managing stress      Stress is your body's reaction to life changes and events, both good and bad. Stress can add to your feelings of depression. Learning to manage your stress can help lessen your feelings of depression.    Try some of the following approaches to reducing your stress (stress reduction techniques):  •Listen to music that you enjoy and that inspires you.      •Try using a meditation orquidea or take a meditation class.      •Develop a practice that helps you connect with your spiritual self. Walk in nature, pray, or go to a place of Sikhism.      •Do some deep breathing. To do this, inhale slowly through your nose. Pause at the top of your inhale for a few seconds and then exhale slowly, letting your muscles relax.      •Practice yoga to help relax and work your muscles.      Choose a stress reduction technique that suits your lifestyle and personality. These techniques take time and practice to develop. Set aside 5–15 minutes a day to do them. Therapists can offer training in these techniques. Other things you can do to manage stress include:  •Keeping a stress diary.      •Knowing your limits and saying no when you think something is too much.      •Paying attention to how you react to certain situations. You may not be able to control everything, but you can change your reaction.      •Adding humor to your life by watching funny films or TV shows.      •Making time for activities that you enjoy and that relax you.      Medicines     Medicines, such as antidepressants, are often a part of treatment for depression.  •Talk with your pharmacist or health care provider about all the medicines, supplements, and herbal products that you take, their possible side effects, and what medicines and other products are safe to take together.      •Make sure to report any side effects you may have to your health care provider.      Relationships    Your health care provider may suggest family therapy, couples therapy, or individual therapy as part of your treatment.      How to recognize changes    Everyone responds differently to treatment for depression. As you recover from depression, you may start to:  •Have more interest in doing activities.      •Feel less hopeless.      •Have more energy.      •Overeat less often, or have a better appetite.      •Have better mental focus.      It is important to recognize if your depression is not getting better or is getting worse. The symptoms you had in the beginning may return, such as:  •Tiredness (fatigue) or low energy.      •Eating too much or too little.      •Sleeping too much or too little.      •Feeling restless, agitated, or hopeless.      •Trouble focusing or making decisions.      •Unexplained physical complaints.      •Feeling irritable, angry, or aggressive.      If you or your family members notice these symptoms coming back, let your health care provider know right away.      Follow these instructions at home:      Activity      •Try to get some form of exercise each day, such as walking, biking, swimming, or lifting weights.      •Practice stress reduction techniques.      •Engage your mind by taking a class or doing some volunteer work.      Lifestyle     •Get the right amount and quality of sleep.      •Cut down on using caffeine, tobacco, alcohol, and other potentially harmful substances.      •Eat a healthy diet that includes plenty of vegetables, fruits, whole grains, low-fat dairy products, and lean protein. Do not eat a lot of foods that are high in solid fats, added sugars, or salt (sodium).      General instructions     •Take over-the-counter and prescription medicines only as told by your health care provider.      •Keep all follow-up visits as told by your health care provider. This is important.        Where to find support      Talking to others      Friends and family members can be sources of support and guidance. Talk to trusted friends or family members about your condition. Explain your symptoms to them, and let them know that you are working with a health care provider to treat your depression. Tell friends and family members how they also can be helpful.    Finances     •Find appropriate mental health providers that fit with your financial situation.      •Talk with your health care provider about options to get reduced prices on your medicines.        Where to find more information    You can find support in your area from:   •Anxiety and Depression Association of [place] (ADAA): www.adaa.org      •M[employer] [place]: www.mentalhealth[place].net      •National [place] [employer] Mental Illness: www.natasha.org        Contact a health care provider if:  •You stop taking your antidepressant medicines, and you have any of these symptoms:  •Nausea.      •Headache.      •Light-headedness.      •Chills and body aches.      •Not being able to sleep (insomnia).        •You or your friends and family think your depression is getting worse.        Get help right away if:    •You have thoughts of hurting yourself or others.      If you ever feel like you may hurt yourself or others, or have thoughts about taking your own life, get help right away. Go to your nearest emergency department or:    • Call your local emergency services (911 in the [place]       • Call a suicide crisis helpline, such as the National Suicide Prevention Lifeline at 1-168.491.6146. This is open 24 hours a day in the [place]       • Text the Crisis Text Line at 792781 (in the [place]         Summary    •If you are diagnosed with depression, preparing yourself to manage your symptoms is a good way to feel positive about your future.      •Work with your health care provider on a management plan that includes stress reduction techniques, medicines (if applicable), therapy, and healthy lifestyle habits.      •Keep talking with your health care provider about how your treatment is working.      • If you have thoughts about taking your own life, call a suicide crisis helpline or text a crisis text line.       This information is not intended to replace advice given to you by your health care provider. Make sure you discuss any questions you have with your health care provider.

## 2022-04-07 NOTE — ED PROVIDER NOTE - PROGRESS NOTE DETAILS
pt was evaluated by psychiatrist on call. d/c home recommended. pt became very agitated and refused to be discharged, continue saying that he "will kill himself" security and [employer] called to assist.  as per medical records review and as per consult pt has h/o this behavior upon discharge.

## 2022-04-07 NOTE — ED BEHAVIORAL HEALTH ASSESSMENT NOTE - HPI (INCLUDE ILLNESS QUALITY, SEVERITY, DURATION, TIMING, CONTEXT, MODIFYING FACTORS, ASSOCIATED SIGNS AND SYMPTOMS)
45 yo  male, single, noncaregiver, unemployed (states he has worked as a  6 months ago), undomiciled for years and was living at sober house [facility] late October/early November of this year which he left due to continued substance use, with subsequent going in/out of medical facilities in what is suspected to be in order to avoid homeless shelters with chronologically correlating ED visits verbalizing suicidal ideation and request for admission with long charted hx of "throwing away" medications on discharge and not following up on outpatient referrals; multiple prior inpt psych admissions (most recently [facility] Unit L4 11/24/21-12/3/21), + long term, continuous Polysubstance Abuse (12 beers q2 days; no known hx of complicated withdrawals), Cocaine Abuse (UTOX positive on last several D visits; was not done today at  ED), + hx of arrest for drug possession; extensive inpatient psychiatric documentation outlining Antisocial Personality traits and intentional acting out behaviors including threats when he was close to discharge (ie. ..."in his room, he became agitated and violent at night and needed IM prns and seclusion.  He flipped a table and threatened to punch his roommate.  He then urinated in the Seclusion room.  He reported that he would shoot himself if he was discharged"), with no known hx of actual suicide attempts. At this time, Patient self-presented to the  ED last night "presenting to the ED today S/P a fall. Patient endorses that he was drinking, after which he stumbled on a street curb causing him to fall backwards and hit the right side of the back of his head on the ground." This morning as he was prepared for discharge, he told staff that he wants to kill himself. Telepsychiatry called.     EXAM: calm, cooperative initially - Starts off with "I want to jump off a bridge. Admit me." Patient expectantly looking into camera as if awaiting an immediate offer of admission  to inpatient psychiatry. Initially saying h has never seen a psychiatrist before and then says "I guess" when asked if he was the same Mr Calhoun who just got discharged from  [facility] 3 weeks ago. Patient admits that he never went to the [facility] outpatient program he was referred to and admitted that he "never took" his discharge medications as "they didn't work." Patient then states that his issue is that he has back pain and needs Neurontin and pain medications and thus his PCP Dr Blevins gives hum Neurontin, Prozac and Seroquel which he prefers and feels like "this combo is working better." (Of note; Dr Nunez is also prescribing benzos for Patient and has for > 1 year). Pt gets visibly annoyed at having to answer questions and the interview progresses. Pt's history of inpatient admissions, minimal cooperation with theraputic milieu and consistent "throwing away" of his discharge medications and refusal to go to recommended and scheduled outpatient program/services was reviewed with him. Patient at this point scowling at camera and said "I wish there was a real person I could talk to." Patient directly asked how readmission to inpatient psychiatry would be different at this time / how it would help him this time around , as well as what he was looking for as he reported that Dr Blevins's medication combination is working bettr than what Aidan montoya him- Patient said he did not know and said "maybe I will feel differently." Patient not able to elaborate what his treatment goals would be and what he would do differently. Patient said he is ok to "just waiting 3 days in the Emergency Room" as well when informed of the wait for inpt beds due to COVID outbreak. Pt thus offered to consider going to an inpatient substance abuse rehab given his history which Pt outright refused and in a threatening tone of voice said "If I do, I will kill myself" and started into the camera. Also said that the interview is an "interrogation." Patient then was asked to elaborate on what his symptoms as to mood, anxiety - at this point in time, the camera seemed to have frozen / turned off. Writer re-accessed the Telepsych cart immediately and Patient was no longer in the room. It is highly likely Pt walked out of the room.    COLLATERAL:     Per [facility] GILBERT Discharge Summary (02/24/22 - 02/27/22) written by JAY Torrez on 02/27/22:  46 year old male with PMH HSV (tx in 2019), HTN and PPH Anxiety, Depression, OCD, presenting voluntarily to [facility] admitting requesting inpatient detox for alcohol and cocaine use disorders.  The patient denies a history of overdose, admits history of blackouts (last one a year ago).  Patient educated on MAT and risk of overdose with multiple substances.  Patient already on Gabapentin 400 mg PO QID.  Patient discharged from [facility] detox (02/14/21) 10 days ago and has several drinks daily since discharge.  The patient is domiciled with mother, unemployed and supports self with SSD.  The patient is single.  Longest sobriety was 4.5 years ([phone]), however, the patient relapsed when the meetings ended.  Highest level of education was BA in psychology.  The patient had an uneventful hospital course and was discharged on 02/27/22 with aftercare plans with the IDT in order to avoid higher risk of relapse.  The patient was started on naltrexone 50 mg PO daily.  The patient was on Abilify 5 mg PO daily, fluoxetine 40 mg PO daily, gabapentin 400 mg PO q6h.  The patient's quetiapine 100 mg PO qHs was discontinued during his admission.  The patient was discharged with follow-up at the [facility] ([phone]), [facility] ([phone]) and [facility] ([phone]).      PSYCKES:  MEDICAID ID: [id number]  TREATMENT FOR SI: Six3 (first on 01/10/19) most recently on 03/21/21 at [facility]  QUALITY FLAGS: High Mental Health Need, High Utilization Inpt/ER, Readmission Post-Discharge from any Hospital ( to ).  BEHAVIORAL HEALTH DIAGNOSES: Alcohol related disorders Cocaine related disorders Sedative, hypnotic, or anxiolytic related disorders Unspecified/Other Anxiety Disorder Major Depressive Disorder Other psychoactive substance related disorders Generalized Anxiety Disorder Opioid related disorders Cannabis related disorders Unspecified/Other Bipolar Obsessive-Compulsive Disorder Schizoaffective Disorder Unspecified/Other Depressive Disorder Unspecified/Other Personality Disorder Adjustment Disorder Panic Disorder Persistent Depressive Disorder Substance-Induced Depressive Disorder Tobacco related disorder  PSYCHIATRIC MEDICATIONS: No Medicaid claims for this data type in the past 5 years  OUTPATIENT: MS (08/25/21) for Alcohol Abuse Uncomplicated, Bethel Blevins (08/29/17-01/24/20; 02/16/21-06/22/21) for MDD single episode unspecified, Marla Falcon (02/23/21) for Alcohol Dependence Uncomplicated, [facility] ([facility]/18[facility] for Adjustment Disorder with Mixed Anxiety and Depressed Mood, Patrick Knapp MD (10/30/18-11/14/18) for Other Psychoactive Substance Dependence in Remission, [facility] (08/24/18-10/01/18) for Alcohol Dependence Uncomplicated, [facility] (04/03/18) for Alcohol Dependence Uncomplicated, [facility] (12/18/17-01/12/18) for Alcohol Abuse Uncomplicated  INPATIENT: GILBERT x17 most recently at [facility] (06/07/21-06/15/21) for Alcohol Dependence Uncomplicated, MS (03/30/21-04/03/21) for Alcohol Dependence with Withdrawal Unspecified,   ED: MH x6 most recently at [facility] (08/25/21; 08/26/21) for MDD Single Episode Unspecified, GILBERT x7 most recently at [facility] (04/07/21) for Alcohol Abuse with Intoxication Unspecified, [facility] (03/21/21) for Alcohol Abuse with Intoxication Unspecified    ISTOP:  Reference #: [id number]  Patient Name: Pancho Calhoun  YOB: 1975  Address: [address] [place] [address]  Sex: Male  Rx Written	Rx Dispensed	Drug	Quantity	Days Supply	Prescriber Name	Prescriber Adrianne #	Payment Method  12/04/2021	12/04/2021	clonazepam 1 mg tablet	90	30	Bethel Nunez MD	[id number]	Insurance  •	Dispenser [facility]  08/20/2021	08/20/2021	diazepam 10 mg tablet	90	30	Bethel Nunez MD	[id number]	Insurance  •	Dispenser [facility]  06/15/2021	07/13/2021	diazepam 10 mg tablet	90	30	Bethel Nunez MD	[id number]	Insurance  •	Dispenser [facility]  05/04/2021	05/04/2021	clonazepam 1 mg tablet	90	30	RosalvaBethel Corey MD	[id number]	Insurance  •	Dispenser [facility]  * - Drugs marked with an asterisk are compound drugs. If the compound drug is made up of more than one controlled substance, then each controlled substance will be a separate row in the table. 46 year old  male, single, unemployed (on SSD), domiciled alone in a private apartment in [facility] for the past 6 months with PMH GERD, Diabetes mellitus Type 2, Obesity, a well documented history of admissions to various medical facilities in what is suspected to be attempts to avoid homeless shelters with chronologically correlating ED visits verbalizing suicidal ideation and request for admission with long charted history of "throwing away" medications on discharge and not following up on outpatient referrals, multiple prior inpatient psychiatric admissions (most recently at [facility]), + long term, continuous Polysubstance Abuse (Tobacco, Alcohol, Cocaine, Cannabis, Opioid), history of multiple GILBERT rehab/detox (most recently at [facility] 02/24/22 - 02/27/22), history of multiple arrests for drug possession; extensive inpatient psychiatric documentation outlining Antisocial Personality Traits and intentional acting out behaviors including threats when he was close to discharge (ie. ..."in his room, he became agitated and violent at night and needed IM prns and seclusion.  He flipped a table and threatened to punch his roommate.  He then urinated in the Seclusion room.  He reported that he would shoot himself if he was discharged"), with no history of suicide attempts/NSSIB, not currently engaged in outpatient psychiatric treatment (patient provided a follow-up appointment at [facility] for 04/07/22 but the patient did not attend), BIB self to [facility] ED reporting vague SI for 2-3 days.      On interview, the patient is calm, superficially cooperative, demonstrating good behavioral control and linear thought processes.  The patient states that he came to [place] from [place] today to walk through [place].  However, he states that, while walking around, he began to feel "something was off" and so he came to [facility] ED.  The patient states that he has been experiencing suicidal ideation for the past two or three days.  He states that he cannot identify any acute stressors or precipitating factors to the SI.  When asked if he has a plan, the patient states "I don't know, jump in front of a train."  When asked if he though of any other means of ending his life, the patient states "Maybe hang myself?"  The patient denies any history of suicide attempts or self-harm.  The patient states that he has experienced depression "on and off for the last 2-3 months" which he describes as low mood (e.g. "very down") accompanied by hypersomnia (e.g. sleeps 12 hours daily), low energy, poor attention/concentration and intermittent SI with vague plan and no intent.  The patient reports a good appetite and he denies any feelings of guilt.  The patient also reports chronic anxiety which he describes as "tension" which he has experienced "as long as [he] can remember."  The patient reports that he stopped smoking tobacco three days ago.  He denies any alcohol use (BAL < 10) and he states that he used cocaine approximately 2 days ago (Utox on presentation positive for cocaine; prior to 2 days ago he states that he was abstinent for over a month).  The patient states that he was recently discharged from [facility] where he was admitted for 24 days for SI.  The patient states that he is currently on aripiprazole, escitalopram, buspirone and mirtazapine but he cannot recall the doses.  The patient states that he was given outpatient psychiatric follow-up at [facility] today, 04/07/22, but he states that he "didn't feel like going" but that he "probably should have."  When asked how he can best be helped, the patient states "I don't know" and shrugs his shoulders.  He states "I just don't feel safe."  The patient declines to provide any collateral contact information.  However, his mother's number (Rosmery Yepez; [phone] found listed under his emergency contact.  A voicemail was left requesting call-back to [facility].  All questions and concerns addressed.      COLLATERAL:     Per [facility] GILBERT Discharge Summary (02/24/22 - 02/27/22) written by JAY Torrez on 02/27/22:  46 year old male with PMH HSV (tx in 2019), HTN and PPH Anxiety, Depression, OCD, presenting voluntarily to 1 [facility] admitting requesting inpatient detox for alcohol and cocaine use disorders.  The patient denies a history of overdose, admits history of blackouts (last one a year ago).  Patient educated on MAT and risk of overdose with multiple substances.  Patient already on Gabapentin 400 mg PO QID.  Patient discharged from [facility] detox (02/14/21) 10 days ago and has several drinks daily since discharge.  The patient is domiciled with mother, unemployed and supports self with SSD.  The patient is single.  Longest sobriety was 4.5 years ([phone]), however, the patient relapsed when the meetings ended.  Highest level of education was BA in psychology.  The patient had an uneventful hospital course and was discharged on 02/27/22 with aftercare plans with the IDT in order to avoid higher risk of relapse.  The patient was started on naltrexone 50 mg PO daily.  The patient was on Abilify 5 mg PO daily, fluoxetine 40 mg PO daily, gabapentin 400 mg PO q6h.  The patient's quetiapine 100 mg PO qHs was discontinued during his admission.  The patient was discharged with follow-up at the [facility] ([phone]), [facility] ([phone]) and [facility] ([phone]).      PSYCKES:  MEDICAID ID: [id number]  TREATMENT FOR SI: Six3 (first on 01/10/19) most recently on 03/21/21 at [facility]  QUALITY FLAGS: High Mental Health Need, High Utilization Inpt/ER, Readmission Post-Discharge from any Hospital ( to ).  BEHAVIORAL HEALTH DIAGNOSES: Alcohol related disorders Cocaine related disorders Sedative, hypnotic, or anxiolytic related disorders Unspecified/Other Anxiety Disorder Major Depressive Disorder Other psychoactive substance related disorders Generalized Anxiety Disorder Opioid related disorders Cannabis related disorders Unspecified/Other Bipolar Obsessive-Compulsive Disorder Schizoaffective Disorder Unspecified/Other Depressive Disorder Unspecified/Other Personality Disorder Adjustment Disorder Panic Disorder Persistent Depressive Disorder Substance-Induced Depressive Disorder Tobacco related disorder  PSYCHIATRIC MEDICATIONS: No Medicaid claims for this data type in the past 5 years  OUTPATIENT: MS (08/25/21) for Alcohol Abuse Uncomplicated, Bethel Blevins (08/29/17-01/24/20; 02/16/21-06/22/21) for MDD single episode unspecified, Marla Falcon (02/23/21) for Alcohol Dependence Uncomplicated, [facility] (05/18/19) for Adjustment Disorder with Mixed Anxiety and Depressed Mood, Patrick Knapp MD (10/30/18-11/14/18) for Other Psychoactive Substance Dependence in Remission, [facility]. (08/24/18-10/01/18) for Alcohol Dependence Uncomplicated, [facility] (04/03/18) for Alcohol Dependence Uncomplicated, [facility] (12/18/17-01/12/18) for Alcohol Abuse Uncomplicated  INPATIENT: GILBERT x17 most recently at [facility] (06/07/21-06/15/21) for Alcohol Dependence Uncomplicated, [facility] (03/30/21-04/03/21) for Alcohol Dependence with Withdrawal Unspecified,   ED: MH x6 most recently at [facility] (08/25/21; 08/26/21) for MDD Single Episode Unspecified, GILBERT x7 most recently at [facility] (04/07/21) for Alcohol Abuse with Intoxication Unspecified, [facility] (03/21/21) for Alcohol Abuse with Intoxication Unspecified    ISTOP:  Reference #: [id number]  Patient Name: Pancho Yepez  YOB: 1975  Address: [address] [place] [address]  Sex: Male  Rx Written	Rx Dispensed	Drug	Quantity	Days Supply	Prescriber Name	Prescriber Adrianne #	Payment Method  12/04/2021	12/04/2021	clonazepam 1 mg tablet	90	30	Bethel Blackwell MD	[id number]	Insurance  •	Dispenser [facility]  08/20/2021	08/20/2021	diazepam 10 mg tablet	90	30	RosalvaBethel Corey MD	[id number]	Insurance  •	Dispenser [facility]  06/15/2021	07/13/2021	diazepam 10 mg tablet	90	30	RosalvaBethel Corey MD	[id number]	Insurance  •	Dispenser [facility]  05/04/2021	05/04/2021	clonazepam 1 mg tablet	90	30	RosalvaBethel Corey MD	[id number]	Insurance  •	Dispenser [facility]  * - Drugs marked with an asterisk are compound drugs. If the compound drug is made up of more than one controlled substance, then each controlled substance will be a separate row in the table.

## 2022-04-07 NOTE — ED BEHAVIORAL HEALTH ASSESSMENT NOTE - PAST PSYCHOTROPIC MEDICATION
quetiapine, risperdal, lithium, Topomax, Abilify, Wellbutrin, Geodon, Lexapro, trazodone , Valium quetiapine, risperdal, lithium, Topomax, Abilify, Wellbutrin, Geodon, Lexapro, trazodone , Valium, Naltrexone.

## 2022-04-07 NOTE — ED BEHAVIORAL HEALTH ASSESSMENT NOTE - NSCURPASTPSYDX_PSY_ALL_CORE
Alcohol/Substance Use disorders/Cluster B Personality disorder/traits Alcohol/Substance Use disorders/Cluster B Personality disorder/traits/Conduct problems

## 2022-04-07 NOTE — ED BEHAVIORAL HEALTH ASSESSMENT NOTE - NSBHSAOPI_PSY_A_CORE FT
The patient denies use but prior documentation indicates the patient has a history of opioid use disorder.

## 2022-04-07 NOTE — ED BEHAVIORAL HEALTH ASSESSMENT NOTE - DETAILS
all notes from Inpatient Psychiatrist and Treatment Team reviewed Pt refused to cooperative earlier charts report Pt's brother  of a drug overdose see HPI; long hx of making suicidal threats in medical settings with high index of suspicion in order to gain admission self referred Discharged from Inpatient Rehab at [facility] on 02/27/22 and from Inpatient Rehab at [facility] on 02/20/22. as per records, hx of acting out aggressive behavior on inpatient units when he is close to discharge or does not get what he asked for Discussed with ED Attending Dr. Dillard. history of  service in the [employer] from 2008 to 2012 but he denies seeing any combat. All notes from Inpatient Psychiatrist and Treatment Team reviewed The patient was offered an opportunity to complete a Safety Plan but refused. Prior documentation indicates that the patient has reported that his brother  of a drug overdose although the patient currently denies any family psychiatric history or SAs.

## 2022-04-07 NOTE — ED BEHAVIORAL HEALTH ASSESSMENT NOTE - PRIMARY DX
Cocaine abuse with cocaine-induced mood disorder Adjustment disorder with mixed anxiety and depressed mood

## 2022-04-07 NOTE — ED PROVIDER NOTE - CARE PLAN
Principal Discharge DX:	Suicidal ideation   1 Principal Discharge DX:	Suicidal ideation  Secondary Diagnosis:	Substance abuse

## 2022-04-07 NOTE — ED PROVIDER NOTE - PATIENT PORTAL LINK FT
You can access the FollowMyHealth Patient Portal offered by [facility] by registering at the following website: [email or web address]. By joining [facility]’s FollowMyHealth portal, you will also be able to view your health information using other applications (apps) compatible with our system.

## 2022-04-07 NOTE — ED BEHAVIORAL HEALTH ASSESSMENT NOTE - LEGAL HISTORY
TO [facility] AT 0755    HX TAKEN : PT HAD A COLONOSCOPY AND FOUND ISCHEMIC COLITIS , HAD ECHO OF HEART AND HAS A LOW EF    PT CONSENTED AT 0808     BASELINE VITAL SIGNS   HR 64  /80    CTA ORDERED BY ARIANNE AGUILAR WAS PT GIVEN CTA  PREMEDS NO, I hx of arrest for drug possession

## 2022-04-07 NOTE — ED BEHAVIORAL HEALTH ASSESSMENT NOTE - VIOLENCE RISK FACTORS:
Antisocial behavior/cognition (past or present)/Substance abuse/Noncompliance with treatment Antisocial behavior/cognition (past or present)/Substance abuse/Impulsivity/Noncompliance with treatment/Irritability

## 2022-04-07 NOTE — ED BEHAVIORAL HEALTH ASSESSMENT NOTE - SAFETY PLAN ADDT'L DETAILS
Education provided regarding environmental safety / lethal means restriction/Provision of National Suicide Prevention Lifeline 6-897-239-TALK (9869)

## 2022-04-07 NOTE — ED BEHAVIORAL HEALTH ASSESSMENT NOTE - NSSUICPROTFACT_PSY_ALL_CORE
Other Supportive social network of family or friends/Cultural, spiritual and/or moral attitudes against suicide/Restorationism beliefs

## 2022-04-07 NOTE — ED BEHAVIORAL HEALTH ASSESSMENT NOTE - CURRENT MEDICATION
Patient was discharged from [address] on 02/27/22 with Abilify 5 mg PO daily, fluoxetine 40 mg PO daily, gabapentin 400 mg PO q6h and naltrexone 50 mg PO daily. Patient was discharged from [address] on 02/27/22 with Abilify 5 mg PO daily, fluoxetine 40 mg PO daily, gabapentin 400 mg PO q6h and naltrexone 50 mg PO daily.    The patient states that he was discharge from [facility] on aripiprazole, escitalopram, buspirone and mirtazapine although he cannot recall the doses.

## 2022-04-07 NOTE — ED PROVIDER NOTE - CLINICAL SUMMARY MEDICAL DECISION MAKING FREE TEXT BOX
47 yo male with h/o HTN, GERD, DM, depression, c/o suicidal thoughts. pt reports he was d/tessie from [facility] 3 days ago. Pt missed his f/u appointment with psychiatrist at [facility] yesterday. Reports he has been admitted for SI 1 month ago. Pt states that medications that was prescribed to him is not working.   Pt also admits using cocaine 2 days ago. Denies any other illicit drug use, denies any suicidal attempts.  Pt is luann and cooperative . pending labs and psychiatric evaluation.

## 2022-04-07 NOTE — ED BEHAVIORAL HEALTH ASSESSMENT NOTE - OTHER PAST PSYCHIATRIC HISTORY (INCLUDE DETAILS REGARDING ONSET, COURSE OF ILLNESS, INPATIENT/OUTPATIENT TREATMENT)
see HPI  as per [facility] ED Psych eval on 5/05/2015 "single (never-) unemployed 39-year-old  male, with no dependents, domiciled in a room share residence called [facility] in [place], with PPDx of depression (diagnosed age 23), no prior psychiatric hospitalizations, history fleeting passive suicidal ideation with no intention/plan, no prior suicide attempts, no prior homicidal ideation, no prior AVTH, no prior violence/legal/arrest history, with chronic history of substance abuse (ETOH and cocaine: last used Wednesday and Friday respectively), with history of substance-abuse rehabilitation program admissions (last February 2015), no prior withdrawals/DTs/seizures, with no access to guns, with no prior family psychiatric history, was brought in by self complaining of depression in the setting of substance abuse relapse.     Patient reports to have been suffering from depression and substance abuse since he was 23-years-old. Reports to have been attended multiple substance-abuse rehabilitation program, most recently at [place], in [place] February 2015. Reports to have stayed there a very short time, about 10 days, and was discharged to a sober house in [place], where he continued outpatient treatment with a [facility] associated facility, which continued into late March. Reports to have been discharged from the program with a 3 month supply of Wellbutrin  mg once daily, with which he has been complaint. Reports to have come back to [place], where he stayed in a room share, at a place called [facility], in [place]. Reports to have relapsed multiple times over the past month, stating that he would be sober for a 3-4 days, and start using again. Reports to have last used alcohol last week Wednesday, and cocaine last Friday, the day he also tried heroin for the first time. Reports to have slept most of the weekend, Saturday and Sunday, and on Monday started reading his substance recovery literature he has gathered from his A.A and N.A meetings he has attended. Reports to have also called his A.A support group (people that are sober) for support and guidance. Reports to have went home last night, and spent a night with his mother (81), with plans to come to [facility] today after a recommendation from his brother (retired police). Reports to have gotten valium 7.5 mg to help stay calm and sleep." See HPI

## 2022-04-07 NOTE — ED PROVIDER NOTE - NS ED ATTENDING STATEMENT MOD
This was a shared visit with the CARLOS. I reviewed and verified the documentation and independently performed the documented:

## 2022-04-07 NOTE — ED BEHAVIORAL HEALTH ASSESSMENT NOTE - DESCRIPTION
GERD In triage, the patient reported SI x2 days.  He denied HI and recent SAs.  The patient was AOx3, calm and cooperative with ED staff. see HPI GERD, HSV, HTN, Diabetes mellitus Type 2, Obesity The patient was born in [place] and raised in [place] by his biological mother and biological father along with three brothers and one sister.  The patietn is currenlty single, unemployed (on SSD), domiciled alone in a private apartment in [facility] for the past 6 months.  The patient reports a history of  service in the [employer] from 2008 to 2012 but he denies seeing any combat.  The patient is [employer].  He completed a BA in Psychology at [employer] in 2006.

## 2022-04-07 NOTE — ED BEHAVIORAL HEALTH ASSESSMENT NOTE - NSBHSACOC_PSY_A_CORE FT
The patient smokes crack cocaine weekly (approximately 3 grams a week). The patient has a recent history of smoking crack cocaine weekly (approximately 3 grams a week).  However, the patient states that he was abstinent for over one month until he used cocaine two days ago (Utox cocaine +)

## 2022-04-07 NOTE — ED BEHAVIORAL HEALTH ASSESSMENT NOTE - NSBHSAALC_PSY_A_CORE FT
First use at the age of 18 years old.  The patient was consuming alcohol daily (6 beers and one pint of vodka). First use at the age of 18 years old.  The patient was consuming alcohol daily (6 beers and one pint of vodka) but states that he has been abstinent for one month. (BAL < 10).

## 2022-04-07 NOTE — ED BEHAVIORAL HEALTH ASSESSMENT NOTE - RISK ASSESSMENT
Low Acute Suicide Risk Chronic risk factors: single, male gender, ongoing chronic polysubstance abuse, highly likely Antisocial Personality Disorder, hx of intentional threatening behavior to have needs met; long hx of refusing to attend/complete outpatient substance abuse services; undomiciled, reports ongoing unemployment. Protective factors: young; healthy; no history of actual suicide attempts; access to health services. No acute risk factors identified - presenting at baseline as supported by extensive chart history

## 2022-04-07 NOTE — ED PROVIDER NOTE - OBJECTIVE STATEMENT
47 yo male with h/o HTN, GERD, DM, depression, c/o suicidal thoughts. pt reports he was d/tessie from [facility] 3 days ago. Pt missed his f/u appointment with psychiatrist at [facility] yesterday. Reports he has been admitted for SI 1 month ago. Pt states that medications that was prescribed to him is not working.   Pt also admits using cocaine 2 days ago. Denies any other illicit drug use, denies any suicidal attempts.

## 2022-04-07 NOTE — ED BEHAVIORAL HEALTH ASSESSMENT NOTE - SUMMARY
History and presentation consistent with Patient's well documented history of ongoing Polysubstance Abuse, Antisocial Personality Disorder with associated behaviors, with going in/out of medical facilities in what is suspected to be in order to avoid homeless shelters with chronologically correlating ED visits verbalizing suicidal ideation and request for admission,  long charted hx of "throwing away" medications on discharge and not following up on outpatient referrals; multiple prior inpt psych admissions (most recently [facility] L4 11/24/21-12/3/21), who initially presented as a fall due to intoxication and when it came to discharge from the ED, the reported suicidal ideation. MSE and overall clinical presentation and behavior demonstrated during exam consistent with Pt's baseline including malingering.   Would not benefit from return to inpatient psychiatry. Would benefit from going to substance rehab which Pt was offered and he refused. Discharge. Discussed with Dr Polk. 46 year old  male, single, unemployed (on SSD), domiciled alone in a private apartment in [facility] for the past 6 months with PMH HSV, GERD, Diabetes mellitus Type 2, Obesity and PPH Polysubstance Use Disorder (Tobacco, Alcohol, Cocaine, Cannabis, Opioid), history of multiple prior inpatient psychiatric admissions (most recently at [facility]), history of multiple GILBERT rehab/detox (most recently at [facility] 02/24/22 - 02/27/22), extensive inpatient psychiatric documentation outlining Antisocial Personality Traits and intentional acting out behaviors including threats when he was close to discharge, not currently in outpatient psychiatric treatment, history of treatment non-compliance, history of multiple arrests for drug possession, no history of suicide attempts/NSSIB, no trauma history, BIB self to [facility] ED reporting vague SI for 2-3 days.      On evaluation, the patient is calm, superficially cooperative, with euthymic affect, demonstrating good behavioral control and linear thought processes.  The patient reports SI for 2-3 days with vague plan and no intent.  The patient is goal-directed and focused on making his needs known and met.  The patient seems concerned primarily with admission for shelter and uses statements about SI as a means to that end. The patient additionally reports an interval history that is selectively vague, volitionally provocative and inconsistent with prior documentation. On MSE patient?was found to lack any genuine distress or objectively observable findings of decompensated depressive, manic, psychotic, anxiety/panic, personality disorder, intoxication, or withdrawal.  The patient's affect is full and reactive, incongruent with the reported mood.  There is no evidence to support a treatable psychiatric condition beyond substance use disorder at this time.  The patient has numerous chronic risk factor for harm to self or others (male gender, active substance use, multiple medical comorbidities, history of treatment non-compliance, antisocial traits, poor coping skills and acting out behaviors), but the patient is organized, help-seeking, forward-thinking with good social support, no history of SA/NSSIB, and no current manic or psychotic symptoms.  The patient's chronic risk factors are unlikely to be modified by an inpatient psychiatric admission.  Patient is not at increased risk from baseline at this time.  Given that the patient does not present with imminent danger to self/others, he does not meet criteria for psychiatric hospitalization or further observation. There is no clear evidence that there would be significant benefit or modification of risk factors from treatment in an inpatient setting.  Patient is stable for discharge. Any impulsive and/or violent actions taken after discharge would be volitional be better characterized as provocative acting out, goal-directed &?criminal in nature as opposed to the result of acute psychiatric illness. Overall patient's presentation at this encounter is best characterized as secondary gain seeking in the absence?of any genuine psychiatric?disturbance and is consistent with Adjustment Disorder with mixed anxiety and depressed mood versus Substance Induced Mood Disorder.  The patient is psychiatrically cleared for discharge to [facility].  The patient would benefit from outpatient psychiatric follow-up and GILBERT treatment.

## 2022-04-07 NOTE — ED PROVIDER NOTE - NS ED MD DISPO DISCHARGE
Home
Implemented All Universal Safety Interventions:  [place] to call system. Call bell, personal items and telephone within reach. Instruct patient to call for assistance. Room bathroom lighting operational. Non-slip footwear when patient is off stretcher. Physically safe environment: no spills, clutter or unnecessary equipment. Stretcher in lowest position, wheels locked, appropriate side rails in place.

## 2022-04-07 NOTE — ED ADULT NURSE NOTE - OBJECTIVE STATEMENT
Patient to the ED with complaint of suicidal ideation, worsening over the past 2 days.  Patient  reported that he was admitted at another facility for 24 days discharged 3 days ago and has had worsening thought of throwing himself under a train, denies any hx of suicidal attempts, HI auditory or visual hallucinations.  Patient reported that he is complaint with medication, took his AM dose of Abilify, Buspar, Lexapro and Remeron and is due for his PM dose.  Endorsed that he used cocaine 2 nights ago.  Patient placed on constant observation, undressed, belongings removed from his person for safety.

## 2022-04-07 NOTE — ED BEHAVIORAL HEALTH ASSESSMENT NOTE - REFERRAL / APPOINTMENT DETAILS
Pt has outpatient referral to [facility] by Aidan Purvis this month [facility] ([phone]) or [facility] to present on 04/08/22 at 9 am for initial intake.

## 2022-04-07 NOTE — ED BEHAVIORAL HEALTH ASSESSMENT NOTE - VIOLENCE PROTECTIVE FACTORS:
75M w/ PMHx of HFrEF (EF 10%) s/p ICD, Afib w/ recent admission for CHF ex/afib s/p DCCV (on july 2020), CKD3, DM2, hypothyroidism presents after being referred from CHF clinic for admission due to worsening of LE edema and failure of PO diuretics at home.    Nephrology consulted for EDIE    #EDIE on CKD  Pt with EDIE in the setting of HFrEF requiring aggressive IV diuresis-  Baseline sCr 2.0 - 2.4.  On admission sCr 2.4 - peaked to 3.43.  EDIE 2/2 cardio-renal, ?overdiuresis vs ATN -- initially requiring Bumex infusion now off     off Milrinone now  s/p mitral valve clip on 9/14  No absolute indications for RRT  sCr has remained stable - today at 2.7  strict I/O, daily weights    Monitor labs and urine output.   Avoid NSAIDs, ACEI/ARBS, RCA and nephrotoxins.   Dose medications as per eGFR.  Upon discharge to follow with Dr. Rees ([address] 2nd floor.  Clinic # [phone])   Other Residential stability

## 2022-04-07 NOTE — ED ADULT NURSE NOTE - NSICDXPASTMEDICALHX_GEN_ALL_CORE_FT
PAST MEDICAL HISTORY:  Anxiety     Anxiety depression      Bipolar disorder     GERD (gastroesophageal reflux disease)     H/O: depression     No pertinent past medical history     OCD (obsessive compulsive disorder)     Polysubstance abuse     Schizoaffective disorder

## 2022-04-07 NOTE — ED BEHAVIORAL HEALTH ASSESSMENT NOTE - DESCRIPTION (FIRST USE, LAST USE, QUANTITY, FREQUENCY, DURATION)
The patient refuses to provide information other than that he stopped smoking tobacco three days ago.

## 2022-04-08 PROBLEM — Z78.9 OTHER SPECIFIED HEALTH STATUS: Chronic | Status: ACTIVE | Noted: 2021-12-25

## 2022-04-08 NOTE — ED ADULT NURSE REASSESSMENT NOTE - NS ED NURSE REASSESS COMMENT FT1
Patient escorted out by [facility] because refused to accept discharge after clearance by Psych.

## 2022-06-03 NOTE — BH INPATIENT PSYCHIATRY PROGRESS NOTE - NSBHCONSULTIPREASON_PSY_A_CORE
Impression: Keratoconjunctivitis sicca, bilateral Plan: Patient to continue artificial tears, increase to  QID OU.
Impression: Nonexudative macular degeneration, early dry stage, bilateral Plan: MAC OCT done today. Discussed condition with patient. Patient to start  ARED's and check Amsler grid regularly with correction. Discussed importance of checking Amsler grid and taking vitamins. Patient should return to office if any sudden changes in vision, otherwise yearly exams. 

MAC OCT 
drusen/outer retinal abnormalities OU
Impression: Presence of intraocular lens ; OU Plan: Well positioned PC IOL(s).
Impression: Vitreous degeneration, bilateral: H43.813. Plan: Appears stable, monitor.
danger to self; mental illness expected to respond to inpatient care

## 2022-07-03 NOTE — BH INPATIENT PSYCHIATRY PROGRESS NOTE - NSCGIIMPROVESX_PSY_ALL_CORE
none
3 = Minimally improved - slightly better with little or no clinically meaningful reduction of symptoms.  Represents very little change in basic clinical status, level of care, or functional capacity.

## 2022-07-14 NOTE — BH DISCHARGE NOTE NURSING/SOCIAL WORK/PSYCH REHAB - NSBHREFERPURPOSE1_PSY_ALL_CORE
Blair Lopez Jr. is a 70 y.o. male who presents with Diabetes            HPI     Patient returns for follow-up of his medical problems.  He is accompanied by his wife.    For his diabetes mellitus type 2 with microalbuminuria, patient continues to take metformin, Jardiance, glipizide.  He did his blood work through [facility] and we did not get yet the results.  The last hemoglobin A1c 5 months ago was 6.9.    For the microalbuminuria, patient is on lisinopril.    In terms of his hypertension, he continues with good control of the blood pressure without side effects.    He continues to take atorvastatin for dyslipidemia without myalgias.    I sent him for additional work-up for polycythemia with his hemoglobin of 18.5 and hematocrit of 53.4.  Patient was a smoker for 32 years half a pack per day and quit in 1988.    I also referred him for ongoing liver enzyme elevation.  This was first diagnosed when he was taking TB meds for active TB in 2015.  He has been worked up with a GI specialist at that time with negative viral hepatitis, negative JACIEL and F-actin.  Abdominal ultrasound did not show any abnormal findings.  Patient consumes alcohol occasionally.  The liver enzymes has stayed elevated even if he has been off INH and rifampin for years.  He had CT of the chest done in November 2021 that showed findings consistent with liver cirrhosis.  He has not made appointment with a GI specialist.  We got a letter from the GI specialist stating that they could not get hold of him after multiple attempts.    I referred him to the pulmonary specialist because of increase in the size of the left lower lobe nodule from 4 mm to 6 mm and findings consistent with bronchiectasis probably related to his previous pulmonary tuberculosis.  He was seen and evaluated by the pulmonary specialist and he was sent for sputum collection to check for bacterial as well as AFB infection.  He has not done these yet.    Past medical  "history, past surgical history, family history reviewed-no changes    Social history reviewed-no changes    Allergies reviewed-no changes    Medications reviewed-no changes        ROS     As per HPI, the rest are negative.         Objective     /70 (BP Location: Left arm, Patient Position: Sitting, BP Cuff Size: Adult)   Pulse 80   Temp 36.4 °C (97.6 °F) (Temporal)   Ht 1.664 m (5' 5.5\")   Wt 68.1 kg (150 lb 2.1 oz)   SpO2 95%   BMI 24.60 kg/m²      Physical Exam     Examined alert, awake, oriented, not in distress    Neck-supple, no lymphadenopathy, no thyromegaly  Lungs-clear to auscultation, no rales, no wheezes  Heart-regular rate and rhythm, no murmur  Extremities-no edema, clubbing, cyanosis       Labs not yet available at the time of the visit.                Assessment & Plan        1. Type 2 diabetes mellitus with microalbuminuria, without long-term current use of insulin (HCC)  We will wait for the results from [facility].  I will get back with patient once I get the results with the last hemoglobin A1c was 6.95 months ago.  Continue current meds which are Jardiance, metformin and glipizide.    2. Microalbuminuria  Continue lisinopril.    3. Essential hypertension  Controlled on lisinopril.    4. Dyslipidemia  Last lipid panel was at [facility].  Continue atorvastatin.    5. Polycythemia  I am waiting for the result of the erythropoietin to work-up polycythemia.    6. Elevated liver enzymes  Ongoing elevation of the liver enzymes even if he has been off the INH and rifampin years ago.  CAT scan of the chest showed findings consistent with liver cirrhosis.  Advised to call GI specialist office to schedule the appointment.    7. Pulmonary nodule  His CAT scan showed increase in the size of pulmonary nodule and findings consistent with bronchiectasis.  His pulmonary specialist wants him to do sputum collection to check for bacterial infection and AFB.  Advised the patient to get these done as soon as " possible.    Made aware that I am leaving the practice to relocate to [place].  He will establish care with Thee Villalpando PA-C in November.      Please note that this dictation was created using voice recognition software. I have worked with consultants from the vendor as well as technical experts from [facility] to optimize the interface. I have made every reasonable attempt to correct obvious errors, but I expect that there are errors of grammar and possibly content I did not discover before finalizing the note.                   Mental Health Treatment/Substance Use Disorder Treatment

## 2022-07-16 ENCOUNTER — HOSPITAL ENCOUNTER (INPATIENT)
Dept: HOSPITAL 74 - YASAS | Age: 47
LOS: 5 days | Discharge: HOME | DRG: 897 | End: 2022-07-21
Attending: SURGERY | Admitting: ALLERGY & IMMUNOLOGY
Payer: COMMERCIAL

## 2022-07-16 VITALS — BODY MASS INDEX: 32.5 KG/M2

## 2022-07-16 DIAGNOSIS — F41.9: ICD-10-CM

## 2022-07-16 DIAGNOSIS — G89.29: ICD-10-CM

## 2022-07-16 DIAGNOSIS — F17.210: ICD-10-CM

## 2022-07-16 DIAGNOSIS — F42.9: ICD-10-CM

## 2022-07-16 DIAGNOSIS — F10.230: Primary | ICD-10-CM

## 2022-07-16 DIAGNOSIS — F19.24: ICD-10-CM

## 2022-07-16 DIAGNOSIS — G62.9: ICD-10-CM

## 2022-07-16 DIAGNOSIS — F19.282: ICD-10-CM

## 2022-07-16 DIAGNOSIS — M54.50: ICD-10-CM

## 2022-07-16 DIAGNOSIS — M19.042: ICD-10-CM

## 2022-07-16 DIAGNOSIS — K21.9: ICD-10-CM

## 2022-07-16 DIAGNOSIS — M19.041: ICD-10-CM

## 2022-07-16 DIAGNOSIS — R74.01: ICD-10-CM

## 2022-07-16 DIAGNOSIS — F14.20: ICD-10-CM

## 2022-07-16 DIAGNOSIS — I10: ICD-10-CM

## 2022-07-16 DIAGNOSIS — F32.A: ICD-10-CM

## 2022-07-16 DIAGNOSIS — R73.03: ICD-10-CM

## 2022-07-16 LAB
APPEARANCE UR: CLEAR
BILIRUB UR STRIP.AUTO-MCNC: NEGATIVE MG/DL
COLOR UR: YELLOW
KETONES UR QL STRIP: NEGATIVE
LEUKOCYTE ESTERASE UR QL STRIP.AUTO: NEGATIVE
NITRITE UR QL STRIP: NEGATIVE
PH UR: 5.5 [PH] (ref 5–8)
PROT UR QL STRIP: NEGATIVE
PROT UR QL STRIP: NEGATIVE
SP GR UR: 1.02 (ref 1.01–1.03)
UROBILINOGEN UR STRIP-MCNC: 0.2 MG/DL (ref 0.2–1)

## 2022-07-16 PROCEDURE — U0003 INFECTIOUS AGENT DETECTION BY NUCLEIC ACID (DNA OR RNA); SEVERE ACUTE RESPIRATORY SYNDROME CORONAVIRUS 2 (SARS-COV-2) (CORONAVIRUS DISEASE [COVID-19]), AMPLIFIED PROBE TECHNIQUE, MAKING USE OF HIGH THROUGHPUT TECHNOLOGIES AS DESCRIBED BY CMS-2020-01-R: HCPCS

## 2022-07-16 PROCEDURE — HZ2ZZZZ DETOXIFICATION SERVICES FOR SUBSTANCE ABUSE TREATMENT: ICD-10-PCS | Performed by: SURGERY

## 2022-07-16 PROCEDURE — U0005 INFEC AGEN DETEC AMPLI PROBE: HCPCS

## 2022-07-16 RX ADMIN — HYDROXYZINE PAMOATE SCH MG: 25 CAPSULE ORAL at 22:36

## 2022-07-16 RX ADMIN — Medication SCH MG: at 22:36

## 2022-07-16 RX ADMIN — SULFAMETHOXAZOLE AND TRIMETHOPRIM SCH EACH: 800; 160 TABLET ORAL at 17:05

## 2022-07-16 RX ADMIN — HYDROXYZINE PAMOATE SCH: 25 CAPSULE ORAL at 16:57

## 2022-07-16 RX ADMIN — HYDROXYZINE PAMOATE SCH: 25 CAPSULE ORAL at 17:00

## 2022-07-16 RX ADMIN — SULFAMETHOXAZOLE AND TRIMETHOPRIM SCH EACH: 800; 160 TABLET ORAL at 22:36

## 2022-07-17 LAB
ALBUMIN SERPL-MCNC: 3.4 G/DL (ref 3.4–5)
ALP SERPL-CCNC: 85 U/L (ref 45–117)
ALT SERPL-CCNC: 35 U/L (ref 13–61)
ANION GAP SERPL CALC-SCNC: 10 MMOL/L (ref 8–16)
AST SERPL-CCNC: 21 U/L (ref 15–37)
BILIRUB SERPL-MCNC: 0.4 MG/DL (ref 0.2–1)
BUN SERPL-MCNC: 14.8 MG/DL (ref 7–18)
CALCIUM SERPL-MCNC: 8.8 MG/DL (ref 8.5–10.1)
CHLORIDE SERPL-SCNC: 107 MMOL/L (ref 98–107)
CO2 SERPL-SCNC: 26 MMOL/L (ref 21–32)
CREAT SERPL-MCNC: 1 MG/DL (ref 0.55–1.3)
DEPRECATED RDW RBC AUTO: 13.5 % (ref 11.9–15.9)
GLUCOSE SERPL-MCNC: 95 MG/DL (ref 74–106)
HCT VFR BLD CALC: 40.4 % (ref 35.4–49)
HGB BLD-MCNC: 13.9 GM/DL (ref 11.7–16.9)
MCH RBC QN AUTO: 29.2 PG (ref 25.7–33.7)
MCHC RBC AUTO-ENTMCNC: 34.4 G/DL (ref 32–35.9)
MCV RBC: 84.9 FL (ref 80–96)
PLATELET # BLD AUTO: 183 10^3/UL (ref 134–434)
PMV BLD: 10.3 FL (ref 7.5–11.1)
PROT SERPL-MCNC: 6.4 G/DL (ref 6.4–8.2)
RBC # BLD AUTO: 4.76 M/MM3 (ref 4–5.6)
SODIUM SERPL-SCNC: 142 MMOL/L (ref 136–145)
WBC # BLD AUTO: 5.7 K/MM3 (ref 4–10)

## 2022-07-17 RX ADMIN — HYDROXYZINE PAMOATE SCH MG: 25 CAPSULE ORAL at 22:11

## 2022-07-17 RX ADMIN — ONDANSETRON PRN MG: 4 TABLET, ORALLY DISINTEGRATING ORAL at 17:31

## 2022-07-17 RX ADMIN — SULFAMETHOXAZOLE AND TRIMETHOPRIM SCH EACH: 800; 160 TABLET ORAL at 10:29

## 2022-07-17 RX ADMIN — HYDROXYZINE PAMOATE SCH MG: 25 CAPSULE ORAL at 05:39

## 2022-07-17 RX ADMIN — SULFAMETHOXAZOLE AND TRIMETHOPRIM SCH EACH: 800; 160 TABLET ORAL at 22:11

## 2022-07-17 RX ADMIN — QUETIAPINE FUMARATE SCH MG: 100 TABLET ORAL at 22:11

## 2022-07-17 RX ADMIN — HYDROXYZINE PAMOATE SCH: 25 CAPSULE ORAL at 17:30

## 2022-07-17 RX ADMIN — Medication SCH MG: at 22:11

## 2022-07-17 RX ADMIN — HYDROXYZINE PAMOATE SCH: 25 CAPSULE ORAL at 10:29

## 2022-07-17 RX ADMIN — Medication SCH TAB: at 10:29

## 2022-07-17 RX ADMIN — HYDROXYZINE PAMOATE SCH: 25 CAPSULE ORAL at 15:08

## 2022-07-18 RX ADMIN — HYDROXYZINE PAMOATE SCH: 25 CAPSULE ORAL at 14:10

## 2022-07-18 RX ADMIN — GABAPENTIN SCH MG: 400 CAPSULE ORAL at 14:09

## 2022-07-18 RX ADMIN — GABAPENTIN SCH MG: 400 CAPSULE ORAL at 22:17

## 2022-07-18 RX ADMIN — Medication SCH MG: at 22:17

## 2022-07-18 RX ADMIN — SULFAMETHOXAZOLE AND TRIMETHOPRIM SCH EACH: 800; 160 TABLET ORAL at 22:17

## 2022-07-18 RX ADMIN — HYDROXYZINE PAMOATE SCH MG: 25 CAPSULE ORAL at 05:43

## 2022-07-18 RX ADMIN — ONDANSETRON PRN MG: 4 TABLET, ORALLY DISINTEGRATING ORAL at 17:58

## 2022-07-18 RX ADMIN — SULFAMETHOXAZOLE AND TRIMETHOPRIM SCH EACH: 800; 160 TABLET ORAL at 10:35

## 2022-07-18 RX ADMIN — HYDROXYZINE PAMOATE SCH: 25 CAPSULE ORAL at 17:54

## 2022-07-18 RX ADMIN — Medication SCH TAB: at 10:35

## 2022-07-18 RX ADMIN — QUETIAPINE FUMARATE SCH MG: 100 TABLET ORAL at 22:17

## 2022-07-18 RX ADMIN — HYDROXYZINE PAMOATE SCH MG: 25 CAPSULE ORAL at 10:35

## 2022-07-18 RX ADMIN — HYDROXYZINE PAMOATE SCH: 25 CAPSULE ORAL at 22:18

## 2022-07-19 RX ADMIN — HYDROXYZINE PAMOATE SCH: 25 CAPSULE ORAL at 05:50

## 2022-07-19 RX ADMIN — HYDROXYZINE PAMOATE SCH MG: 25 CAPSULE ORAL at 05:48

## 2022-07-19 RX ADMIN — SULFAMETHOXAZOLE AND TRIMETHOPRIM SCH EACH: 800; 160 TABLET ORAL at 22:02

## 2022-07-19 RX ADMIN — GABAPENTIN SCH MG: 400 CAPSULE ORAL at 05:48

## 2022-07-19 RX ADMIN — GABAPENTIN SCH MG: 400 CAPSULE ORAL at 14:42

## 2022-07-19 RX ADMIN — Medication SCH TAB: at 10:19

## 2022-07-19 RX ADMIN — HYDROXYZINE PAMOATE SCH MG: 25 CAPSULE ORAL at 14:42

## 2022-07-19 RX ADMIN — QUETIAPINE FUMARATE SCH MG: 100 TABLET ORAL at 22:03

## 2022-07-19 RX ADMIN — GABAPENTIN SCH MG: 400 CAPSULE ORAL at 22:03

## 2022-07-19 RX ADMIN — HYDROXYZINE PAMOATE SCH: 25 CAPSULE ORAL at 10:19

## 2022-07-19 RX ADMIN — HYDROXYZINE PAMOATE SCH: 25 CAPSULE ORAL at 17:35

## 2022-07-19 RX ADMIN — Medication SCH MG: at 22:04

## 2022-07-19 RX ADMIN — SULFAMETHOXAZOLE AND TRIMETHOPRIM SCH EACH: 800; 160 TABLET ORAL at 10:19

## 2022-07-19 RX ADMIN — HYDROXYZINE PAMOATE SCH: 25 CAPSULE ORAL at 22:03

## 2022-07-19 RX ADMIN — Medication SCH MG: at 22:03

## 2022-07-20 RX ADMIN — HYDROXYZINE PAMOATE SCH: 25 CAPSULE ORAL at 19:08

## 2022-07-20 RX ADMIN — HYDROXYZINE PAMOATE SCH: 25 CAPSULE ORAL at 10:13

## 2022-07-20 RX ADMIN — Medication SCH MG: at 22:32

## 2022-07-20 RX ADMIN — GABAPENTIN SCH MG: 400 CAPSULE ORAL at 06:01

## 2022-07-20 RX ADMIN — GABAPENTIN SCH MG: 400 CAPSULE ORAL at 15:00

## 2022-07-20 RX ADMIN — HYDROXYZINE PAMOATE SCH: 25 CAPSULE ORAL at 22:31

## 2022-07-20 RX ADMIN — HYDROXYZINE PAMOATE SCH: 25 CAPSULE ORAL at 15:00

## 2022-07-20 RX ADMIN — HYDROXYZINE PAMOATE SCH MG: 25 CAPSULE ORAL at 06:01

## 2022-07-20 RX ADMIN — Medication SCH TAB: at 10:12

## 2022-07-20 RX ADMIN — QUETIAPINE FUMARATE SCH MG: 100 TABLET ORAL at 22:32

## 2022-07-20 RX ADMIN — GABAPENTIN SCH MG: 400 CAPSULE ORAL at 22:32

## 2022-07-20 RX ADMIN — SULFAMETHOXAZOLE AND TRIMETHOPRIM SCH EACH: 800; 160 TABLET ORAL at 10:12

## 2022-07-20 RX ADMIN — SULFAMETHOXAZOLE AND TRIMETHOPRIM SCH EACH: 800; 160 TABLET ORAL at 22:31

## 2022-07-21 VITALS — DIASTOLIC BLOOD PRESSURE: 77 MMHG | HEART RATE: 66 BPM | TEMPERATURE: 96.4 F | SYSTOLIC BLOOD PRESSURE: 129 MMHG

## 2022-07-21 RX ADMIN — GABAPENTIN SCH MG: 400 CAPSULE ORAL at 06:30

## 2022-07-21 RX ADMIN — HYDROXYZINE PAMOATE SCH: 25 CAPSULE ORAL at 10:29

## 2022-07-21 RX ADMIN — HYDROXYZINE PAMOATE SCH: 25 CAPSULE ORAL at 06:31

## 2022-07-21 RX ADMIN — SULFAMETHOXAZOLE AND TRIMETHOPRIM SCH EACH: 800; 160 TABLET ORAL at 10:28

## 2022-07-21 RX ADMIN — Medication SCH TAB: at 10:29

## 2022-08-21 ENCOUNTER — EMERGENCY (EMERGENCY)
Facility: HOSPITAL | Age: 47
LOS: 1 days | Discharge: HOME | End: 2022-08-21
Attending: EMERGENCY MEDICINE | Admitting: EMERGENCY MEDICINE
Payer: MEDICARE

## 2022-08-21 VITALS
SYSTOLIC BLOOD PRESSURE: 160 MMHG | TEMPERATURE: 98 F | OXYGEN SATURATION: 96 % | HEART RATE: 89 BPM | RESPIRATION RATE: 17 BRPM | HEIGHT: 73 IN | WEIGHT: 265 LBS | DIASTOLIC BLOOD PRESSURE: 70 MMHG

## 2022-08-21 PROCEDURE — 82962 GLUCOSE BLOOD TEST: CPT

## 2022-08-21 PROCEDURE — 99282 EMERGENCY DEPT VISIT SF MDM: CPT

## 2022-08-21 PROCEDURE — 99283 EMERGENCY DEPT VISIT LOW MDM: CPT | Mod: FS

## 2022-08-21 NOTE — ED PROVIDER NOTE - OBJECTIVE STATEMENT
47yo m with pmh of htn, gerd, DM, depression requesting detox. Pt states he drinks almost daily, last drank 1 pint of scotch and 6 beers at 9am this morning. Admits to some nausea but eating helps. Denies fever, chills, dizziness, tremors, vomits, SI/HI, hallucinations. Pt requesting food. 47yo m with pmh of htn, gerd, DM, depression requesting detox. Pt states he drinks almost daily, last drank 1 pint of scotch and 6 beers at 9am this morning. Admits to some nausea but eating helps. Denies fever, chills, dizziness, tremors, vomits, SI/HI, hallucinations. No trauma. No ivdu. Pt requesting food.

## 2022-08-21 NOTE — ED PROVIDER NOTE - CLINICAL SUMMARY MEDICAL DECISION MAKING FREE TEXT BOX
45yo m with pmh of htn, gerd, DM, depression requesting detox. Pt states he drinks almost daily, last drank 1 pint of scotch and 6 beers at 9am this morning. Admits to some nausea but eating helps. Denies fever, chills, dizziness, tremors, vomits, SI/HI, hallucinations. Pt requesting food. Well appearing, not in withdrawl. Pt given list of detox centers. Tolerating PO. 47yo m with pmh of htn, gerd, DM, depression requesting detox. Pt states he drinks almost daily, last drank 1 pint of scotch and 6 beers at 9am this morning. Admits to some nausea but eating helps. Denies fever, chills, dizziness, tremors, vomits, SI/HI, hallucinations. No trauma. No ivdu. Pt requesting food. Well appearing, not in withdrawal. Pt given list of detox centers. Tolerating PO.

## 2022-08-21 NOTE — ED ADULT TRIAGE NOTE - CHIEF COMPLAINT QUOTE
"I'm here for alcohol detox," reports etoh use 3-4x/week. Last drank 1 pint of scotch, 6 beers at 9am. Reports headache, nausea at this time.

## 2022-08-21 NOTE — ED ADULT NURSE NOTE - OBJECTIVE STATEMENT
Pt with pmx of alcohol abuse and depression walked in requesting food and referral for detox centers. AOX4. VSS.  Patient denies chest pain, discomfort, shortness of breath, difficulty breathing, hearing voices, suicidal thoughts and any form of distress not noted. Patient oriented to ED area. All needs attended. Pt on cardiac monitor. Rounding in progress. Fall risk precautions maintained.

## 2022-08-21 NOTE — ED PROVIDER NOTE - PATIENT PORTAL LINK FT
You can access the FollowMyHealth Patient Portal offered by [facility] by registering at the following website: [email or web address]. By joining [employer]’s FollowMyHealth portal, you will also be able to view your health information using other applications (apps) compatible with our system.

## 2022-08-22 DIAGNOSIS — F10.20 ALCOHOL DEPENDENCE, UNCOMPLICATED: ICD-10-CM

## 2022-08-22 DIAGNOSIS — K21.9 GASTRO-ESOPHAGEAL REFLUX DISEASE WITHOUT ESOPHAGITIS: ICD-10-CM

## 2022-08-22 DIAGNOSIS — F32.A DEPRESSION, UNSPECIFIED: ICD-10-CM

## 2022-08-22 DIAGNOSIS — E11.9 TYPE 2 DIABETES MELLITUS WITHOUT COMPLICATIONS: ICD-10-CM

## 2022-08-22 DIAGNOSIS — I10 ESSENTIAL (PRIMARY) HYPERTENSION: ICD-10-CM

## 2022-08-22 DIAGNOSIS — R11.0 NAUSEA: ICD-10-CM

## 2022-08-22 DIAGNOSIS — Z91.010 ALLERGY TO PEANUTS: ICD-10-CM

## 2022-11-04 ENCOUNTER — EMERGENCY (EMERGENCY)
Facility: HOSPITAL | Age: 47
LOS: 1 days | Discharge: HOME | End: 2022-11-04
Attending: EMERGENCY MEDICINE | Admitting: EMERGENCY MEDICINE
Payer: MEDICARE

## 2022-11-04 VITALS
HEART RATE: 66 BPM | HEIGHT: 72 IN | SYSTOLIC BLOOD PRESSURE: 128 MMHG | TEMPERATURE: 98 F | OXYGEN SATURATION: 97 % | DIASTOLIC BLOOD PRESSURE: 77 MMHG | RESPIRATION RATE: 18 BRPM | WEIGHT: 250 LBS

## 2022-11-04 VITALS
HEART RATE: 74 BPM | DIASTOLIC BLOOD PRESSURE: 81 MMHG | OXYGEN SATURATION: 98 % | SYSTOLIC BLOOD PRESSURE: 125 MMHG | RESPIRATION RATE: 17 BRPM | TEMPERATURE: 98 F

## 2022-11-04 PROCEDURE — 82962 GLUCOSE BLOOD TEST: CPT

## 2022-11-04 PROCEDURE — 99283 EMERGENCY DEPT VISIT LOW MDM: CPT

## 2022-11-04 PROCEDURE — 99282 EMERGENCY DEPT VISIT SF MDM: CPT

## 2022-11-04 NOTE — ED ADULT TRIAGE NOTE - CHIEF COMPLAINT QUOTE
Pt presents to ED c/o withdrawal symptoms. Endorses daily ETOH and cocaine use. Last drink / drug use at 11pm yesterday. +nausea, lightheadedness, shakiness. Denies hx of seizures, DTs. Denies SI/HI.

## 2022-11-04 NOTE — ED PROVIDER NOTE - CLINICAL SUMMARY MEDICAL DECISION MAKING FREE TEXT BOX
Pt requesting detox, no evidence of withdrawal, ambulating steady, SW discussed with pt and given detox info.

## 2022-11-04 NOTE — ED ADULT NURSE NOTE - NSIMPLEMENTINTERV_GEN_ALL_ED
Implemented All Universal Safety Interventions:  [place] to call system. Call bell, personal items and telephone within reach. Instruct patient to call for assistance. Room bathroom lighting operational. Non-slip footwear when patient is off stretcher. Physically safe environment: no spills, clutter or unnecessary equipment. Stretcher in lowest position, wheels locked, appropriate side rails in place.

## 2022-11-04 NOTE — ED PROVIDER NOTE - NSFOLLOWUPINSTRUCTIONS_ED_ALL_ED_FT
[facility]  Family Medicine  [address] [place] [address]  Phone: [phone]    Stay well hydrated.  Return for fevers, persistent vomit, uncontrolled pain, worsening breathing, worsening lightheaded, worsening shaking.  Follow up with primary doctor within 1-2 days.   Alcohol is harmful to your health.    Alcohol Abuse    Alcohol intoxication occurs when the amount of alcohol that a person has consumed impairs his or her ability to mentally and physically function. Chronic alcohol consumption can also lead to a variety of health issues including neurological disease, stomach disease, heart disease, liver disease, etc. Do not drive after drinking alcohol. Drinking enough alcohol to end up in an Emergency Room suggests you may have an alcohol abuse problem. Seek help at a drug addiction center.    SEEK IMMEDIATE MEDICAL CARE IF YOU HAVE ANY OF THE FOLLOWING SYMPTOMS: seizures, vomiting blood, blood in your stool, lightheadedness/dizziness, or becoming shaky to tremulous when you stop drinking.

## 2022-11-04 NOTE — ED PROVIDER NOTE - PATIENT PORTAL LINK FT
You can access the FollowMyHealth Patient Portal offered by [facility] by registering at the following website: [email or web address]. By joining [employer]’s FollowMyHealth portal, you will also be able to view your health information using other applications (apps) compatible with our system.

## 2022-11-04 NOTE — ED PROVIDER NOTE - OBJECTIVE STATEMENT
48 yo hx of htn, gerd, DM, depression requesting detox. Pt states drinks daily, last drink at 11p 46 yo hx of htn, gerd, DM, depression requesting detox. Pt states drinks daily, last drink at 11p, no seizures, tremors, abd pain, n/v/d, SIHI, AH/VH or any other acute complaints.

## 2022-11-04 NOTE — ED ADULT NURSE NOTE - OBJECTIVE STATEMENT
Pt present to ED endorsing withdrawal symptoms from etoh, cocaine, and heroin. Last use yesterday 11pm. States "I am going through withdrawal and I would like detox."

## 2022-11-07 DIAGNOSIS — Z91.010 ALLERGY TO PEANUTS: ICD-10-CM

## 2022-11-07 DIAGNOSIS — F19.10 OTHER PSYCHOACTIVE SUBSTANCE ABUSE, UNCOMPLICATED: ICD-10-CM

## 2022-11-07 DIAGNOSIS — K21.9 GASTRO-ESOPHAGEAL REFLUX DISEASE WITHOUT ESOPHAGITIS: ICD-10-CM

## 2022-11-07 DIAGNOSIS — E11.9 TYPE 2 DIABETES MELLITUS WITHOUT COMPLICATIONS: ICD-10-CM

## 2022-11-07 DIAGNOSIS — F10.230 ALCOHOL DEPENDENCE WITH WITHDRAWAL, UNCOMPLICATED: ICD-10-CM

## 2022-11-07 DIAGNOSIS — F41.9 ANXIETY DISORDER, UNSPECIFIED: ICD-10-CM

## 2022-11-07 DIAGNOSIS — F25.0 SCHIZOAFFECTIVE DISORDER, BIPOLAR TYPE: ICD-10-CM

## 2022-11-07 DIAGNOSIS — I10 ESSENTIAL (PRIMARY) HYPERTENSION: ICD-10-CM

## 2022-11-07 DIAGNOSIS — F42.9 OBSESSIVE-COMPULSIVE DISORDER, UNSPECIFIED: ICD-10-CM

## 2022-11-28 NOTE — ED PROVIDER NOTE - PRINCIPAL DIAGNOSIS
Department of Anesthesiology  Preprocedure Note       Name:  German Guerrero   Age:  64 y.o.  :  1962                                          MRN:  [id number]         Date:  2022      Surgeon: * No surgeons listed *    Procedure: * No procedures listed *    Medications prior to admission:   Prior to Admission medications    Medication Sig Start Date End Date Taking? Authorizing Provider   acetaminophen (TYLENOL) 325 MG tablet Take 650 mg by mouth every 4 hours as needed for Pain or Fever   Yes Historical Provider, MD   cefUROXime (CEFTIN) 500 MG tablet Take 500 mg by mouth 2 times daily For 7 days starting 22 Yes Historical Provider, MD   glucose (GLUTOSE) 40 % GEL Take 15 g by mouth as needed (BG <70)   Yes Historical Provider, MD   guaiFENesin (MUCINEX) 600 MG extended release tablet Take 600 mg by mouth 2 times daily   Yes Historical Provider, MD   isosorbide mononitrate (IMDUR) 30 MG extended release tablet Take 30 mg by mouth daily   Yes Historical Provider, MD   LORazepam (ATIVAN) 0.5 MG tablet Take 0.5 mg by mouth every 4 hours as needed for Anxiety (or respiratory discomfort). Yes Historical Provider, MD   metoprolol tartrate (LOPRESSOR) 25 MG tablet Take 25 mg by mouth 2 times daily   Yes Historical Provider, MD   morphine sulfate 20 MG/ML concentrated oral solution Take 5 mg by mouth every hour as needed for Pain (or respiratory discomfort).    Yes Historical Provider, MD   nicotine (NICODERM CQ) 21 MG/24HR Place 1 patch onto the skin every 24 hours   Yes Historical Provider, MD   omeprazole (PRILOSEC) 40 MG delayed release capsule Take 40 mg by mouth daily   Yes Historical Provider, MD   potassium chloride (KLOR-CON M) 20 MEQ extended release tablet Take 20 mEq by mouth 2 times daily   Yes Historical Provider, MD   predniSONE (DELTASONE) 20 MG tablet Take 20 mg by mouth daily   Yes Historical Provider, MD   budesonide (PULMICORT) 0.5 MG/2ML nebulizer suspension Take 1 ampule by nebulization 2 times daily   Yes Historical Provider, MD   senna (SENOKOT) 8.6 MG tablet Take 1 tablet by mouth 2 times daily as needed for Constipation   Yes Historical Provider, MD snidernosides-docusate sodium (SENOKOT-S) 8.6-50 MG tablet Take 2 tablets by mouth in the morning and at bedtime   Yes Historical Provider, MD   lisinopril (PRINIVIL;ZESTRIL) 10 MG tablet Take 1 tablet by mouth daily 11/14/22   Alexandria Macias MD   dilTIAZem (CARDIZEM 12 HR) 120 MG extended release capsule Take 240 mg by mouth daily 10/17/22   Historical Provider, MD   hydrOXYzine HCl (ATARAX) 25 MG tablet Take 1 tablet by mouth at bedtime 10/27/22   Alexandria Macias MD   TRELEGY ELLIPTA 200-62.5-25 MCG/INH AEPB INHALE 1 PUFF INTO THE LUNGS DAILY 10/6/22   Yanira Maya MD   albuterol sulfate HFA (PROAIR HFA) 108 (90 Base) MCG/ACT inhaler Inhale 2 puffs into the lungs every 6 hours as needed for Wheezing 6/13/22   JAEL Hwang CNP       Current medications:    Current Facility-Administered Medications   Medication Dose Route Frequency Provider Last Rate Last Admin    norepinephrine (LEVOPHED) 16 mg in sodium chloride 0.9 % 250 mL infusion  1-100 mcg/min IntraVENous Continuous Porfirio Carranza MD        propofol injection  5-50 mcg/kg/min IntraVENous Continuous JAEL Dixon CNP        folic acid 1 mg, thiamine (B-1) 100 mg in sodium chloride 0.9 % 50 mL IVPB   IntraVENous Daily JAEL Dixon CNP        dextromethorphan-guaiFENesin (ROBITUSSIN-DM)  MG/5ML liquid 5 mL  5 mL Oral Q4H PRN Amy Vang MD   5 mL at 11/27/22 1440    LORazepam (ATIVAN) tablet 0.5 mg  0.5 mg Oral Q4H PRN Amy Vang MD   0.5 mg at 11/27/22 1552    dexmedetomidine (PRECEDEX) 400 mcg in sodium chloride 0.9 % 100 mL infusion  0.1-1.5 mcg/kg/hr IntraVENous Continuous Porfirio Carranza MD 18 mL/hr at 11/28/22 1036 0.8 mcg/kg/hr at 11/28/22 1036    dilTIAZem (CARDIZEM 12 HR) extended release capsule 120 mg  120 mg Oral Daily Polo Askew MD   120 mg at 11/27/22 1023    nicotine (NICODERM CQ) 14 MG/24HR 1 patch  1 patch TransDERmal Daily Polo Askew MD   1 patch at 11/28/22 0910    oxyCODONE-acetaminophen (PERCOCET) 5-325 MG per tablet 1 tablet  1 tablet Oral Q4H PRN Wendy Park MD   1 tablet at 11/27/22 1024    methylPREDNISolone sodium (SOLU-MEDROL) injection 40 mg  40 mg IntraVENous Q12H Wendy Park MD   40 mg at 11/27/22 1552    potassium chloride (KLOR-CON M) extended release tablet 40 mEq  40 mEq Oral PRN Wendy Park MD   40 mEq at 11/24/22 1332    Or    potassium bicarb-citric acid (EFFER-K) effervescent tablet 40 mEq  40 mEq Oral PRN Wendy Park MD   40 mEq at 11/26/22 0856    Or    potassium chloride 10 mEq/100 mL IVPB (Peripheral Line)  10 mEq IntraVENous PRN Wendy Park MD        metoprolol tartrate (LOPRESSOR) tablet 25 mg  25 mg Oral BID Wendy Park MD   25 mg at 11/27/22 2342    lisinopril (PRINIVIL;ZESTRIL) tablet 10 mg  10 mg Oral Daily Wendy Park MD   10 mg at 11/27/22 1024    metoprolol (LOPRESSOR) injection 5 mg  5 mg IntraVENous Q10 Min PRN JAEL Robin - CNP   5 mg at 11/23/22 1405    sodium chloride flush 0.9 % injection 5-40 mL  5-40 mL IntraVENous 2 times per day Zen Bland MD   10 mL at 11/28/22 0911    sodium chloride flush 0.9 % injection 5-40 mL  5-40 mL IntraVENous PRN Zen Bland MD        0.9 % sodium chloride infusion   IntraVENous PRN Zen Bland MD        ipratropium-albuterol (DUONEB) nebulizer solution 1 ampule  1 ampule Inhalation Q20 Min PRN Zen Balnd MD   1 ampule at 11/27/22 1240    vancomycin (VANCOCIN) intermittent dosing (placeholder)   Other RX Placeholder Zen Bland MD        sodium chloride flush 0.9 % injection 5-40 mL  5-40 mL IntraVENous 2 times per day Zen Bland MD   10 mL at 11/28/22 0910    sodium chloride flush 0.9 % injection 5-40 mL  5-40 mL IntraVENous PRN Zen Bland MD        0.9 % sodium chloride infusion   IntraVENous PRN Luan Long MD        enoxaparin (LOVENOX) injection 40 mg  40 mg SubCUTAneous Daily Luan Long MD   40 mg at 11/27/22 1023    acetaminophen (TYLENOL) tablet 650 mg  650 mg Oral Q4H PRN Luan Long MD        ondansetron (ZOFRAN-ODT) disintegrating tablet 4 mg  4 mg Oral Q8H PRN Luan Long MD        Or    ondansetron [facility]) injection 4 mg  4 mg IntraVENous Q6H PRN Luan Long MD        vancomycin (VANCOCIN) 1,250 mg in dextrose 5 % 250 mL IVPB (ADDAVIAL)  1,250 mg IntraVENous Q12H Luan Long MD   Stopped at 11/28/22 0433    cefepime (MAXIPIME) 2,000 mg in sodium chloride 0.9 % 50 mL IVPB mini-bag  2,000 mg IntraVENous Q12H Jaime Santiago MD   Stopped at 11/27/22 1958    ipratropium-albuterol (DUONEB) nebulizer solution 1 ampule  1 ampule Inhalation Q4H Jaime Santiago MD   1 ampule at 11/28/22 0824    budesonide (PULMICORT) nebulizer suspension 500 mcg  0.5 mg Nebulization BID Jaime Santiago MD   500 mcg at 11/27/22 2012       Allergies:  No Known Allergies    Problem List:    Patient Active Problem List   Diagnosis Code    Cellulitis of b/l lower extremity L03. 116    Cellulitis of lower extremity L03.119    Alcoholism ([facility]) F10.20    Essential hypertension I10    Hyperkeratosis of b/l Soles Q82.8    Alcohol withdrawal ([facility]) F10.939    Tinea pedis of both feet B35.3    Suicidal ideation R45.851    Dyspnea R06.00    COPD exacerbation ([facility]) J44.1    Gastroesophageal reflux disease without esophagitis K21.9    Cigarette smoker F17.210    Avascular necrosis of femoral head ([facility]) M87.059    Tachycardia R00.0    Stage 3 severe COPD by GOLD classification ([facility]) J44.9    Tobacco dependence F17.200    Chronic obstructive pulmonary disease ([facility]) J44.9    Lung malignancy ([facility]) C34.90    Cough with hemoptysis R04.2    Sepsis ([facility]) A41.9    COVID-19 U07.1    Mass of lingula of lung R91.8    Hyponatremia E87.1    Respiratory failure ([facility]) J96.90 Past Medical History:        Diagnosis Date    Alcohol abuse     hx of alcoholism; States he drinks 5 beers per day; pt has D/T's    Avascular necrosis of femoral head ([facility] 2014    Overview:  S/p bilateral hip replacements    History of hip replacement     left/right    Hypertension     Mass of left lung     Rib fracture     Scabies     Stage 3 severe COPD by GOLD classification ([facility] [address] 2021    Tachycardia 10/28/2020    does not follow with Cardiology       Past Surgical History:        Procedure Laterality Date    NECK SURGERY      TOTAL HIP ARTHROPLASTY Bilateral     VASECTOMY         Social History:    Social History     Tobacco Use    Smoking status: Former     Packs/day: 1.00     Years: 44.00     Pack years: 44.00     Types: Cigarettes     Start date:      Quit date: 2022     Years since quittin.2    Smokeless tobacco: Never    Tobacco comments:     pt educated on both alcohol and smoking cessation   Substance Use Topics    Alcohol use:  Yes     Alcohol/week: 35.0 standard drinks     Types: 35 Cans of beer per week     Comment: hx of alcoholism; States he drinks 5 beers per day; pt has D/T's                                Counseling given: Not Answered  Tobacco comments: pt educated on both alcohol and smoking cessation      Vital Signs (Current):   Vitals:    22 0815 22 0830 22 0845 22 0904   BP: 95/66 (!) 114/103 (!) 178/86    Pulse: 69 66 87 (!) 104   Resp:    Temp:       TempSrc:       SpO2: 93% 94% 99% 99%   Weight:       Height:                                                  BP Readings from Last 3 Encounters:   22 (!) 178/86   22 106/64   22 124/77       NPO Status:                                                                                 BMI:   Wt Readings from Last 3 Encounters:   22 198 lb 13.7 oz (90.2 kg)   22 199 lb 12.8 oz (90.6 kg)   22 194 lb 3.6 oz (88.1 kg)     Body mass index is 26.24 kg/m². CBC:   Lab Results   Component Value Date/Time    WBC 14.2 11/27/2022 05:29 AM    RBC 3.01 11/27/2022 05:29 AM    HGB 11.1 11/27/2022 05:29 AM    HCT 33.7 11/27/2022 05:29 AM    .0 11/27/2022 05:29 AM    RDW 15.2 11/27/2022 05:29 AM     11/27/2022 05:29 AM       CMP:   Lab Results   Component Value Date/Time     11/27/2022 05:29 AM    K 4.6 11/27/2022 05:29 AM    CL 93 11/27/2022 05:29 AM    CO2 33 11/27/2022 05:29 AM    BUN 15 11/27/2022 05:29 AM    CREATININE 0.43 11/27/2022 05:29 AM    GFRAA >60 09/18/2022 05:45 AM    LABGLOM >60 11/27/2022 05:29 AM    GLUCOSE 116 11/27/2022 05:29 AM    PROT 6.1 09/17/2022 05:49 AM    CALCIUM 9.5 11/27/2022 05:29 AM    BILITOT 0.8 09/17/2022 05:49 AM    ALKPHOS 103 09/17/2022 05:49 AM    AST 60 09/17/2022 05:49 AM    ALT 67 09/17/2022 05:49 AM       POC Tests: No results for input(s): POCGLU, POCNA, POCK, POCCL, POCBUN, POCHEMO, POCHCT in the last 72 hours.     Coags:   Lab Results   Component Value Date/Time    PROTIME 12.1 09/13/2022 05:45 PM    INR 0.9 09/13/2022 05:45 PM    APTT 30.3 08/02/2022 11:04 AM       HCG (If Applicable): No results found for: PREGTESTUR, PREGSERUM, HCG, HCGQUANT     ABGs:   Lab Results   Component Value Date/Time    PHART 7.301 11/28/2022 01:10 AM    PO2ART 76.5 11/28/2022 01:10 AM    BWP3MLE 79.4 11/28/2022 01:10 AM    BXY0SLW 39.1 11/28/2022 01:10 AM    P3HPVYWX 92.0 11/28/2022 01:10 AM        Type & Screen (If Applicable):  No results found for: LABABO, LABRH    Drug/Infectious Status (If Applicable):  No results found for: HIV, HEPCAB    COVID-19 Screening (If Applicable):   Lab Results   Component Value Date/Time    COVID19 Not Detected 11/22/2022 01:02 PM    COVID19 DETECTED 08/29/2022 07:42 AM           Anesthesia Evaluation  Patient summary reviewed and Nursing notes reviewed no history of anesthetic complications:   Airway: Mallampati: III  TM distance: >3 FB     Mouth opening: > = 3 FB   Dental: (+) edentulous      Pulmonary:normal exam  breath sounds clear to auscultation  (+) COPD: no interval change,  shortness of breath: no interval change,      (-) pneumonia, asthma, recent URI, sleep apnea, rhonchi, wheezes, rales, stridor, not a current smoker and no decreased breath sounds          Patient did not smoke on day of surgery. Cardiovascular:  Exercise tolerance: poor (<4 METS),   (+) hypertension: no interval change,     (-) pacemaker, valvular problems/murmurs, past MI, CAD, CABG/stent, dysrhythmias,  angina,  CHF, orthopnea, PND,  ANDRES, murmur, weak pulses,  friction rub, systolic click, carotid bruit,  JVD, peripheral edema, no pulmonary hypertension and no hyperlipidemia    ECG reviewed  Rhythm: regular  Rate: normal  Echocardiogram reviewed         Beta Blocker:  Dose within 24 Hrs         Neuro/Psych:   (+) psychiatric history:   (-) seizures, neuromuscular disease, TIA, CVA, headaches and depression/anxiety            GI/Hepatic/Renal:   (+) GERD: no interval change,      (-) hiatal hernia, PUD, hepatitis, liver disease, no renal disease, bowel prep and no morbid obesity       Endo/Other: Negative Endo/Other ROS   (+) no malignancy/cancer. (-) diabetes mellitus, hypothyroidism, hyperthyroidism, blood dyscrasia, arthritis, no electrolyte abnormalities, no malignancy/cancer               Abdominal:             Vascular: negative vascular ROS. - PVD and DVT. Other Findings:           Anesthesia Plan      general     ASA 4 - emergent       Induction: intravenous.                             Dereje Levy MD   11/28/2022 Chest pain, unspecified type

## 2022-12-31 NOTE — ED BEHAVIORAL HEALTH ASSESSMENT NOTE - NSBHINFOSOURCE_PSY_ALL_CORE
Goal Outcome Evaluation:    Neuro: A&O x4. Some confusion, but more alert today. Pt walked ugalde with therapy. Pupils equal, reactive. Following commands. No restraints today.  CV: VVI pacers capped. Chest tubes, see flowsheet. Sinus tach, HR 100s. -120s. Afberile.  Pulm: RA, clear lungs.  GI: Loose BM today, holding stool meds. TF at goal, H20 flushes increase 150 q2 per team.  : Using urinal.  IV/Gtt: PIVs. Phos supplemented oral & IV today.   Denies pain, refusing any pain medication.     Plan: Transfer when medically able, biopsy/cath on Monday.   PRN trazodone ordered for tonight if pt unable to fall asleep within first hour-2 hrs per team.    Will continue to monitor for safety and comfort.    Isabelle Salas RN    Patient

## 2023-01-05 NOTE — ED PROVIDER NOTE - CPE EDP ENMT NORM
normal... Plan: Pt currently at Spironolactone 150 but still breaking out. Will increase to 200 and check a BMP before she goes back to school in [place] next week. 1 month of 200 mg sent today, will send 90 day supply to [place] once labs received. Follow up 6 mo

## 2023-07-03 NOTE — ED PROVIDER NOTE - NS_EDPROVIDERDISPOUSERTYPE_ED_A_ED
Consent: Written consent was obtained and risks were reviewed including but not limited to scarring, infection, bleeding, scabbing, incomplete removal, nerve damage and allergy to anesthesia. Attending Attestation (For Attendings USE Only)...

## 2023-09-26 ENCOUNTER — EMERGENCY (EMERGENCY)
Facility: HOSPITAL | Age: 48
LOS: 1 days | Discharge: HOME | End: 2023-09-26
Attending: STUDENT IN AN ORGANIZED HEALTH CARE EDUCATION/TRAINING PROGRAM | Admitting: STUDENT IN AN ORGANIZED HEALTH CARE EDUCATION/TRAINING PROGRAM
Payer: MEDICARE

## 2023-09-26 VITALS
RESPIRATION RATE: 18 BRPM | DIASTOLIC BLOOD PRESSURE: 81 MMHG | OXYGEN SATURATION: 95 % | TEMPERATURE: 98 F | SYSTOLIC BLOOD PRESSURE: 148 MMHG | HEART RATE: 72 BPM

## 2023-09-26 VITALS
RESPIRATION RATE: 18 BRPM | TEMPERATURE: 98 F | DIASTOLIC BLOOD PRESSURE: 79 MMHG | HEART RATE: 68 BPM | OXYGEN SATURATION: 96 % | WEIGHT: 235.89 LBS | SYSTOLIC BLOOD PRESSURE: 137 MMHG

## 2023-09-26 DIAGNOSIS — F43.25 ADJUSTMENT DISORDER WITH MIXED DISTURBANCE OF EMOTIONS AND CONDUCT: ICD-10-CM

## 2023-09-26 DIAGNOSIS — F10.90 ALCOHOL USE, UNSPECIFIED, UNCOMPLICATED: ICD-10-CM

## 2023-09-26 DIAGNOSIS — F19.94 OTHER PSYCHOACTIVE SUBSTANCE USE, UNSPECIFIED WITH PSYCHOACTIVE SUBSTANCE-INDUCED MOOD DISORDER: ICD-10-CM

## 2023-09-26 DIAGNOSIS — F60.2 ANTISOCIAL PERSONALITY DISORDER: ICD-10-CM

## 2023-09-26 DIAGNOSIS — F12.90 CANNABIS USE, UNSPECIFIED, UNCOMPLICATED: ICD-10-CM

## 2023-09-26 LAB
ANION GAP SERPL CALC-SCNC: 12 MMOL/L — SIGNIFICANT CHANGE UP (ref 5–17)
APAP SERPL-MCNC: <5 UG/ML — LOW (ref 10–30)
BASOPHILS # BLD AUTO: 0.05 K/UL — SIGNIFICANT CHANGE UP (ref 0–0.2)
BASOPHILS NFR BLD AUTO: 0.4 % — SIGNIFICANT CHANGE UP (ref 0–2)
BUN SERPL-MCNC: 16 MG/DL — SIGNIFICANT CHANGE UP (ref 7–23)
CALCIUM SERPL-MCNC: 11.4 MG/DL — HIGH (ref 8.4–10.5)
CHLORIDE SERPL-SCNC: 103 MMOL/L — SIGNIFICANT CHANGE UP (ref 96–108)
CO2 SERPL-SCNC: 26 MMOL/L — SIGNIFICANT CHANGE UP (ref 22–31)
CREAT SERPL-MCNC: 0.94 MG/DL — SIGNIFICANT CHANGE UP (ref 0.5–1.3)
EGFR: 102 ML/MIN/1.73M2 — SIGNIFICANT CHANGE UP
EOSINOPHIL # BLD AUTO: 0.05 K/UL — SIGNIFICANT CHANGE UP (ref 0–0.5)
EOSINOPHIL NFR BLD AUTO: 0.4 % — SIGNIFICANT CHANGE UP (ref 0–6)
ETHANOL SERPL-MCNC: <10 MG/DL — SIGNIFICANT CHANGE UP (ref 0–10)
GLUCOSE SERPL-MCNC: 113 MG/DL — HIGH (ref 70–99)
HCT VFR BLD CALC: 40.2 % — SIGNIFICANT CHANGE UP (ref 39–50)
HGB BLD-MCNC: 13.8 G/DL — SIGNIFICANT CHANGE UP (ref 13–17)
IMM GRANULOCYTES NFR BLD AUTO: 0.3 % — SIGNIFICANT CHANGE UP (ref 0–0.9)
LYMPHOCYTES # BLD AUTO: 1.79 K/UL — SIGNIFICANT CHANGE UP (ref 1–3.3)
LYMPHOCYTES # BLD AUTO: 15.4 % — SIGNIFICANT CHANGE UP (ref 13–44)
MCHC RBC-ENTMCNC: 29.8 PG — SIGNIFICANT CHANGE UP (ref 27–34)
MCHC RBC-ENTMCNC: 34.3 GM/DL — SIGNIFICANT CHANGE UP (ref 32–36)
MCV RBC AUTO: 86.8 FL — SIGNIFICANT CHANGE UP (ref 80–100)
MONOCYTES # BLD AUTO: 0.57 K/UL — SIGNIFICANT CHANGE UP (ref 0–0.9)
MONOCYTES NFR BLD AUTO: 4.9 % — SIGNIFICANT CHANGE UP (ref 2–14)
NEUTROPHILS # BLD AUTO: 9.16 K/UL — HIGH (ref 1.8–7.4)
NEUTROPHILS NFR BLD AUTO: 78.6 % — HIGH (ref 43–77)
NRBC # BLD: 0 /100 WBCS — SIGNIFICANT CHANGE UP (ref 0–0)
PCP SPEC-MCNC: SIGNIFICANT CHANGE UP
PLATELET # BLD AUTO: 266 K/UL — SIGNIFICANT CHANGE UP (ref 150–400)
POTASSIUM SERPL-MCNC: 3.8 MMOL/L — SIGNIFICANT CHANGE UP (ref 3.5–5.3)
POTASSIUM SERPL-SCNC: 3.8 MMOL/L — SIGNIFICANT CHANGE UP (ref 3.5–5.3)
RBC # BLD: 4.63 M/UL — SIGNIFICANT CHANGE UP (ref 4.2–5.8)
RBC # FLD: 13.7 % — SIGNIFICANT CHANGE UP (ref 10.3–14.5)
SALICYLATES SERPL-MCNC: <0.3 MG/DL — LOW (ref 2.8–20)
SODIUM SERPL-SCNC: 141 MMOL/L — SIGNIFICANT CHANGE UP (ref 135–145)
WBC # BLD: 11.66 K/UL — HIGH (ref 3.8–10.5)
WBC # FLD AUTO: 11.66 K/UL — HIGH (ref 3.8–10.5)

## 2023-09-26 PROCEDURE — 99284 EMERGENCY DEPT VISIT MOD MDM: CPT | Mod: 25

## 2023-09-26 PROCEDURE — 99285 EMERGENCY DEPT VISIT HI MDM: CPT

## 2023-09-26 PROCEDURE — 85025 COMPLETE CBC W/AUTO DIFF WBC: CPT

## 2023-09-26 PROCEDURE — 36415 COLL VENOUS BLD VENIPUNCTURE: CPT

## 2023-09-26 PROCEDURE — 80048 BASIC METABOLIC PNL TOTAL CA: CPT

## 2023-09-26 PROCEDURE — 80307 DRUG TEST PRSMV CHEM ANLYZR: CPT

## 2023-09-26 PROCEDURE — 93005 ELECTROCARDIOGRAM TRACING: CPT

## 2023-09-26 PROCEDURE — 82962 GLUCOSE BLOOD TEST: CPT

## 2023-09-26 PROCEDURE — 99283 EMERGENCY DEPT VISIT LOW MDM: CPT | Mod: 25

## 2023-09-26 PROCEDURE — 90792 PSYCH DIAG EVAL W/MED SRVCS: CPT

## 2023-09-26 PROCEDURE — 93010 ELECTROCARDIOGRAM REPORT: CPT

## 2023-09-26 RX ORDER — FAMOTIDINE 10 MG/ML
20 INJECTION INTRAVENOUS ONCE
Refills: 0 | Status: COMPLETED | OUTPATIENT
Start: 2023-09-26 | End: 2023-09-26

## 2023-09-26 RX ADMIN — FAMOTIDINE 20 MILLIGRAM(S): 10 INJECTION INTRAVENOUS at 20:35

## 2023-09-26 RX ADMIN — Medication 30 MILLILITER(S): at 20:35

## 2023-09-26 NOTE — ED BEHAVIORAL HEALTH ASSESSMENT NOTE - DIFFERENTIAL
Adjustment Disorder with mixed anxiety and depressed mood  Substance Induced Mood Disorder  Antisocial Personality Disorder  Cocaine Use Disorder  Alcohol Use Disorder  Cannabis Use Disorder

## 2023-09-26 NOTE — ED BEHAVIORAL HEALTH ASSESSMENT NOTE - SAFETY PLAN ADDT'L DETAILS
Education provided regarding environmental safety / lethal means restriction/Provision of National Suicide Prevention Lifeline 9-261-199-TALK (1642)

## 2023-09-26 NOTE — ED ADULT TRIAGE NOTE - CHIEF COMPLAINT QUOTE
Patient states " has thoughts of committing suicide" no definite, denies any HI or hallucinations.  Patient states Dx w/ shizoaffective disorder, not taking any meds.  SI precautions, 1:1 sitter, in yellow gown, belongings secured, patient wanded by security.

## 2023-09-26 NOTE — ED BEHAVIORAL HEALTH ASSESSMENT NOTE - RISK ASSESSMENT
Chronic risk factors: single, male gender, ongoing chronic polysubstance abuse, highly likely Antisocial Personality Disorder, hx of intentional threatening behavior to have needs met; long hx of refusing to attend/complete outpatient substance abuse services; undomiciled, reports ongoing unemployment. Protective factors: young; healthy; no history of actual suicide attempts; access to health services. No acute risk factors identified - presenting at baseline as supported by extensive chart history

## 2023-09-26 NOTE — ED PROVIDER NOTE - NSFOLLOWUPINSTRUCTIONS_ED_ALL_ED_FT
Follow up with your primary medical doctor as soon as possible.    Return to the emergency department if your symptoms worsen or if you develop new symptoms.  If you have any problems with followup, please call the ED Referral Coordinator at [phone].

## 2023-09-26 NOTE — ED BEHAVIORAL HEALTH ASSESSMENT NOTE - NSCURPASTPSYDX_PSY_ALL_CORE
Alcohol/Substance Use disorders/Cluster B Personality disorder/traits Mood disorder/Alcohol/Substance Use disorders/Cluster B Personality disorder/traits

## 2023-09-26 NOTE — ED BEHAVIORAL HEALTH ASSESSMENT NOTE - DESCRIPTION
The patient was born in [place] and raised in [place] by his biological mother and biological father along with three brothers and one sister.  The patietn is currenlty single, unemployed (on SSD), domiciled alone in a private apartment in [facility] for the past 6 months.  The patient reports a history of  service in the [employer] from 2008 to 2012 but he denies seeing any combat.  The patient is Yazidi.  He completed a BA in Psychology at [employer] (2006) GERD, HSV, HTN, Diabetes mellitus Type 2 unremarkable Patient was calm and cooperative throughout his ED stay.  No PRNs required.

## 2023-09-26 NOTE — ED BEHAVIORAL HEALTH ASSESSMENT NOTE - SUMMARY
47 year old  male, single, unemployed (on SSD), domiciled alone in a private apartment in [facility] for the past 6 months with PMH GERD, Diabetes mellitus Type 2, Obesity, a well documented history of admissions to various medical facilities in what is suspected to be attempts to avoid homeless shelters with chronologically correlating ED visits verbalizing suicidal ideation and request for admission with long charted history of "throwing away" medications on discharge and not following up on outpatient referrals, history of multiple prior inpatient psychiatric admissions (most recently at [facility] 01/13/23 - 02/08/23l), + long term, continuous Polysubstance Abuse (Tobacco, Alcohol, Cocaine, Cannabis, Opioid), history of multiple [facility] rehab/detox (most recently at [facility] (04/20/23 - 04/25/23), history of multiple arrests for drug possession; extensive inpatient psychiatric documentation outlining Antisocial Personality Traits and intentional acting out behaviors including threats when he was close to discharge (ie. ..."in his room, he became agitated and violent at night and needed IM prns and seclusion.  He flipped a table and threatened to punch his roommate.  He then urinated in the Seclusion room.  He reported that he would shoot himself if he was discharged"), with no history of suicide attempts/NSSIB, not currently engaged in outpatient psychiatric treatment (documented history of patient not following up despite referrals prior to discharge) BIB self to [facility] ED reporting vague SI.    evaluation, the patient is calm, superficially cooperative, with euthymic affect, demonstrating good behavioral control and linear thought processes.  The patient reports SI for 2-3 days with vague plan and no intent.  The patient is goal-directed and focused on making his needs known and met.  The patient seems concerned primarily with admission for shelter and uses statements about SI as a means to that end. The patient additionally reports an interval history that is selectively vague, volitionally provocative and inconsistent with prior documentation. On MSE patient?was found to lack any genuine distress or objectively observable findings of decompensated depressive, manic, psychotic, anxiety/panic, personality disorder, intoxication, or withdrawal.  The patient's affect is full and reactive, incongruent with the reported mood.  There is no evidence to support a treatable psychiatric condition beyond substance use disorder at this time.  The patient has numerous chronic risk factor for harm to self or others (male gender, active substance use, multiple medical comorbidities, history of treatment non-compliance, antisocial traits, poor coping skills and acting out behaviors), but the patient is organized, help-seeking, forward-thinking with good social support, no history of SA/NSSIB, and no current manic or psychotic symptoms.  The patient's chronic risk factors are unlikely to be modified by an inpatient psychiatric admission.  Patient is not at increased risk from baseline at this time.  Given that the patient does not present with imminent danger to self/others, he does not meet criteria for psychiatric hospitalization or further observation. There is no clear evidence that there would be significant benefit or modification of risk factors from treatment in an inpatient setting.  Patient is stable for discharge. Any impulsive and/or violent actions taken after discharge would be volitional be better characterized as provocative acting out, goal-directed &?criminal in nature as opposed to the result of acute psychiatric illness. Overall patient's presentation at this encounter is best characterized as secondary gain seeking in the absence?of any genuine psychiatric?disturbance and is consistent with Adjustment Disorder with mixed anxiety and depressed mood versus Substance Induced Mood Disorder.  The patient is psychiatrically cleared for discharge to self.  The patient would benefit from outpatient psychiatric follow-up and [facility] treatment. 47 year old  male, single, unemployed (on SSD), domiciled alone in a private apartment in [facility] for the past 6 months with PMH GERD, Diabetes mellitus Type 2, Obesity, a well documented history of admissions to various medical facilities in what is suspected to be attempts to avoid homeless shelters with chronologically correlating ED visits verbalizing suicidal ideation and request for admission with long charted history of "throwing away" medications on discharge and not following up on outpatient referrals, history of multiple prior inpatient psychiatric admissions (most recently at [facility] 01/13/23 - 02/08/23l), + long term, continuous Polysubstance Abuse (Tobacco, Alcohol, Cocaine, Cannabis, Opioid), history of multiple [facility] rehab/detox (most recently at [facility] (04/20/23 - 04/25/23), history of multiple arrests for drug possession; extensive inpatient psychiatric documentation outlining Antisocial Personality Traits and intentional acting out behaviors including threats when he was close to discharge (ie. ..."in his room, he became agitated and violent at night and needed IM prns and seclusion.  He flipped a table and threatened to punch his roommate.  He then urinated in the Seclusion room.  He reported that he would shoot himself if he was discharged"), with no history of suicide attempts/NSSIB, not currently engaged in outpatient psychiatric treatment (documented history of patient not following up despite referrals prior to discharge) BIB self to [facility] ED reporting vague SI.    On evaluation, the patient is calm, superficially cooperative, with euthymic affect, demonstrating good behavioral control and linear thought processes.  The patient reports chronic SI for the past year without intent or plan as well as vague depressive symptoms.  The patient is goal-directed and focused on making his needs known and met.  The patient seems concerned primarily with admission for shelter and uses statements about SI as a means to that end. The patient additionally reports an interval history that is selectively vague, volitionally provocative and inconsistent with prior documentation. On MSE patient was found to lack any genuine distress or objectively observable findings of decompensated depressive, manic, psychotic, anxiety/panic, personality disorder, intoxication, or withdrawal.  The patient's affect is full and reactive, incongruent with the reported mood.  There is no evidence to support a treatable psychiatric condition beyond substance use disorder at this time.  The patient has numerous chronic risk factor for harm to self or others (male gender, active substance use, multiple medical comorbidities, history of treatment non-compliance, antisocial traits, poor coping skills and acting out behaviors), but the patient is organized, help-seeking, forward-thinking with good social support, no history of SA/NSSIB, and no current manic or psychotic symptoms.  The patient's chronic risk factors are unlikely to be modified by an inpatient psychiatric admission.  Patient is not at increased risk from baseline at this time.  Given that the patient does not present with imminent danger to self/others, he does not meet criteria for psychiatric hospitalization or further observation. There is no clear evidence that there would be significant benefit or modification of risk factors from treatment in an inpatient setting.  Patient is stable for discharge. Any impulsive and/or violent actions taken after discharge would be volitional be better characterized as provocative acting out, goal-directed &?criminal in nature as opposed to the result of acute psychiatric illness. Overall patient's presentation at this encounter is best characterized as secondary gain seeking in the absence of any genuine psychiatric disturbance and is consistent with Adjustment Disorder with mixed anxiety and depressed mood versus Substance Induced Mood Disorder.  The patient is psychiatrically cleared for discharge to self.  The patient would benefit from outpatient psychiatric follow-up and [facility] treatment.

## 2023-09-26 NOTE — ED BEHAVIORAL HEALTH ASSESSMENT NOTE - DETAILS
see HPI; long hx of making suicidal threats in medical settings with high index of suspicion in order to gain admission all notes from inpatient Psychiatrist and Treatment Team reviewed The patient was offered an opportunity to complete a Safety Plan but refused. YVROSE from [facility] 12/3/21 Prior documentation indicates that the patient has reported that his brother  of a drug overdose although the patient currently denies any family psychiatric history or SAs. as per records, hx of acting out aggressive behavior on inpatient units when he is close to discharge or does not get what he asked for Disposition discussed with ED attending. history of  service in [employer] from 2008 to 2012 but he denies seeing any combat. See HPI

## 2023-09-26 NOTE — ED BEHAVIORAL HEALTH ASSESSMENT NOTE - PAST PSYCHOTROPIC MEDICATION
quetiapine, risperdal, lithium, Topomax, Abilify, Wellbutrin, Geodon, Lexapro, trazodone , Valium, Naltrexone, Gabapentin, cogentin, melatonin, fluoxetine

## 2023-09-26 NOTE — ED PROVIDER NOTE - CLINICAL SUMMARY MEDICAL DECISION MAKING FREE TEXT BOX
Suicidal ideation with loose plan to harm self. Psychiatry consulted. No signs/symptoms of infection. C/o gerd, will treat w pepcid and maalox. Polysubstance abuse, last drink yesterday, no s/s of withdrawal. Screening labs for medical cause of symptoms sent.  Psych consulted. Suicidal ideation with loose plan to harm self. Psychiatry consulted. No signs/symptoms of infection. C/o gerd, will treat w pepcid and maalox. Polysubstance abuse, last drink yesterday, no s/s of withdrawal. Screening labs for medical cause of symptoms sent.  Psych consulted.    Cleared by psych. Rehab contacted, will pick pt up at 5 am from [facility].

## 2023-09-26 NOTE — ED BEHAVIORAL HEALTH ASSESSMENT NOTE - HPI (INCLUDE ILLNESS QUALITY, SEVERITY, DURATION, TIMING, CONTEXT, MODIFYING FACTORS, ASSOCIATED SIGNS AND SYMPTOMS)
Note:  PATIENT HAS ANOTHER EDI MRN # [id number]    47 year old  male, single, unemployed (on SSD), domiciled alone in a private apartment in [facility] for the past 6 months with PMH GERD, Diabetes mellitus Type 2, Obesity, a well documented history of admissions to various medical facilities in what is suspected to be attempts to avoid homeless shelters with chronologically correlating ED visits verbalizing suicidal ideation and request for admission with long charted history of "throwing away" medications on discharge and not following up on outpatient referrals, history of multiple prior inpatient psychiatric admissions (most recently at [facility] 01/13/23 - 02/08/23l), + long term, continuous Polysubstance Abuse (Tobacco, Alcohol, Cocaine, Cannabis, Opioid), history of multiple [facility] rehab/detox (most recently at [facility] (04/20/23 - 04/25/23), history of multiple arrests for drug possession; extensive inpatient psychiatric documentation outlining Antisocial Personality Traits and intentional acting out behaviors including threats when he was close to discharge (ie. ..."in his room, he became agitated and violent at night and needed IM prns and seclusion.  He flipped a table and threatened to punch his roommate.  He then urinated in the Seclusion room.  He reported that he would shoot himself if he was discharged"), with no history of suicide attempts/NSSIB, not currently engaged in outpatient psychiatric treatment (documented history of patient not following up despite referrals prior to discharge) BIB self to [facility] ED reporting vague SI.    On interview, the patient is calm, superficially cooperative, demonstrating good behavioral control and linear thought processes.      EXAM: calm, cooperative initially - Starts off with "I want to jump off a bridge. Admit me." Patient expectantly looking into camera as if awaiting an immediate offer of admission  to inpatient psychiatry. Initially saying h has never seen a psychiatrist before and then says "I guess" when asked if he was the same Mr Calhoun who just got discharged from  [facility] 3 weeks ago. Patient admits that he never went to the [facility] outpatient program he was referred to and admitted that he "never took" his discharge medications as "they didn't work." Patient then states that his issue is that he has back pain and needs Neurontin and pain medications and thus his PCP Dr Willams gives hum Neurontin, Prozac and Seroquel which he prefers and feels like "this combo is working better." (Of note; Dr Nunez is also prescribing benzos for Patient and has for > 1 year). Pt gets visibly annoyed at having to answer questions and the interview progresses. Pt's history of inpatient admissions, minimal cooperation with theraputic milieu and consistent "throwing away" of his discharge medications and refusal to go to recommended and scheduled outpatient program/services was reviewed with him. Patient at this point scowling at camera and said "I wish there was a real person I could talk to." Patient directly asked how readmission to inpatient psychiatry would be different at this time / how it would help him this time around , as well as what he was looking for as he reported that Dr Willams's medication combination is working bettr than what Aidan montoya him- Patient said he did not know and said "maybe I will feel differently." Patient not able to elaborate what his treatment goals would be and what he would do differently. Patient said he is ok to "just waiting 3 days in the Emergency Room" as well when informed of the wait for inpt beds due to COVID outbreak. Pt thus offered to consider going to an inpatient substance abuse rehab given his history which Pt outright refused and in a threatening tone of voice said "If I do, I will kill myself" and started into the camera. Also said that the interview is an "interrogation." Patient then was asked to elaborate on what his symptoms as to mood, anxiety - at this point in time, the camera seemed to have frozen / turned off. Writer re-accessed the Telepsych cart immediately and Patient was no longer in the room. It is highly likely Pt walked out of the room.    COLLATERAL: none. Patient said he lives alone and did no have any friends or family whose name and number he could provide  ISTOP reference [id number]   filled Klonopin 1mg po TID #90 for 30 days on 12/4/21 RX by Dr Bethel Willams   filled Valium 10mg PO TID #90 days for 30 days on 8/20/21 Note:  PATIENT HAS ANOTHER EDI MRN # [id number]    47 year old  male, single, employed, domiciled alone in a private apartment in [facility] with PMH GERD, Diabetes mellitus Type 2, history of Obesity, a well documented history of admissions to various medical facilities in what is suspected to be attempts to avoid homeless shelters with chronologically correlating ED visits verbalizing suicidal ideation and request for admission with long charted history of "throwing away" medications on discharge and not following up on outpatient referrals, history of multiple prior inpatient psychiatric admissions (most recently at [facility] 01/13/23 - 02/08/23l), + long term, continuous Polysubstance Abuse (Tobacco, Alcohol, Cocaine, Cannabis, Opioid), history of multiple [facility] rehab/detox (most recently at [facility] (04/20/23 - 04/25/23), history of multiple arrests for drug possession; extensive inpatient psychiatric documentation outlining Antisocial Personality Traits and intentional acting out behaviors including threats when he was close to discharge (ie. ..."in his room, he became agitated and violent at night and needed IM prns and seclusion.  He flipped a table and threatened to punch his roommate.  He then urinated in the Seclusion room.  He reported that he would shoot himself if he was discharged"), with no history of suicide attempts/NSSIB, not currently engaged in outpatient psychiatric treatment (documented history of patient not following up despite referrals prior to discharge) BIB self to [facility] ED reporting vague SI.    On interview, the patient is calm, superficially cooperative, well-related with euthymic affect and demonstrating good behavioral control and linear thought processes.  The patient states that he has been "having thoughts of hurting [him]self, suicidal thoughts and depression" for approximately one year.  The patient is asked to elaborate but only reports low mood and declines to elaborate further.  The patient attributes this to a break-up approximately one year ago and ongoing substance use (alcohol, cocaine, opioids and cannabis).  He reports that he last use all four yesterday and states that, although he has gone on binges for 4-5 days at a time, he has decreased his use to 2-3 times a week.  He states that he was last in [facility] detox/rehab in August 2023 and he states that he is interested in [facility] detox/rehab again.  The patient states that he is currently seeing an internist, Dr. Bethel Blevins MD, who prescribes him Zoloft 150 mg PO daily, Seroquel 200 mg PO qHs, and Ambien 10 mg PO qHs for insomnia.  He states that he last saw Dr. Blevins approximately one month ago but he does not currently have a follow-up appointment scheduled.  When asked why he decided to present to the ED today given the chronic SI, he states that he went to visit a friend today but felt suicidal and he was told to always present to the ED if he was suicidal.  He states that, since he was close to [facility], he decided to drop in and seek help.  The patient is happy to report that, since the last time he was evaluated by this writer, he has obtained three part-time jobs including construction, waiting and subbing as a teacher.  The patient currently denies SI/HI, intent and plan but states that, if he is discharged, he will have worsening suicidal ideation and he is "afraid of being alone."  The patient retracts this statement once pick-up is arranged for 5:00 am on 09/27/23 for transport to  detox/rehab.  No paranoia or delusions are reported or elicited on interview.  All questions and concerns addressed.

## 2023-09-26 NOTE — ED BEHAVIORAL HEALTH ASSESSMENT NOTE - MEDICATIONS (PRESCRIPTIONS, DIRECTIONS)
as prescribed continue home medications Zoloft 150 mg PO daily, Seroquel 200 mg PO qHs, and Ambien 10 mg PO qHs for insomnia.

## 2023-09-26 NOTE — ED BEHAVIORAL HEALTH ASSESSMENT NOTE - REFERRAL / APPOINTMENT DETAILS
[facility] ([phone]) or [facility] to present on 04/08/22 at 9 am for initial intake. Scheduled pick-up at 5 am for transport to Inpatient [facility] detox/rehab with Ash Jacob.

## 2023-09-26 NOTE — ED BEHAVIORAL HEALTH ASSESSMENT NOTE - OTHER PAST PSYCHIATRIC HISTORY (INCLUDE DETAILS REGARDING ONSET, COURSE OF ILLNESS, INPATIENT/OUTPATIENT TREATMENT)
PSYCKES:  MEDICAID ID: [id number]  TREATMENT FOR SI: SIx10 (first on 01/10/19) most recently on 05/08/22 at [facility]  QUALITY FLAGS: High Mental Health Need, High Utilization Inpt/ER, Readmission Post-Discharge from any Hospital ( to ).  BEHAVIORAL HEALTH DIAGNOSES: Alcohol related disorders Cocaine related disorders Sedative, hypnotic, or anxiolytic related disorders Unspecified/Other Anxiety Disorder Major Depressive Disorder Other psychoactive substance related disorders Generalized Anxiety Disorder Opioid related disorders Cannabis related disorders Unspecified/Other Bipolar Obsessive-Compulsive Disorder Schizoaffective Disorder Unspecified/Other Depressive Disorder Unspecified/Other Personality Disorder Adjustment Disorder Panic Disorder Persistent Depressive Disorder Substance-Induced Depressive Disorder Tobacco related disorder  PSYCHIATRIC MEDICATIONS: Nicotine Polacrilex 2 mg 12.31/day (07/27/23), Nicotine 7mg/24h4 1/day (05/01/23)  OUTPATIENT: [email or web address] (08/25/21) for Alcohol Abuse Uncomplicated, Bethel Blevins (08/29/17-01/24/20; 02/16/21-06/22/21) for MDD single episode unspecified, Marla Falcon (02/23/21) for Alcohol Dependence Uncomplicated, [facility] (05/18/19) for Adjustment Disorder with Mixed Anxiety and Depressed Mood, Patrick Knapp MD (10/30/18-11/14/18) for Other Psychoactive Substance Dependence in Remission, [facility] (08/24/18-10/01/18) for Alcohol Dependence Uncomplicated, [employer] [facility] (04/03/18) for Alcohol Dependence Uncomplicated, [facility] (12/18/17-01/12/18) for Alcohol Abuse Uncomplicated  INPATIENT: MH x6 most recently at [facility] (01/13/23 - 02/08/23) for schizoaffective disorder depressive type, [facility] (01/11/23 - 01/13/23) for schizoaffective disorder depressive type, [facility] (04/11/22 - 04/19/22) for MDD single episode unspecified, [facility] (03/16/22 - 04/04/22); [facility] x24 most recently at [facility] (04/20/23 - 04/25/23) for Alcohol Dependence Uncomplicated, [facility] (03/23/23 - 03/28/23) for Alcohol Dependence with Withdrawal Uncomplicated  ED: MH x16 most recently at [facility] (04/01/23) for MDD Single Episode Unspecified, [facility] x9 most recently at [facility] (02/14/23) Alcohol Abuse with Intoxication Uncomplicated

## 2023-09-26 NOTE — ED PROVIDER NOTE - PATIENT PORTAL LINK FT
You can access the FollowMyHealth Patient Portal offered by [facility] by registering at the following website: [email or web address]. By joining [employer]’s FollowMyHealth portal, you will also be able to view your health information using other applications (apps) compatible with our system.

## 2023-09-26 NOTE — ED PROVIDER NOTE - OBJECTIVE STATEMENT
46 yo M w PMH of DM, HTN, GERD, PPH of schizoaffective disorder and depression, on sertraline, seroquel, and ambien, p/w suicidal ideation and polysubstance abuse. Pt states he has had SI for past year w loose plan to use firearm he owns or jump in front of train. He has no prior suicide attempts. He has been admitted multiple times for psychiatric complaints. He states that over the past year, he has been using alcohol, heroin, and cocaine with more frequency. Last drink was yesterday, no history of withdrawal. Patient has complaint of mild GERD in ED, identical to his typical GERD.

## 2023-09-26 NOTE — ED PROVIDER NOTE - NS ED ROS FT
CONSTITUTIONAL: No fever, no chills, no fatigue  EYES: No eye redness, no visual changes  ENT: No ear pain, no sore throat  CARDIOVASCULAR: No chest pain, no palpitations  RESPIRATORY: No cough, no SOB  GI: No abdominal pain, no nausea, no vomiting, no constipation, no diarrhea  GENITOURINARY: No dysuria, no frequency, no hematuria  MUSCULOSKELETAL: No back pain, no joint pain, no myalgias  SKIN: No rash, no peripheral edema  NEURO: No headache, no confusion  PSYCH: + SI, no HI, no AVH.    ALL OTHER SYSTEMS NEGATIVE.

## 2023-09-26 NOTE — ED ADULT NURSE NOTE - OBJECTIVE STATEMENT
46 y/o M c/o suicidal thoughts, mhx schizoaffective disorder. Pt endorses not taking his medication (Sertaline 150mg, Ambien 10mg, Seroquel 200mg) because he lost them. Pt endorses SI for past year, alcohol, heroine, cocaine use. 46 y/o M c/o suicidal thoughts, mhx schizoaffective disorder. Pt endorses not taking his medication (Sertaline 150mg, Ambien 10mg, Seroquel 200mg) because he lost them. Pt endorses SI for past year, alcohol, heroine, cocaine use. Patient placed on constant observation on arrival to ED floor. Security at bedside. Pt wanded. Pt changed into gown. All belongings secured with security. ED tech at bedside. All ligature risks removed. All safety precautions maintained.

## 2023-09-29 DIAGNOSIS — R45.851 SUICIDAL IDEATIONS: ICD-10-CM

## 2023-09-29 DIAGNOSIS — F25.9 SCHIZOAFFECTIVE DISORDER, UNSPECIFIED: ICD-10-CM

## 2023-09-29 DIAGNOSIS — K21.9 GASTRO-ESOPHAGEAL REFLUX DISEASE WITHOUT ESOPHAGITIS: ICD-10-CM

## 2023-09-29 DIAGNOSIS — F19.94 OTHER PSYCHOACTIVE SUBSTANCE USE, UNSPECIFIED WITH PSYCHOACTIVE SUBSTANCE-INDUCED MOOD DISORDER: ICD-10-CM

## 2023-09-29 DIAGNOSIS — E11.9 TYPE 2 DIABETES MELLITUS WITHOUT COMPLICATIONS: ICD-10-CM

## 2023-09-29 DIAGNOSIS — F10.90 ALCOHOL USE, UNSPECIFIED, UNCOMPLICATED: ICD-10-CM

## 2023-09-29 DIAGNOSIS — F32.A DEPRESSION, UNSPECIFIED: ICD-10-CM

## 2023-09-29 DIAGNOSIS — F10.10 ALCOHOL ABUSE, UNCOMPLICATED: ICD-10-CM

## 2023-09-29 DIAGNOSIS — F11.10 OPIOID ABUSE, UNCOMPLICATED: ICD-10-CM

## 2023-09-29 DIAGNOSIS — F14.19 COCAINE ABUSE WITH UNSPECIFIED COCAINE-INDUCED DISORDER: ICD-10-CM

## 2023-09-29 DIAGNOSIS — F60.2 ANTISOCIAL PERSONALITY DISORDER: ICD-10-CM

## 2023-09-29 DIAGNOSIS — F43.25 ADJUSTMENT DISORDER WITH MIXED DISTURBANCE OF EMOTIONS AND CONDUCT: ICD-10-CM

## 2023-09-29 DIAGNOSIS — Z91.010 ALLERGY TO PEANUTS: ICD-10-CM

## 2023-09-29 DIAGNOSIS — F12.90 CANNABIS USE, UNSPECIFIED, UNCOMPLICATED: ICD-10-CM

## 2023-10-02 ENCOUNTER — HOSPITAL ENCOUNTER (INPATIENT)
Dept: HOSPITAL 74 - YASAS | Age: 48
LOS: 1 days | Discharge: LEFT BEFORE DISCHARGE | DRG: 894 | End: 2023-10-03
Attending: PSYCHIATRY & NEUROLOGY | Admitting: ALLERGY & IMMUNOLOGY
Payer: COMMERCIAL

## 2023-10-02 DIAGNOSIS — M19.042: ICD-10-CM

## 2023-10-02 DIAGNOSIS — F19.24: ICD-10-CM

## 2023-10-02 DIAGNOSIS — F10.20: Primary | ICD-10-CM

## 2023-10-02 DIAGNOSIS — F14.20: ICD-10-CM

## 2023-10-02 DIAGNOSIS — M19.041: ICD-10-CM

## 2023-10-02 DIAGNOSIS — I10: ICD-10-CM

## 2023-10-02 DIAGNOSIS — F41.9: ICD-10-CM

## 2023-10-02 DIAGNOSIS — G89.29: ICD-10-CM

## 2023-10-02 DIAGNOSIS — K21.9: ICD-10-CM

## 2023-10-02 DIAGNOSIS — G62.9: ICD-10-CM

## 2023-10-02 DIAGNOSIS — M54.50: ICD-10-CM

## 2023-10-02 DIAGNOSIS — F17.210: ICD-10-CM

## 2023-10-02 PROCEDURE — HZ42ZZZ GROUP COUNSELING FOR SUBSTANCE ABUSE TREATMENT, COGNITIVE-BEHAVIORAL: ICD-10-PCS | Performed by: PSYCHIATRY & NEUROLOGY

## 2023-10-03 VITALS
RESPIRATION RATE: 17 BRPM | TEMPERATURE: 96.6 F | DIASTOLIC BLOOD PRESSURE: 70 MMHG | SYSTOLIC BLOOD PRESSURE: 108 MMHG | HEART RATE: 65 BPM

## 2023-11-24 ENCOUNTER — EMERGENCY (EMERGENCY)
Facility: HOSPITAL | Age: 48
LOS: 1 days | Discharge: HOME | End: 2023-11-24
Attending: STUDENT IN AN ORGANIZED HEALTH CARE EDUCATION/TRAINING PROGRAM | Admitting: STUDENT IN AN ORGANIZED HEALTH CARE EDUCATION/TRAINING PROGRAM
Payer: MEDICARE

## 2023-11-24 VITALS
RESPIRATION RATE: 17 BRPM | HEART RATE: 89 BPM | WEIGHT: 229.94 LBS | OXYGEN SATURATION: 100 % | HEIGHT: 71 IN | DIASTOLIC BLOOD PRESSURE: 97 MMHG | SYSTOLIC BLOOD PRESSURE: 165 MMHG | TEMPERATURE: 98 F

## 2023-11-24 VITALS
SYSTOLIC BLOOD PRESSURE: 146 MMHG | OXYGEN SATURATION: 96 % | HEART RATE: 92 BPM | DIASTOLIC BLOOD PRESSURE: 81 MMHG | TEMPERATURE: 98 F | RESPIRATION RATE: 18 BRPM

## 2023-11-24 DIAGNOSIS — F19.10 OTHER PSYCHOACTIVE SUBSTANCE ABUSE, UNCOMPLICATED: ICD-10-CM

## 2023-11-24 LAB
ANION GAP SERPL CALC-SCNC: 12 MMOL/L — SIGNIFICANT CHANGE UP (ref 5–17)
ANION GAP SERPL CALC-SCNC: 12 MMOL/L — SIGNIFICANT CHANGE UP (ref 5–17)
BASOPHILS # BLD AUTO: 0.03 K/UL — SIGNIFICANT CHANGE UP (ref 0–0.2)
BASOPHILS # BLD AUTO: 0.03 K/UL — SIGNIFICANT CHANGE UP (ref 0–0.2)
BASOPHILS NFR BLD AUTO: 0.4 % — SIGNIFICANT CHANGE UP (ref 0–2)
BASOPHILS NFR BLD AUTO: 0.4 % — SIGNIFICANT CHANGE UP (ref 0–2)
BUN SERPL-MCNC: 11 MG/DL — SIGNIFICANT CHANGE UP (ref 7–23)
BUN SERPL-MCNC: 11 MG/DL — SIGNIFICANT CHANGE UP (ref 7–23)
CALCIUM SERPL-MCNC: 9.5 MG/DL — SIGNIFICANT CHANGE UP (ref 8.4–10.5)
CALCIUM SERPL-MCNC: 9.5 MG/DL — SIGNIFICANT CHANGE UP (ref 8.4–10.5)
CHLORIDE SERPL-SCNC: 108 MMOL/L — SIGNIFICANT CHANGE UP (ref 96–108)
CHLORIDE SERPL-SCNC: 108 MMOL/L — SIGNIFICANT CHANGE UP (ref 96–108)
CO2 SERPL-SCNC: 24 MMOL/L — SIGNIFICANT CHANGE UP (ref 22–31)
CO2 SERPL-SCNC: 24 MMOL/L — SIGNIFICANT CHANGE UP (ref 22–31)
CREAT SERPL-MCNC: 0.92 MG/DL — SIGNIFICANT CHANGE UP (ref 0.5–1.3)
CREAT SERPL-MCNC: 0.92 MG/DL — SIGNIFICANT CHANGE UP (ref 0.5–1.3)
EGFR: 104 ML/MIN/1.73M2 — SIGNIFICANT CHANGE UP
EGFR: 104 ML/MIN/1.73M2 — SIGNIFICANT CHANGE UP
EOSINOPHIL # BLD AUTO: 0.16 K/UL — SIGNIFICANT CHANGE UP (ref 0–0.5)
EOSINOPHIL # BLD AUTO: 0.16 K/UL — SIGNIFICANT CHANGE UP (ref 0–0.5)
EOSINOPHIL NFR BLD AUTO: 2 % — SIGNIFICANT CHANGE UP (ref 0–6)
EOSINOPHIL NFR BLD AUTO: 2 % — SIGNIFICANT CHANGE UP (ref 0–6)
GLUCOSE SERPL-MCNC: 98 MG/DL — SIGNIFICANT CHANGE UP (ref 70–99)
GLUCOSE SERPL-MCNC: 98 MG/DL — SIGNIFICANT CHANGE UP (ref 70–99)
HCT VFR BLD CALC: 37.9 % — LOW (ref 39–50)
HCT VFR BLD CALC: 37.9 % — LOW (ref 39–50)
HGB BLD-MCNC: 12.7 G/DL — LOW (ref 13–17)
HGB BLD-MCNC: 12.7 G/DL — LOW (ref 13–17)
IMM GRANULOCYTES NFR BLD AUTO: 0.2 % — SIGNIFICANT CHANGE UP (ref 0–0.9)
IMM GRANULOCYTES NFR BLD AUTO: 0.2 % — SIGNIFICANT CHANGE UP (ref 0–0.9)
LYMPHOCYTES # BLD AUTO: 2.55 K/UL — SIGNIFICANT CHANGE UP (ref 1–3.3)
LYMPHOCYTES # BLD AUTO: 2.55 K/UL — SIGNIFICANT CHANGE UP (ref 1–3.3)
LYMPHOCYTES # BLD AUTO: 31.1 % — SIGNIFICANT CHANGE UP (ref 13–44)
LYMPHOCYTES # BLD AUTO: 31.1 % — SIGNIFICANT CHANGE UP (ref 13–44)
MCHC RBC-ENTMCNC: 29.7 PG — SIGNIFICANT CHANGE UP (ref 27–34)
MCHC RBC-ENTMCNC: 29.7 PG — SIGNIFICANT CHANGE UP (ref 27–34)
MCHC RBC-ENTMCNC: 33.5 GM/DL — SIGNIFICANT CHANGE UP (ref 32–36)
MCHC RBC-ENTMCNC: 33.5 GM/DL — SIGNIFICANT CHANGE UP (ref 32–36)
MCV RBC AUTO: 88.8 FL — SIGNIFICANT CHANGE UP (ref 80–100)
MCV RBC AUTO: 88.8 FL — SIGNIFICANT CHANGE UP (ref 80–100)
MONOCYTES # BLD AUTO: 0.62 K/UL — SIGNIFICANT CHANGE UP (ref 0–0.9)
MONOCYTES # BLD AUTO: 0.62 K/UL — SIGNIFICANT CHANGE UP (ref 0–0.9)
MONOCYTES NFR BLD AUTO: 7.6 % — SIGNIFICANT CHANGE UP (ref 2–14)
MONOCYTES NFR BLD AUTO: 7.6 % — SIGNIFICANT CHANGE UP (ref 2–14)
NEUTROPHILS # BLD AUTO: 4.81 K/UL — SIGNIFICANT CHANGE UP (ref 1.8–7.4)
NEUTROPHILS # BLD AUTO: 4.81 K/UL — SIGNIFICANT CHANGE UP (ref 1.8–7.4)
NEUTROPHILS NFR BLD AUTO: 58.7 % — SIGNIFICANT CHANGE UP (ref 43–77)
NEUTROPHILS NFR BLD AUTO: 58.7 % — SIGNIFICANT CHANGE UP (ref 43–77)
NRBC # BLD: 0 /100 WBCS — SIGNIFICANT CHANGE UP (ref 0–0)
NRBC # BLD: 0 /100 WBCS — SIGNIFICANT CHANGE UP (ref 0–0)
PLATELET # BLD AUTO: 210 K/UL — SIGNIFICANT CHANGE UP (ref 150–400)
PLATELET # BLD AUTO: 210 K/UL — SIGNIFICANT CHANGE UP (ref 150–400)
POTASSIUM SERPL-MCNC: 3.8 MMOL/L — SIGNIFICANT CHANGE UP (ref 3.5–5.3)
POTASSIUM SERPL-MCNC: 3.8 MMOL/L — SIGNIFICANT CHANGE UP (ref 3.5–5.3)
POTASSIUM SERPL-SCNC: 3.8 MMOL/L — SIGNIFICANT CHANGE UP (ref 3.5–5.3)
POTASSIUM SERPL-SCNC: 3.8 MMOL/L — SIGNIFICANT CHANGE UP (ref 3.5–5.3)
RBC # BLD: 4.27 M/UL — SIGNIFICANT CHANGE UP (ref 4.2–5.8)
RBC # BLD: 4.27 M/UL — SIGNIFICANT CHANGE UP (ref 4.2–5.8)
RBC # FLD: 12.9 % — SIGNIFICANT CHANGE UP (ref 10.3–14.5)
RBC # FLD: 12.9 % — SIGNIFICANT CHANGE UP (ref 10.3–14.5)
SODIUM SERPL-SCNC: 144 MMOL/L — SIGNIFICANT CHANGE UP (ref 135–145)
SODIUM SERPL-SCNC: 144 MMOL/L — SIGNIFICANT CHANGE UP (ref 135–145)
TROPONIN T, HIGH SENSITIVITY RESULT: 7 NG/L — SIGNIFICANT CHANGE UP (ref 0–51)
TROPONIN T, HIGH SENSITIVITY RESULT: 7 NG/L — SIGNIFICANT CHANGE UP (ref 0–51)
TROPONIN T, HIGH SENSITIVITY RESULT: 8 NG/L — SIGNIFICANT CHANGE UP (ref 0–51)
TROPONIN T, HIGH SENSITIVITY RESULT: 8 NG/L — SIGNIFICANT CHANGE UP (ref 0–51)
WBC # BLD: 8.19 K/UL — SIGNIFICANT CHANGE UP (ref 3.8–10.5)
WBC # BLD: 8.19 K/UL — SIGNIFICANT CHANGE UP (ref 3.8–10.5)
WBC # FLD AUTO: 8.19 K/UL — SIGNIFICANT CHANGE UP (ref 3.8–10.5)
WBC # FLD AUTO: 8.19 K/UL — SIGNIFICANT CHANGE UP (ref 3.8–10.5)

## 2023-11-24 PROCEDURE — 84484 ASSAY OF TROPONIN QUANT: CPT

## 2023-11-24 PROCEDURE — 93010 ELECTROCARDIOGRAM REPORT: CPT

## 2023-11-24 PROCEDURE — 85025 COMPLETE CBC W/AUTO DIFF WBC: CPT

## 2023-11-24 PROCEDURE — 36415 COLL VENOUS BLD VENIPUNCTURE: CPT

## 2023-11-24 PROCEDURE — 99285 EMERGENCY DEPT VISIT HI MDM: CPT

## 2023-11-24 PROCEDURE — 80048 BASIC METABOLIC PNL TOTAL CA: CPT

## 2023-11-24 PROCEDURE — 71045 X-RAY EXAM CHEST 1 VIEW: CPT | Mod: 26

## 2023-11-24 PROCEDURE — 99285 EMERGENCY DEPT VISIT HI MDM: CPT | Mod: 25

## 2023-11-24 PROCEDURE — 71045 X-RAY EXAM CHEST 1 VIEW: CPT

## 2023-11-24 PROCEDURE — 93005 ELECTROCARDIOGRAM TRACING: CPT

## 2023-11-24 NOTE — ED PROVIDER NOTE - PHYSICAL EXAMINATION
general: Well appearing, in no acute distress  HEENT: Normocephalic, atraumatic, extraocular movements intact  CV: Regular rate  Pulm: No respiratory distress, no tachypnea, ctab, no wrr  Abd: Flat, no gross distension, soft, nontender, no r/g  Ext: warm and well perfused, 2+ pulses, wwp  Skin: No gross rashes or lesions  Neuro: Alert and oriented, moving all extremities

## 2023-11-24 NOTE — ED PROVIDER NOTE - CLINICAL SUMMARY MEDICAL DECISION MAKING FREE TEXT BOX
49 yo with cp, also requesting SW for rehab. SW aware. Crack cocaine 6 hrs PTA, ekg nonischemic. will check labs including trop, CXR, reassess.

## 2023-11-24 NOTE — ED PROVIDER NOTE - OBJECTIVE STATEMENT
48 year old male with ho PSA (crack cocaine, heroin) presenting requesting SW. Pt states he did crack cocaine, stated it may have been laced with fentanyl, but also does heroin, states he injects it. States he last did crack cocaine 6 hrs PTA. Self administered narcan yesterday. Pt has had 24 hrs of midsternal, nonexertional and nonpleuritic cp without fever, chills, cough, lightheadedness, dizziness. Pt denies cp currently. Also states he wants rehab. Requesting to speak to [facility] ROS as above.

## 2023-11-24 NOTE — ED ADULT TRIAGE NOTE - CHIEF COMPLAINT QUOTE
Pt c/o overdose on crack yesterday, "I think it was cut with fentanyl, I gave myself narcan". Pt c/o nausea, vomiting. Pt reports crack, marijuana use 6 hours ago. Denies cp, sob.

## 2023-11-24 NOTE — ED ADULT NURSE NOTE - OBJECTIVE STATEMENT
Pt A&Ox3 and able to speak in complete sentences. pt ambulatory with steady gait. pt endorsed using fent, and giving self narcan. pt endorsing n and v. pt endorsed chest discomfort. pt denies sob, lightheadedness, dizziness, fevers, chills. Her/She

## 2023-11-24 NOTE — ED PROVIDER NOTE - NSFOLLOWUPINSTRUCTIONS_ED_ALL_ED_FT
Please return for worsening symptoms, pain. Please stop using illicit substances. You were given outpatient resources for rehab.     Happy holidays!

## 2023-11-24 NOTE — ED ADULT NURSE NOTE - NSFALLUNIVINTERV_ED_ALL_ED
Bed/Stretcher in lowest position, wheels locked, appropriate side rails in place/Call bell, personal items and telephone in reach/Instruct patient to call for assistance before getting out of bed/chair/stretcher/Non-slip footwear applied when patient is off stretcher/[place] to call system/Physically safe environment - no spills, clutter or unnecessary equipment/Purposeful proactive rounding/Room/bathroom lighting operational, light cord in reach

## 2023-11-24 NOTE — ED PROVIDER NOTE - PATIENT PORTAL LINK FT
You can access the FollowMyHealth Patient Portal offered by [facility] by registering at the following website: [email or web address]. By joining [facility]’s FollowMyHealth portal, you will also be able to view your health information using other applications (apps) compatible with our system.

## 2023-11-25 ENCOUNTER — EMERGENCY (EMERGENCY)
Facility: HOSPITAL | Age: 48
LOS: 1 days | Discharge: HOME | End: 2023-11-25
Attending: STUDENT IN AN ORGANIZED HEALTH CARE EDUCATION/TRAINING PROGRAM | Admitting: STUDENT IN AN ORGANIZED HEALTH CARE EDUCATION/TRAINING PROGRAM
Payer: MEDICARE

## 2023-11-25 VITALS
TEMPERATURE: 98 F | WEIGHT: 229.94 LBS | HEIGHT: 71 IN | RESPIRATION RATE: 17 BRPM | OXYGEN SATURATION: 97 % | SYSTOLIC BLOOD PRESSURE: 146 MMHG | DIASTOLIC BLOOD PRESSURE: 88 MMHG | HEART RATE: 77 BPM

## 2023-11-25 DIAGNOSIS — F10.10 ALCOHOL ABUSE, UNCOMPLICATED: ICD-10-CM

## 2023-11-25 DIAGNOSIS — F17.200 NICOTINE DEPENDENCE, UNSPECIFIED, UNCOMPLICATED: ICD-10-CM

## 2023-11-25 DIAGNOSIS — F14.90 COCAINE USE, UNSPECIFIED, UNCOMPLICATED: ICD-10-CM

## 2023-11-25 DIAGNOSIS — Z59.00 HOMELESSNESS UNSPECIFIED: ICD-10-CM

## 2023-11-25 DIAGNOSIS — F14.10 COCAINE ABUSE, UNCOMPLICATED: ICD-10-CM

## 2023-11-25 DIAGNOSIS — Z91.010 ALLERGY TO PEANUTS: ICD-10-CM

## 2023-11-25 PROCEDURE — 82962 GLUCOSE BLOOD TEST: CPT

## 2023-11-25 PROCEDURE — 99282 EMERGENCY DEPT VISIT SF MDM: CPT

## 2023-11-25 PROCEDURE — 99283 EMERGENCY DEPT VISIT LOW MDM: CPT

## 2023-11-25 SDOH — ECONOMIC STABILITY - HOUSING INSECURITY: HOMELESSNESS UNSPECIFIED: Z59.00

## 2023-11-25 NOTE — ED ADULT TRIAGE NOTE - CHIEF COMPLAINT QUOTE
Pt c/o intox, "I can't stop drinking, I overdosed 2 days ago". Pt seen at [facility] last night for similar complaint, denies crack use today. Last drink 4 hours ago. C/o nausea. Denies cp, sob, tremors, vomiting. Requesting SW.

## 2023-11-25 NOTE — ED PROVIDER NOTE - OBJECTIVE STATEMENT
The pt is a 47 y/o M, who was seen in ED last night (for same) and dc'd w/outpatient detox resources - returns to ED today, seeking placement and wanting to speak to SW. Admits to crack cocaine use, alcohol use (last drink 4 hrs ago). Admits to being homeless. States "my body is failing". Denies tremors, n/v/d, abd pain, cp, sob.

## 2023-11-25 NOTE — ED PROVIDER NOTE - CLINICAL SUMMARY MEDICAL DECISION MAKING FREE TEXT BOX
pt returns to ED requesting detox placement - was seen for same last night and saw SW, returns today w/o any physical c/o but wanting sw and detox placement again - unable to explain as to why he didn't utilize the detox list provided. hemodynamically stable, no acute distress, no tremors, no signs of withdrawal. sw aware of pt being back and will speak to him, hemodynamically stable for dc. no si/hi. suspect 2/2 gain for ed visit given cold weather conditions

## 2023-11-25 NOTE — ED ADULT NURSE NOTE - CHIEF COMPLAINT QUOTE
Pt c/o intox, "I can't stop drinking, I overdosed 2 days ago". Pt seen at [facility] last night for similar complaint, denies crack use today. Last drink 4 hours ago. C/o nausea. Denies cp, sob, tremors, vomiting. Requesting SW.

## 2023-11-25 NOTE — ED ADULT NURSE NOTE - NSFALLUNIVINTERV_ED_ALL_ED
Bed/Stretcher in lowest position, wheels locked, appropriate side rails in place/Call bell, personal items and telephone in reach/Instruct patient to call for assistance before getting out of bed/chair/stretcher/Non-slip footwear applied when patient is off stretcher/[place] to call system/Physically safe environment - no spills, clutter or unnecessary equipment/Purposeful proactive rounding/Room/bathroom lighting operational, light cord in reach

## 2023-11-25 NOTE — ED PROVIDER NOTE - PATIENT PORTAL LINK FT
You can access the FollowMyHealth Patient Portal offered by [facility] by registering at the following website: [email or web address]. By joining [facility]’s FollowMyHealth portal, you will also be able to view your health information using other applications (apps) compatible with our system.

## 2023-11-27 DIAGNOSIS — Z91.010 ALLERGY TO PEANUTS: ICD-10-CM

## 2023-11-27 DIAGNOSIS — R41.82 ALTERED MENTAL STATUS, UNSPECIFIED: ICD-10-CM

## 2023-11-27 DIAGNOSIS — R07.89 OTHER CHEST PAIN: ICD-10-CM

## 2023-12-16 ENCOUNTER — EMERGENCY (EMERGENCY)
Facility: HOSPITAL | Age: 48
LOS: 1 days | Discharge: HOME | End: 2023-12-16
Attending: EMERGENCY MEDICINE | Admitting: EMERGENCY MEDICINE
Payer: MEDICARE

## 2023-12-16 VITALS
HEART RATE: 73 BPM | OXYGEN SATURATION: 98 % | WEIGHT: 229.28 LBS | RESPIRATION RATE: 16 BRPM | DIASTOLIC BLOOD PRESSURE: 85 MMHG | SYSTOLIC BLOOD PRESSURE: 156 MMHG | HEIGHT: 71 IN | TEMPERATURE: 98 F

## 2023-12-16 DIAGNOSIS — K21.9 GASTRO-ESOPHAGEAL REFLUX DISEASE WITHOUT ESOPHAGITIS: ICD-10-CM

## 2023-12-16 DIAGNOSIS — R45.851 SUICIDAL IDEATIONS: ICD-10-CM

## 2023-12-16 DIAGNOSIS — F43.25 ADJUSTMENT DISORDER WITH MIXED DISTURBANCE OF EMOTIONS AND CONDUCT: ICD-10-CM

## 2023-12-16 DIAGNOSIS — F10.90 ALCOHOL USE, UNSPECIFIED, UNCOMPLICATED: ICD-10-CM

## 2023-12-16 DIAGNOSIS — F60.2 ANTISOCIAL PERSONALITY DISORDER: ICD-10-CM

## 2023-12-16 DIAGNOSIS — F12.90 CANNABIS USE, UNSPECIFIED, UNCOMPLICATED: ICD-10-CM

## 2023-12-16 DIAGNOSIS — B00.9 HERPESVIRAL INFECTION, UNSPECIFIED: ICD-10-CM

## 2023-12-16 DIAGNOSIS — F17.200 NICOTINE DEPENDENCE, UNSPECIFIED, UNCOMPLICATED: ICD-10-CM

## 2023-12-16 DIAGNOSIS — F19.94 OTHER PSYCHOACTIVE SUBSTANCE USE, UNSPECIFIED WITH PSYCHOACTIVE SUBSTANCE-INDUCED MOOD DISORDER: ICD-10-CM

## 2023-12-16 DIAGNOSIS — E11.9 TYPE 2 DIABETES MELLITUS WITHOUT COMPLICATIONS: ICD-10-CM

## 2023-12-16 DIAGNOSIS — Z91.010 ALLERGY TO PEANUTS: ICD-10-CM

## 2023-12-16 LAB
AMPHET UR-MCNC: NEGATIVE — SIGNIFICANT CHANGE UP
AMPHET UR-MCNC: NEGATIVE — SIGNIFICANT CHANGE UP
APPEARANCE UR: CLEAR — SIGNIFICANT CHANGE UP
APPEARANCE UR: CLEAR — SIGNIFICANT CHANGE UP
BACTERIA # UR AUTO: NEGATIVE /HPF — SIGNIFICANT CHANGE UP
BACTERIA # UR AUTO: NEGATIVE /HPF — SIGNIFICANT CHANGE UP
BARBITURATES UR SCN-MCNC: NEGATIVE — SIGNIFICANT CHANGE UP
BARBITURATES UR SCN-MCNC: NEGATIVE — SIGNIFICANT CHANGE UP
BENZODIAZ UR-MCNC: POSITIVE
BENZODIAZ UR-MCNC: POSITIVE
BILIRUB UR-MCNC: NEGATIVE — SIGNIFICANT CHANGE UP
BILIRUB UR-MCNC: NEGATIVE — SIGNIFICANT CHANGE UP
CAST: 0 /LPF — SIGNIFICANT CHANGE UP (ref 0–4)
CAST: 0 /LPF — SIGNIFICANT CHANGE UP (ref 0–4)
COCAINE METAB.OTHER UR-MCNC: POSITIVE
COCAINE METAB.OTHER UR-MCNC: POSITIVE
COLOR SPEC: SIGNIFICANT CHANGE UP
COLOR SPEC: SIGNIFICANT CHANGE UP
DIFF PNL FLD: NEGATIVE — SIGNIFICANT CHANGE UP
DIFF PNL FLD: NEGATIVE — SIGNIFICANT CHANGE UP
GLUCOSE UR QL: NEGATIVE MG/DL — SIGNIFICANT CHANGE UP
GLUCOSE UR QL: NEGATIVE MG/DL — SIGNIFICANT CHANGE UP
KETONES UR-MCNC: ABNORMAL MG/DL
KETONES UR-MCNC: ABNORMAL MG/DL
LEUKOCYTE ESTERASE UR-ACNC: NEGATIVE — SIGNIFICANT CHANGE UP
LEUKOCYTE ESTERASE UR-ACNC: NEGATIVE — SIGNIFICANT CHANGE UP
METHADONE UR-MCNC: NEGATIVE — SIGNIFICANT CHANGE UP
METHADONE UR-MCNC: NEGATIVE — SIGNIFICANT CHANGE UP
NITRITE UR-MCNC: NEGATIVE — SIGNIFICANT CHANGE UP
NITRITE UR-MCNC: NEGATIVE — SIGNIFICANT CHANGE UP
OPIATES UR-MCNC: NEGATIVE — SIGNIFICANT CHANGE UP
OPIATES UR-MCNC: NEGATIVE — SIGNIFICANT CHANGE UP
PCP SPEC-MCNC: SIGNIFICANT CHANGE UP
PCP SPEC-MCNC: SIGNIFICANT CHANGE UP
PCP UR-MCNC: NEGATIVE — SIGNIFICANT CHANGE UP
PCP UR-MCNC: NEGATIVE — SIGNIFICANT CHANGE UP
PH UR: 5.5 — SIGNIFICANT CHANGE UP (ref 5–8)
PH UR: 5.5 — SIGNIFICANT CHANGE UP (ref 5–8)
PROT UR-MCNC: 30 MG/DL
PROT UR-MCNC: 30 MG/DL
RBC CASTS # UR COMP ASSIST: 3 /HPF — SIGNIFICANT CHANGE UP (ref 0–4)
RBC CASTS # UR COMP ASSIST: 3 /HPF — SIGNIFICANT CHANGE UP (ref 0–4)
SP GR SPEC: >1.03 — HIGH (ref 1–1.03)
SP GR SPEC: >1.03 — HIGH (ref 1–1.03)
SQUAMOUS # UR AUTO: 0 /HPF — SIGNIFICANT CHANGE UP (ref 0–5)
SQUAMOUS # UR AUTO: 0 /HPF — SIGNIFICANT CHANGE UP (ref 0–5)
THC UR QL: POSITIVE
THC UR QL: POSITIVE
UROBILINOGEN FLD QL: 1 MG/DL — SIGNIFICANT CHANGE UP (ref 0.2–1)
UROBILINOGEN FLD QL: 1 MG/DL — SIGNIFICANT CHANGE UP (ref 0.2–1)
WBC UR QL: 0 /HPF — SIGNIFICANT CHANGE UP (ref 0–5)
WBC UR QL: 0 /HPF — SIGNIFICANT CHANGE UP (ref 0–5)

## 2023-12-16 PROCEDURE — 80307 DRUG TEST PRSMV CHEM ANLYZR: CPT

## 2023-12-16 PROCEDURE — 99285 EMERGENCY DEPT VISIT HI MDM: CPT

## 2023-12-16 PROCEDURE — 99284 EMERGENCY DEPT VISIT MOD MDM: CPT | Mod: 25

## 2023-12-16 PROCEDURE — 93005 ELECTROCARDIOGRAM TRACING: CPT

## 2023-12-16 PROCEDURE — 93010 ELECTROCARDIOGRAM REPORT: CPT

## 2023-12-16 PROCEDURE — 81001 URINALYSIS AUTO W/SCOPE: CPT

## 2023-12-16 NOTE — ED PROVIDER NOTE - OBJECTIVE STATEMENT
47 y/o m hx polysubstance abuse presents c/o feeling like harming himself for the past day.  Pt stating he is having thoughts of "hanging myself", reports he doesn't have any plan to do so, having thoughts about it.  Pt reports last heroin use was 2 days ago.  Denies HI, AH/VH, all other ROS negative.

## 2023-12-16 NOTE — ED ADULT NURSE NOTE - OBJECTIVE STATEMENT
48yM presents to ED endorsing SI with a plan. States, "to hang myself." Denies HI, AH, VH. Admits to using heroin "2 days ago". Pt ambulatory by self. No injuries or self harm noted. Spon breathing on RA, unlabored.

## 2023-12-16 NOTE — ED ADULT NURSE NOTE - NSFALLUNIVINTERV_ED_ALL_ED
Bed/Stretcher in lowest position, wheels locked, appropriate side rails in place/Call bell, personal items and telephone in reach/Instruct patient to call for assistance before getting out of bed/chair/stretcher/Non-slip footwear applied when patient is off stretcher/[place] to call system/Physically safe environment - no spills, clutter or unnecessary equipment/Purposeful proactive rounding/Room/bathroom lighting operational, light cord in reach Bed/Stretcher in lowest position, wheels locked, appropriate side rails in place/Call bell, personal items and telephone in reach/Instruct patient to call for assistance before getting out of bed/chair/stretcher/Non-slip footwear applied when patient is off stretcher/[place] to call system/Physically safe environment - no spills, clutter or unnecessary equipment/Purposeful proactive rounding/Room/bathroom lighting operational, light cord in reach

## 2023-12-16 NOTE — ED ADULT TRIAGE NOTE - CHIEF COMPLAINT QUOTE
Pt, with no reported PMH, presents to ER c/o SI with plan "to hang myself" for ~24hrs. Pt denies any HI or AH/VH at this time. Pt denies any past attempts to harm self. Admits to using heroin "2 days ago". Pt denies any medical complaints at this time.

## 2023-12-16 NOTE — ED BEHAVIORAL HEALTH ASSESSMENT NOTE - DIFFERENTIAL
Adjustment Disorder with mixed anxiety and depressed mood  Substance Induced Mood Disorder  Antisocial Personality Disorder  Cocaine Use Disorder  Alcohol Use Disorder  Cannabis Use Disorder Adjustment Disorder with disturbance of conduct  Substance Induced Mood Disorder  Antisocial Personality Disorder  Polysubstance Use Disorder  Alcohol Use Disorder  Cannabis Use Disorder Adjustment Disorder with disturbance of conduct  Substance Induced Mood Disorder  Antisocial Personality Disorder  Cocaine Use Disorder  Alcohol Use Disorder  Cannabis Use Disorder  Opioid Use Disorder

## 2023-12-16 NOTE — ED PROVIDER NOTE - ENMT, MLM
[facility] HOSPITALIST PROGRESS NOTE    Subjective  Continues to be in pain epigastric and left-sided abdominal pain.  Cannot tolerate p.o. [place].  Has NJ tube feeds at 50 an hour tolerating well.  She is not constipated, very anxious    Objective    Vitals    Vital Signs (last 24 hrs)_____  Last Charted___________  Temp Oral      98.5 F  (DEC 25 06:34)  Heart Rate Peripheral   74 bpm  (DEC 25 06:34)  Resp Rate          18 br/min  (DEC 25 06:34)  SBP      130 mmHg  (DEC 25 06:34)  DBP      86 mmHg  (DEC 25 06:34)    Physical Examination    Gen- awake and alert, anxious about her abdominal pain and pseudocyst.  LUNGS:  CTA-B, no wheezing or crackles.  heart- RRR, no murmur  ABDOMEN:  Soft.  Upper and lower mostly left-sided abdominal pain and tenderness .  No guarding or rigidity.  Gastric tenderness  EXTREMITIES:  No edema    Laboratory Data/Radiology/Microbiology    Labs (Last four charted values)  WBC                  7.6 (DEC 25) 8.6 (DEC 24) 8.4 (DEC 22) 10.2 (DEC 20)   Hgb                  L 8.5 (DEC 25) L 8.2 (DEC 24) L 8.0 (DEC 22) L 8.7 (DEC 20)   Hct                  L 26 (DEC 25) L 26 (DEC 24) L 26 (DEC 22) L 28 (DEC 20)   Plt                  319 (DEC 25) 316 (DEC 24) 362 (DEC 22) 355 (DEC 20)   Na                   138 (DEC 25) 139 (DEC 24) 145 (DEC 22) 139 (DEC 20)   K                    3.5 (DEC 25) L 3.2 (DEC 24) 3.7 (DEC 23) L 3.4 (DEC 22)   CO2                  29 (DEC 25) 27 (DEC 24) 25 (DEC 22) 22 (DEC 20)   Cl                   101 (DEC 25) 101 (DEC 24) H 110 (DEC 22) H 111 (DEC 20)   Cr                   L 0.54 (DEC 25) L 0.52 (DEC 24) 0.61 (DEC 22) 0.65 (DEC 20)   BUN                  7 (DEC 25) L 5 (DEC 24) 8 (DEC 22) 10 (DEC 20)   Glucose              H 107 (DEC 25) H 117 (DEC 24) 87 (DEC 22) H 102 (DEC 20)   Mg                   L 1.3 (DEC 24) 1.6 (DEC 19)   Ca                   8.8 (DEC 25) 8.4 (DEC 24) 8.4 (DEC 22) L 8.3 (DEC 20)   PT                   H 12.8 (DEC 18)   INR                   H 1.3 (DEC 18)     ASSESSMENT AND PLAN    Recurrent severe pancreatitis and pseudocyst s/p ERCP with removal of sludge and endoscopic placement of NJ tube (12/18) with history of recent gallstone pancreatits with recent ERCP with stent placement and laparoscopy cholecystectomy 12/4/17   XR ERCP shows no choledocholithiasis   CT A/P shows acute severe pancreatitis w/ e/o pseudocysts, largest 8.3 cm at body of pancreas and promiment   inflammatory changes surrounding pancreas and extending into the mesentry   Continue NG flushing as ordered   Keep NPO for 6 weeks to rest pancreas as per GI.   Pt will need NJ feedings for 6 weeks, continues to tolerate tube feeds with clear liquid supplements    Continue Levaquin and Flagyl   Lipase 417   Continue supportive care and pain control   Encourage ambulation   Monitor strict I/O's   Nutrition & GI recs appreciated; d/w Dr. fragoso, decompression of the pseudocyst cannot be done for at least 2    more weeks.  No plans for GI intervention. Discussed with the patient and family    Pain management consulted; on morphine 2 mg q2h, will discuss with Dr. Melvin questionable start PCA pump with morphine.    Acute hypoxic respiratory distress injury to large left pleural effusion secondary to pancreatitis    CT pulm angio shows no e/o acute PE but shows inc L pleural effusion with compressive atelectasis in LLL and   lingula, as well as subsegmental atelectasis in the medial basal segment of the Right lower lobe   CXR shows combination of a left pleural effusion and atelectasis on the underlying L lung   Oxygenating on 3L O2 NC; wean as tolerated   Respiratory support with albuterol HFA. Encourage IS use   Continue IV Lasix   IVF d/c'd   Monitor respiratory status     SIRS secondary to severe necrotizing pancreatitis and possible pneumonia- resolving    e/p tachycardia (resolved) leukocytosis (resolved), and neutrophilia (resolved)   KUB shows small to moderate infiltration in L lung  base with small L effusion   Blood Cx NGTD, final    Pt remains afebrile   Lactic Acid w/n/l   Continue Levaquin and Flagyl; Zosyn d/c'ed     Hypokalemia and hypomagnesemia, 2/2 decreased PO intake   Replete as needed   Monitoring BMP     Acute blood loss anemia 2/2 above - Hgb stable. No s/o active bleeding; monitor H/H closely  Severe obesity, BMI 38- Lifestyle and dietary modifications advised on this admission  Hypertension - BP stable. Hold home meds for now.  Asthma - no acute exacerbation seen on this admission    DVT PPx: Lovenox, SCDs    PCP: Dr. Deras updated on admission    Disposition: Pending clinical improvement in above and pain control. Timing remains unclear.  Needs nursing home placement for tube feeding.    All labs and imaging studies have been reviewed.  All patient questions have been answered.    Charting performed by bulmaro Jacobson for Dr. Michele Mercado     All medical record entries made by the scribe were at my direction. I have reviewed the chart and agree that the record accurately reflects my personal performance of the history, physical exam, hospital course, and assessment and plan.         Airway patent

## 2023-12-16 NOTE — ED PROVIDER NOTE - PATIENT PORTAL LINK FT
You can access the FollowMyHealth Patient Portal offered by [facility] by registering at the following website: [email or web address]. By joining [facility]’s FollowMyHealth portal, you will also be able to view your health information using other applications (apps) compatible with our system. You can access the FollowMyHealth Patient Portal offered by [facility] by registering at the following website: [email or web address]. By joining [employer]’s FollowMyHealth portal, you will also be able to view your health information using other applications (apps) compatible with our system.

## 2023-12-16 NOTE — ED PROVIDER NOTE - ATTENDING APP SHARED VISIT CONTRIBUTION OF CARE
Vitals wnl, exam as above.  Ua no infection. Utox + for benzo, THC, cocaine.   Evaluated by psych, recommending outpt f/u.  Clinically no specific toxidrome or withdrawal syndrome.   Discussed importance of outpt follow up and return precautions. Clinically no indication for further emergent ED workup or hospitalization at this time. Stable for dc, outpt f/u.

## 2023-12-16 NOTE — ED BEHAVIORAL HEALTH ASSESSMENT NOTE - NSCURPASTPSYDX_PSY_ALL_CORE
Mood disorder/Alcohol/Substance Use disorders/Cluster B Personality disorder/traits Mood disorder/Alcohol/Substance Use disorders/Cluster B Personality disorder/traits/Conduct problems

## 2023-12-16 NOTE — ED BEHAVIORAL HEALTH ASSESSMENT NOTE - DETAILS
The patient was offered an opportunity to complete a Safety Plan but refused. Disposition discussed with ED attending. See HPI as per records, hx of acting out aggressive behavior on inpatient units when he is close to discharge or does not get what he asked for history of  service in the [employer] from 2008 to 2012 but he denies seeing any combat. Prior documentation indicates that the patient has reported that his brother  of a drug overdose although the patient currently denies any family psychiatric history or SAs.

## 2023-12-16 NOTE — ED BEHAVIORAL HEALTH ASSESSMENT NOTE - PATIENT'S CHIEF COMPLAINT
"I've been having thoughts of hurting myself." "I've been having thoughts of wanting to kill myself for the last day.  All the time.  Not sure why.  Can I make this phone call to cancel my card?  Its probably going to take 45 minutes."

## 2023-12-16 NOTE — ED PROVIDER NOTE - CLINICAL SUMMARY MEDICAL DECISION MAKING FREE TEXT BOX
49 y/o m presents c/o having thoughts of self harm over the past day.  Pt calm in ED, refusing bloodwork, no signs of intox on exam.  Psych consulted, will eval in ED, f/u recs. 47 y/o m presents c/o having thoughts of self harm over the past day.  Pt calm in ED, refusing bloodwork, no signs of intox on exam.  Psych consulted, will eval in ED, f/u recs.

## 2023-12-16 NOTE — ED BEHAVIORAL HEALTH ASSESSMENT NOTE - MEDICATIONS (PRESCRIPTIONS, DIRECTIONS)
continue home medications Zoloft 150 mg PO daily, Seroquel 200 mg PO qHs, and Ambien 10 mg PO qHs for insomnia.

## 2023-12-16 NOTE — ED BEHAVIORAL HEALTH ASSESSMENT NOTE - DESCRIPTION
Patient was calm and cooperative throughout his ED stay.  No PRNs required. The patient was born in [place] and raised in [place] by his biological mother and biological father along with three brothers and one sister.  The patietn is currenlty single, unemployed (on SSD), domiciled alone in a private apartment in [facility] [place] [facility] for the past 6 months.  The patient reports a history of  service in the [employer] from 2008 to 2012 but he denies seeing any combat.  The patient is [employer].  He completed a BA in Psychology at [employer] (2006) The patient was born in [place] and raised in [place] by his biological mother and biological father along with three brothers and one sister.  The patietn is currenlty single, unemployed (on SSD), domiciled alone in a private apartment in [facility] for the past 6 months.  The patient reports a history of  service in the [employer] from 2008 to 2012 but he denies seeing any combat.  The patient is [place].  He completed a BA in Psychology at [employer] (2006) GERD, HSV, HTN, Diabetes mellitus Type 2 The patient was calm in the ED but refused blood work.  On approach, the patient was observed making a phone call to cancel his card.

## 2023-12-16 NOTE — ED BEHAVIORAL HEALTH ASSESSMENT NOTE - NSBHATTESTBILLING_PSY_A_CORE
[id number]-Acojhaztezz diagnostic evaluation with medical services [id number]-Iofmltuybnt diagnostic evaluation with medical services

## 2023-12-16 NOTE — ED BEHAVIORAL HEALTH ASSESSMENT NOTE - SAFETY PLAN ADDT'L DETAILS
Education provided regarding environmental safety / lethal means restriction/Provision of National Suicide Prevention Lifeline 2-797-084-TALK (4293) Education provided regarding environmental safety / lethal means restriction/Provision of National Suicide Prevention Lifeline 2-938-187-TALK (4175)

## 2023-12-16 NOTE — ED BEHAVIORAL HEALTH ASSESSMENT NOTE - SUMMARY
47 year old  male, single, unemployed (on SSD), domiciled alone in a private apartment in [facility] for the past 6 months with PMH GERD, Diabetes mellitus Type 2, Obesity, a well documented history of admissions to various medical facilities in what is suspected to be attempts to avoid homeless shelters with chronologically correlating ED visits verbalizing suicidal ideation and request for admission with long charted history of "throwing away" medications on discharge and not following up on outpatient referrals, history of multiple prior inpatient psychiatric admissions (most recently at [facility] 01/13/23 - 02/08/23l), + long term, continuous Polysubstance Abuse (Tobacco, Alcohol, Cocaine, Cannabis, Opioid), history of multiple [facility] rehab/detox (most recently at [facility] (04/20/23 - 04/25/23), history of multiple arrests for drug possession; extensive inpatient psychiatric documentation outlining Antisocial Personality Traits and intentional acting out behaviors including threats when he was close to discharge (ie. ..."in his room, he became agitated and violent at night and needed IM prns and seclusion.  He flipped a table and threatened to punch his roommate.  He then urinated in the Seclusion room.  He reported that he would shoot himself if he was discharged"), with no history of suicide attempts/NSSIB, not currently engaged in outpatient psychiatric treatment (documented history of patient not following up despite referrals prior to discharge) BIB self to [facility] ED reporting vague SI.    On evaluation, the patient is calm, superficially cooperative, with euthymic affect, demonstrating good behavioral control and linear thought processes.  The patient reports chronic SI for the past year without intent or plan as well as vague depressive symptoms.  The patient is goal-directed and focused on making his needs known and met.  The patient seems concerned primarily with admission for shelter and uses statements about SI as a means to that end. The patient additionally reports an interval history that is selectively vague, volitionally provocative and inconsistent with prior documentation. On MSE patient was found to lack any genuine distress or objectively observable findings of decompensated depressive, manic, psychotic, anxiety/panic, personality disorder, intoxication, or withdrawal.  The patient's affect is full and reactive, incongruent with the reported mood.  There is no evidence to support a treatable psychiatric condition beyond substance use disorder at this time.  The patient has numerous chronic risk factor for harm to self or others (male gender, active substance use, multiple medical comorbidities, history of treatment non-compliance, antisocial traits, poor coping skills and acting out behaviors), but the patient is organized, help-seeking, forward-thinking with good social support, no history of SA/NSSIB, and no current manic or psychotic symptoms.  The patient's chronic risk factors are unlikely to be modified by an inpatient psychiatric admission.  Patient is not at increased risk from baseline at this time.  Given that the patient does not present with imminent danger to self/others, he does not meet criteria for psychiatric hospitalization or further observation. There is no clear evidence that there would be significant benefit or modification of risk factors from treatment in an inpatient setting.  Patient is stable for discharge. Any impulsive and/or violent actions taken after discharge would be volitional be better characterized as provocative acting out, goal-directed &?criminal in nature as opposed to the result of acute psychiatric illness. Overall patient's presentation at this encounter is best characterized as secondary gain seeking in the absence of any genuine psychiatric disturbance and is consistent with Adjustment Disorder with mixed anxiety and depressed mood versus Substance Induced Mood Disorder.  The patient is psychiatrically cleared for discharge to self.  The patient would benefit from outpatient psychiatric follow-up and GILBERT treatment. 47 year old  male, single, unemployed (on SSD), domiciled alone in a private apartment in [facility] for the past 6 months with PMH GERD, Diabetes mellitus Type 2, Obesity, a well documented history of admissions to various medical facilities in what is suspected to be attempts to avoid homeless shelters with chronologically correlating ED visits verbalizing suicidal ideation and request for admission with long charted history of "throwing away" medications on discharge and not following up on outpatient referrals, history of multiple prior inpatient psychiatric admissions (most recently at [facility] 01/13/23 - 02/08/23l), + long term, continuous Polysubstance Abuse (Tobacco, Alcohol, Cocaine, Cannabis, Opioid), history of multiple [facility]ERT rehab/detox (most recently at [facility] (04/20/23 - 04/25/23), history of multiple arrests for drug possession; extensive inpatient psychiatric documentation outlining Antisocial Personality Traits and intentional acting out behaviors including threats when he was close to discharge (ie. ..."in his room, he became agitated and violent at night and needed IM prns and seclusion.  He flipped a table and threatened to punch his roommate.  He then urinated in the Seclusion room.  He reported that he would shoot himself if he was discharged"), with no history of suicide attempts/NSSIB, not currently engaged in outpatient psychiatric treatment (documented history of patient not following up despite referrals prior to discharge) BIB self to [facility] ED reporting vague SI.    On evaluation, the patient is calm, superficially cooperative, with euthymic affect, demonstrating good behavioral control and linear thought processes.  The patient reports chronic SI for the past year without intent or plan as well as vague depressive symptoms.  The patient is goal-directed and focused on making his needs known and met.  The patient seems concerned primarily with admission for shelter and uses statements about SI as a means to that end. The patient additionally reports an interval history that is selectively vague, volitionally provocative and inconsistent with prior documentation. On MSE patient was found to lack any genuine distress or objectively observable findings of decompensated depressive, manic, psychotic, anxiety/panic, personality disorder, intoxication, or withdrawal.  The patient's affect is full and reactive, incongruent with the reported mood.  There is no evidence to support a treatable psychiatric condition beyond substance use disorder at this time.  The patient has numerous chronic risk factor for harm to self or others (male gender, active substance use, multiple medical comorbidities, history of treatment non-compliance, antisocial traits, poor coping skills and acting out behaviors), but the patient is organized, help-seeking, forward-thinking with good social support, no history of SA/NSSIB, and no current manic or psychotic symptoms.  The patient's chronic risk factors are unlikely to be modified by an inpatient psychiatric admission.  Patient is not at increased risk from baseline at this time.  Given that the patient does not present with imminent danger to self/others, he does not meet criteria for psychiatric hospitalization or further observation. There is no clear evidence that there would be significant benefit or modification of risk factors from treatment in an inpatient setting.  Patient is stable for discharge. Any impulsive and/or violent actions taken after discharge would be volitional be better characterized as provocative acting out, goal-directed &?criminal in nature as opposed to the result of acute psychiatric illness. Overall patient's presentation at this encounter is best characterized as secondary gain seeking in the absence of any genuine psychiatric disturbance and is consistent with Adjustment Disorder with mixed anxiety and depressed mood versus Substance Induced Mood Disorder.  The patient is psychiatrically cleared for discharge to self.  The patient would benefit from outpatient psychiatric follow-up and GILBERT treatment. 47 year old  male, single, unemployed (on SSD), domiciled alone in a private apartment in [facility] for the past 6 months with PMH GERD, Diabetes mellitus Type 2, Obesity, a well documented history of admissions to various medical facilities in what is suspected to be attempts to avoid homeless shelters with chronologically correlating ED visits verbalizing suicidal ideation and request for admission with long charted history of "throwing away" medications on discharge and not following up on outpatient referrals, history of multiple prior inpatient psychiatric admissions (most recently at [facility] 01/13/23 - 02/08/23l), + long term, continuous Polysubstance Abuse (Tobacco, Alcohol, Cocaine, Cannabis, Opioid), history of multiple [facility] rehab/detox (most recently at [facility] (04/20/23 - 04/25/23), history of multiple arrests for drug possession; extensive inpatient psychiatric documentation outlining Antisocial Personality Traits and intentional acting out behaviors including threats when he was close to discharge (ie. ..."in his room, he became agitated and violent at night and needed IM prns and seclusion.  He flipped a table and threatened to punch his roommate.  He then urinated in the Seclusion room.  He reported that he would shoot himself if he was discharged"), with no history of suicide attempts/NSSIB, not currently engaged in outpatient psychiatric treatment (documented history of patient not following up despite referrals prior to discharge) BIB self to [facility] ED reporting vague SI.    On evaluation, the patient is calm, superficially cooperative, with euthymic affect, demonstrating good behavioral control and linear thought processes.  The patient reports vague SI for the past day without clear intent, plan or means accompanied by vague depressive symptoms.  The patient is goal-directed and focused on making his needs known and met as evidence by his phone call to cancel his stolen card.  The patient seems concerned primarily with admission for shelter and uses statements about SI as a means to that end. The patient additionally reports an interval history that is selectively vague, volitionally provocative and inconsistent with prior documentation. On MSE patient was found to lack any genuine distress or objectively observable findings of decompensated depressive, manic, psychotic, anxiety/panic, personality disorder, intoxication, or withdrawal.  The patient's affect is full and reactive, incongruent with the reported mood.  There is no evidence to support a treatable psychiatric condition beyond substance use disorder at this time.  The patient has numerous chronic risk factor for harm to self or others (male gender, active substance use, multiple medical comorbidities, history of treatment non-compliance, antisocial traits, poor coping skills and acting out behaviors), but the patient is organized, help-seeking, forward-thinking with good social support, no history of SA/NSSIB, and no current manic or psychotic symptoms.  The patient's chronic risk factors are unlikely to be modified by an inpatient psychiatric admission.  Patient is not at increased risk from baseline at this time.  Given that the patient does not present with imminent danger to self/others, he does not meet criteria for psychiatric hospitalization or further observation. There is no clear evidence that there would be significant benefit or modification of risk factors from treatment in an inpatient setting.  Patient is stable for discharge. Any impulsive and/or violent actions taken after discharge would be volitional be better characterized as provocative acting out, goal-directed &?criminal in nature as opposed to the result of acute psychiatric illness. Overall patient's presentation at this encounter is best characterized as secondary gain seeking in the absence of any genuine psychiatric disturbance and is consistent with Adjustment Disorder with disturbance of conduct versus Substance Induced Mood Disorder.  The patient is psychiatrically cleared for discharge to [facility].  The patient would benefit from outpatient psychiatric follow-up and [facility]ERT treatment.    Plan:  - Patient psychiatrically cleared for discharge to [facility]  - ISABELA/VRA chronically elevated but not acutely; patient is offered an opportunity to complete a safety plan prior to discharge but declines  - Continue home medications Zoloft 150 mg PO daily, Seroquel 200 mg PO qHs, and Ambien 10 mg PO qHs for insomnia  - Referral provided to [facility] located at [address] [address] [phone]; [facility] at [address] [place] [address] [phone].  - The patient was also provided with crisis support and referral information including [address] [place] [address] and instructed to call 911 or return to the nearest emergency room if the symptoms worsen.  - The patient does not demonstrate any high-risk psychosocial factors and collateral has been obtained to support the lack of these high-risk psychosocial factors.  The patient has adequate access to food, medication and safe shelter and is cleared for discharge with outpatient follow-up 47 year old  male, single, unemployed (on SSD), domiciled alone in a private apartment in [facility] for the past 6 months with PMH GERD, Diabetes mellitus Type 2, Obesity, a well documented history of admissions to various medical facilities in what is suspected to be attempts to avoid homeless shelters with chronologically correlating ED visits verbalizing suicidal ideation and request for admission with long charted history of "throwing away" medications on discharge and not following up on outpatient referrals, history of multiple prior inpatient psychiatric admissions (most recently at [facility] 01/13/23 - 02/08/23l), + long term, continuous Polysubstance Abuse (Tobacco, Alcohol, Cocaine, Cannabis, Opioid), history of multiple [facility] rehab/detox (most recently at [facility] (04/20/23 - 04/25/23), history of multiple arrests for drug possession; extensive inpatient psychiatric documentation outlining Antisocial Personality Traits and intentional acting out behaviors including threats when he was close to discharge (ie. ..."in his room, he became agitated and violent at night and needed IM prns and seclusion.  He flipped a table and threatened to punch his roommate.  He then urinated in the Seclusion room.  He reported that he would shoot himself if he was discharged"), with no history of suicide attempts/NSSIB, not currently engaged in outpatient psychiatric treatment (documented history of patient not following up despite referrals prior to discharge) BIB self to [facility] ED reporting vague SI.    On evaluation, the patient is calm, superficially cooperative, with euthymic affect, demonstrating good behavioral control and linear thought processes.  The patient reports vague SI for the past day without clear intent, plan or means accompanied by vague depressive symptoms.  The patient is goal-directed and focused on making his needs known and met as evidence by his phone call to cancel his stolen card.  The patient seems concerned primarily with admission for shelter and uses statements about SI as a means to that end. The patient additionally reports an interval history that is selectively vague, volitionally provocative and inconsistent with prior documentation. On MSE patient was found to lack any genuine distress or objectively observable findings of decompensated depressive, manic, psychotic, anxiety/panic, personality disorder, intoxication, or withdrawal.  The patient's affect is full and reactive, incongruent with the reported mood.  There is no evidence to support a treatable psychiatric condition beyond substance use disorder at this time.  The patient has numerous chronic risk factor for harm to self or others (male gender, active substance use, multiple medical comorbidities, history of treatment non-compliance, antisocial traits, poor coping skills and acting out behaviors), but the patient is organized, help-seeking, forward-thinking with good social support, no history of SA/NSSIB, and no current manic or psychotic symptoms.  The patient's chronic risk factors are unlikely to be modified by an inpatient psychiatric admission.  Patient is not at increased risk from baseline at this time.  Given that the patient does not present with imminent danger to self/others, he does not meet criteria for psychiatric hospitalization or further observation. There is no clear evidence that there would be significant benefit or modification of risk factors from treatment in an inpatient setting.  Patient is stable for discharge. Any impulsive and/or violent actions taken after discharge would be volitional be better characterized as provocative acting out, goal-directed &?criminal in nature as opposed to the result of acute psychiatric illness. Overall patient's presentation at this encounter is best characterized as secondary gain seeking in the absence of any genuine psychiatric disturbance and is consistent with Adjustment Disorder with disturbance of conduct versus Substance Induced Mood Disorder.  The patient is psychiatrically cleared for discharge to [facility].  The patient would benefit from outpatient psychiatric follow-up and [facility]ERT treatment.    Plan:  - Patient psychiatrically cleared for discharge to [facility]  - ISABELA/VRA chronically elevated but not acutely; patient is offered an opportunity to complete a safety plan prior to discharge but declines  - Continue home medications Zoloft 150 mg PO daily, Seroquel 200 mg PO qHs, and Ambien 10 mg PO qHs for insomnia  - Referral provided to [facility] located at [address] [address] [phone]; [facility] at [address] [place] [address] [phone].  - The patient was also provided with crisis support and referral information including [address] [place] [address] and instructed to call 911 or return to the nearest emergency room if the symptoms worsen.  - The patient does not demonstrate any high-risk psychosocial factors and collateral has been obtained to support the lack of these high-risk psychosocial factors.  The patient has adequate access to food, medication and safe shelter and is cleared for discharge with outpatient follow-up

## 2023-12-16 NOTE — ED PROVIDER NOTE - NSFOLLOWUPINSTRUCTIONS_ED_ALL_ED_FT
Take your medications as prescribed.   Stay well hydrated.  Return for fevers, persistent vomit, uncontrolled pain, worsening breathing, worsening lightheaded, worsening thoughts of self harm or harming others.  Follow up with primary doctor within 1-2 days.   Follow up with your psychiatrist within 1-2 days.   Drugs are harmful to your health!    What do I need to know about suicide prevention? You may see suicide as the only way to escape emotional or physical pain and suffering. Help is available from people who care about you, and from professionals trained in suicide prevention. Prevention includes everything you and others can do to stop you from taking your life.     What should I do if I am considering suicide?     Contact a suicide prevention organization. The following are always available to help you:   National Suicide Prevention Lifeline: 1-486.919.8466 (0-671-008-TALK)  Suicide Hotline: 1-382.594.2576 (3-943-WHOSHLE)  For a list of international numbers: https://[employer].org/find-help/international-resources/  Contact your therapist. Your doctor can give you a list of therapists if you do not have one.  Keep medicines, weapons, and alcohol out of your home.  Do not spend time alone if you have thoughts of ending your life.    What increases my risk for suicide?   Depression or mental illness such as schizophrenia or bipolar disorder  Someone close to you attempted or committed suicide, or you attempted suicide  The death of a person who was important to you, or the anniversary of a death  Relationship stress from a breakup or loss of a friendship, or divorce  Mental, physical, or sexual abuse    What are the warning signs of suicide? The following can help you and others recognize that you are struggling:   Talking about your plan for committing suicide, or wanting to read or write about death or suicide  Cutting yourself, burning your skin with cigarettes, or driving recklessly  Drug or alcohol use, not taking your prescribed medicine, or taking too much  Not wanting to spend time with others or doing things you enjoy, feeling bored, or not wanting anyone to praise you  Changes in your appetite, sleep habits, energy levels, or weight  Feeling angry, or lashing out at others  A need to give away or throw away your belongings  Often skipping work  Suddenly not taking medicine for a mental illness without talking to your healthcare provider  Suddenly not going to therapy    How are suicidal thoughts treated?   Medicines may be given to prevent mood swings, or to decrease anxiety or depression. You will need to take all medicines as directed. A sudden stop can be harmful. It may take 4 to 6 weeks for the medicine to help you feel better.    Suicide risk assessment means healthcare providers will ask questions about your suicide thoughts and plans. They will ask how often you think about suicide and if you have tried it before. They will ask if you have begun to hurt yourself, such as with cutting or reckless driving. They may ask if you have access to weapons or drugs.    A safety plan includes a list of people or groups to contact if you have suicidal feelings again. The list may include friends, family members, a spiritual leader, and others you trust. You may be asked to make a verbal agreement or sign a contract that you will not try to harm yourself.    A therapist can help you identify and change negative feelings or beliefs about yourself. This may help change the way you feel and act. A therapist can also help you find ways to cope with things that cannot be changed.    What can I do to manage depression?   Get help for drug or alcohol abuse. Drugs and alcohol can make suicidal feelings worse and make you more likely to act on them. Drugs and alcohol can also cause or increase depression.  Talk to someone you trust. Be honest about your thoughts and feelings about suicide. You can call a suicide prevention center if you do not want to talk to someone you know.  Exercise as directed. Exercise can lift your mood, give you more energy, and make it easier to sleep.  Eat a variety of healthy foods. Healthy foods include fruits, vegetables, whole-grain breads, lean meats, fish, low-fat dairy products, and beans. Try to eat regularly even if you do not feel hungry. Depression can increase from a lack of nutrition or if you are hungry for long periods of time.  Create a sleep routine. Try to go to bed and wake up at the same time every day. Let your healthcare provider know if you are having trouble sleeping.  Take your medicine and go to therapy as directed. Medicine and therapy can help you manage your mental health. Do not stop taking your medicine without talking to your healthcare provider. If you do not like the way a medicine makes you feel, you may be able to try a different medicine.    Where can I find support and more information?     National Suicide Prevention Lifeline    [place]  Phone: 6-124-471-VYXE (5741)  Web Address: http://www.suicidepreventionlifeline.org      Suicide Awareness [employer] of Education  [address]  [place], [place]  Phone: 1-653.924.3999  Web Address: http://www.save.org    Call your local emergency number (911 in the [place]), or ask someone to call if:   You do something on purpose to hurt yourself.  You make a plan to commit suicide.  When should I or someone close to me call my doctor or therapist?   You act out in anger, are reckless, or are abusing alcohol or drugs.  You have serious thoughts of suicide, even with treatment.  You have more thoughts of suicide when you are alone.  You stop eating, or you begin to smoke cigarettes or drink alcohol.  You have questions or concerns about your condition or care.    CARE AGREEMENT:  You have the right to help plan your care. Learn about your health condition and how it may be treated. Discuss treatment options with your healthcare providers to decide what care you want to receive. You always have the right to refuse treatment. Take your medications as prescribed.   Stay well hydrated.  Return for fevers, persistent vomit, uncontrolled pain, worsening breathing, worsening lightheaded, worsening thoughts of self harm or harming others.  Follow up with primary doctor within 1-2 days.   Follow up with your psychiatrist within 1-2 days.   Drugs are harmful to your health!    What do I need to know about suicide prevention? You may see suicide as the only way to escape emotional or physical pain and suffering. Help is available from people who care about you, and from professionals trained in suicide prevention. Prevention includes everything you and others can do to stop you from taking your life.     What should I do if I am considering suicide?     Contact a suicide prevention organization. The following are always available to help you:   National Suicide Prevention Lifeline: 1-478.235.3590 (3-702-512-TALK)  Suicide Hotline: 1-367.334.3985 (4-326-BEHUAJE)  For a list of international numbers: https://[employer].org/find-help/international-resources/  Contact your therapist. Your doctor can give you a list of therapists if you do not have one.  Keep medicines, weapons, and alcohol out of your home.  Do not spend time alone if you have thoughts of ending your life.    What increases my risk for suicide?   Depression or mental illness such as schizophrenia or bipolar disorder  Someone close to you attempted or committed suicide, or you attempted suicide  The death of a person who was important to you, or the anniversary of a death  Relationship stress from a breakup or loss of a friendship, or divorce  Mental, physical, or sexual abuse    What are the warning signs of suicide? The following can help you and others recognize that you are struggling:   Talking about your plan for committing suicide, or wanting to read or write about death or suicide  Cutting yourself, burning your skin with cigarettes, or driving recklessly  Drug or alcohol use, not taking your prescribed medicine, or taking too much  Not wanting to spend time with others or doing things you enjoy, feeling bored, or not wanting anyone to praise you  Changes in your appetite, sleep habits, energy levels, or weight  Feeling angry, or lashing out at others  A need to give away or throw away your belongings  Often skipping work  Suddenly not taking medicine for a mental illness without talking to your healthcare provider  Suddenly not going to therapy    How are suicidal thoughts treated?   Medicines may be given to prevent mood swings, or to decrease anxiety or depression. You will need to take all medicines as directed. A sudden stop can be harmful. It may take 4 to 6 weeks for the medicine to help you feel better.    Suicide risk assessment means healthcare providers will ask questions about your suicide thoughts and plans. They will ask how often you think about suicide and if you have tried it before. They will ask if you have begun to hurt yourself, such as with cutting or reckless driving. They may ask if you have access to weapons or drugs.    A safety plan includes a list of people or groups to contact if you have suicidal feelings again. The list may include friends, family members, a spiritual leader, and others you trust. You may be asked to make a verbal agreement or sign a contract that you will not try to harm yourself.    A therapist can help you identify and change negative feelings or beliefs about yourself. This may help change the way you feel and act. A therapist can also help you find ways to cope with things that cannot be changed.    What can I do to manage depression?   Get help for drug or alcohol abuse. Drugs and alcohol can make suicidal feelings worse and make you more likely to act on them. Drugs and alcohol can also cause or increase depression.  Talk to someone you trust. Be honest about your thoughts and feelings about suicide. You can call a suicide prevention center if you do not want to talk to someone you know.  Exercise as directed. Exercise can lift your mood, give you more energy, and make it easier to sleep.  Eat a variety of healthy foods. Healthy foods include fruits, vegetables, whole-grain breads, lean meats, fish, low-fat dairy products, and beans. Try to eat regularly even if you do not feel hungry. Depression can increase from a lack of nutrition or if you are hungry for long periods of time.  Create a sleep routine. Try to go to bed and wake up at the same time every day. Let your healthcare provider know if you are having trouble sleeping.  Take your medicine and go to therapy as directed. Medicine and therapy can help you manage your mental health. Do not stop taking your medicine without talking to your healthcare provider. If you do not like the way a medicine makes you feel, you may be able to try a different medicine.    Where can I find support and more information?     National Suicide Prevention Lifeline    [place]  Phone: 9-019-855-LSQD (1951)  Web Address: http://www.suicidepreventionlifeline.org      Suicide Awareness Vo[employer] of Education  [address]  [place], [place]  Phone: 1-264.992.4719  Web Address: http://www.save.org    Call your local emergency number (911 in the [place]), or ask someone to call if:   You do something on purpose to hurt yourself.  You make a plan to commit suicide.  When should I or someone close to me call my doctor or therapist?   You act out in anger, are reckless, or are abusing alcohol or drugs.  You have serious thoughts of suicide, even with treatment.  You have more thoughts of suicide when you are alone.  You stop eating, or you begin to smoke cigarettes or drink alcohol.  You have questions or concerns about your condition or care.    CARE AGREEMENT:  You have the right to help plan your care. Learn about your health condition and how it may be treated. Discuss treatment options with your healthcare providers to decide what care you want to receive. You always have the right to refuse treatment.

## 2023-12-16 NOTE — ED BEHAVIORAL HEALTH ASSESSMENT NOTE - NS ED BHA PLAN TR BH CONTACTED FT
A voicemail was left requesting call-back. Patient declined to provide outpatient provider's name and contact.

## 2023-12-16 NOTE — ED BEHAVIORAL HEALTH ASSESSMENT NOTE - NS ED BHA REVIEW OF ED CHART VITAL SIGNS REVIEWED
Detail Level: Generalized General Sunscreen Counseling: Educated on sun protection with sunscreen and clothing. Yes
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
## 2023-12-16 NOTE — ED BEHAVIORAL HEALTH ASSESSMENT NOTE - OTHER PAST PSYCHIATRIC HISTORY (INCLUDE DETAILS REGARDING ONSET, COURSE OF ILLNESS, INPATIENT/OUTPATIENT TREATMENT)
PSYCKES:  MEDICAID ID: [id number]  TREATMENT FOR SI: SIx10 (first on 01/10/19) most recently on 05/08/22 at [facility]  QUALITY FLAGS: High Mental Health Need, High Utilization Inpt/ER, Readmission Post-Discharge from any Hospital ( to ).  BEHAVIORAL HEALTH DIAGNOSES: Alcohol related disorders Cocaine related disorders Sedative, hypnotic, or anxiolytic related disorders Unspecified/Other Anxiety Disorder Major Depressive Disorder Other psychoactive substance related disorders Generalized Anxiety Disorder Opioid related disorders Cannabis related disorders Unspecified/Other Bipolar Obsessive-Compulsive Disorder Schizoaffective Disorder Unspecified/Other Depressive Disorder Unspecified/Other Personality Disorder Adjustment Disorder Panic Disorder Persistent Depressive Disorder Substance-Induced Depressive Disorder Tobacco related disorder  PSYCHIATRIC MEDICATIONS: Nicotine Polacrilex 2 mg 12.31/day (07/27/23), Nicotine 7mg/24h4 1/day (05/01/23)  OUTPATIENT: [facility] (08/25/21) for Alcohol Abuse Uncomplicated, Bethel Blevins (08/29/17-01/24/20; 02/16/21-06/22/21) for MDD single episode unspecified, Marla Falcon (02/23/21) for Alcohol Dependence Uncomplicated, [facility] (05/18/19) for Adjustment Disorder with Mixed Anxiety and Depressed Mood, Patrick Knapp MD (10/30/18-11/14/18) for Other Psychoactive Substance Dependence in Remission, [facility]. (08/24/18-10/01/18) for Alcohol Dependence Uncomplicated, [employer] [facility] (04/03/18) for Alcohol Dependence Uncomplicated, [facility] (12/18/17-01/12/18) for Alcohol Abuse Uncomplicated  INPATIENT: MH x6 most recently at [facility] (01/13/23 - 02/08/23) for schizoaffective disorder depressive type, [facility] (01/11/23 - 01/13/23) for schizoaffective disorder depressive type, [facility] (04/11/22 - 04/19/22) for MDD single episode unspecified, [facility] (03/16/22 - 04/04/22); [facility] x24 most recently at [facility] (04/20/23 - 04/25/23) for Alcohol Dependence Uncomplicated, [facility] (03/23/23 - 03/28/23) for Alcohol Dependence with Withdrawal Uncomplicated  ED: MH x16 most recently at [facility] (04/01/23) for MDD Single Episode Unspecified, [facility] x9 most recently at [facility] (02/14/23) Alcohol Abuse with Intoxication Uncomplicated PSYCKES:  MEDICAID ID: [id number]  TREATMENT FOR SI: SIx10 (first on 01/10/19) most recently on 05/08/22 at [facility]  QUALITY FLAGS: High Mental Health Need, High Utilization Inpt/ER, Readmission Post-Discharge from any Hospital ( to ).  BEHAVIORAL HEALTH DIAGNOSES: Alcohol related disorders Cocaine related disorders Sedative, hypnotic, or anxiolytic related disorders Unspecified/Other Anxiety Disorder Major Depressive Disorder Other psychoactive substance related disorders Generalized Anxiety Disorder Opioid related disorders Cannabis related disorders Unspecified/Other Bipolar Obsessive-Compulsive Disorder Schizoaffective Disorder Unspecified/Other Depressive Disorder Unspecified/Other Personality Disorder Adjustment Disorder Panic Disorder Persistent Depressive Disorder Substance-Induced Depressive Disorder Tobacco related disorder  PSYCHIATRIC MEDICATIONS: Nicotine Polacrilex 2 mg 12.31/day (07/27/23), Nicotine 7mg/24h4 1/day (05/01/23)  OUTPATIENT: [facility] (08/25/21) for Alcohol Abuse Uncomplicated, Bethel Blevins (08/29/17-01/24/20; 02/16/21-06/22/21) for MDD single episode unspecified, Marla Falcon (02/23/21) for Alcohol Dependence Uncomplicated, [facility] (05/18/19) for Adjustment Disorder with Mixed Anxiety and Depressed Mood, Patrick Knapp MD (10/30/18-11/14/18) for Other Psychoactive Substance Dependence in Remission, [facility] (08/24/18-10/01/18) for Alcohol Dependence Uncomplicated, [facility] (04/03/18) for Alcohol Dependence Uncomplicated, [facility] (12/18/17-01/12/18) for Alcohol Abuse Uncomplicated  INPATIENT: MH x6 most recently at [facility] (01/13/23 - 02/08/23) for schizoaffective disorder depressive type, [facility] (01/11/23 - 01/13/23) for schizoaffective disorder depressive type, [facility] (04/11/22 - 04/19/22) for MDD single episode unspecified, [facility] (03/16/22 - 04/04/22); [facility] x24 most recently at [facility] (04/20/23 - 04/25/23) for Alcohol Dependence Uncomplicated, [facility] (03/23/23 - 03/28/23) for Alcohol Dependence with Withdrawal Uncomplicated  ED: MH x16 most recently at [facility] (04/01/23) for MDD Single Episode Unspecified, [facility] x9 most recently at [facility] (02/14/23) Alcohol Abuse with Intoxication Uncomplicated

## 2023-12-16 NOTE — ED BEHAVIORAL HEALTH ASSESSMENT NOTE - REFERRAL / APPOINTMENT DETAILS
Scheduled pick-up at 5 am for transport to Inpatient [facility] detox/rehab with Ash Jacob. [facility] located at [address] [address] [phone]; [facility] at [address] [place] [address] [phone] [facility] located at [address] [address] [phone]; [facility] at [address] [place] [address] [phone]

## 2024-01-11 NOTE — BH INPATIENT PSYCHIATRY ASSESSMENT NOTE - NSBHMSESPEECH_PSY_A_CORE
Patient wife called and is requesting palliative care/hospice care/ or homehealth nurse to come out and evaluate him. She states she get him to bed last night and now she cannot get him out, states he has taken a turn. She was able to get him to take his meds this morning but that is it.   Normal volume, rate, productivity, spontaneity and articulation

## 2024-02-13 NOTE — ED PROVIDER NOTE - PRO INTERPRETER NEED 2
Tigist Richardson presents to Urgent Care Patient arriving with: alone Mother Lindsey Richardson with complaint of sore throat, fever, body aches.  Onset x4       Can leave detailed message on mobile phone:    Mobile [phone]      English

## 2024-04-08 NOTE — ED BEHAVIORAL HEALTH ASSESSMENT NOTE - CURRENTLY ENROLLED STUDENT
Photo Preface (Leave Blank If You Do Not Want): Photographs were obtained today
Detail Level: Simple
No

## 2024-04-22 NOTE — ED PROVIDER NOTE - NSICDXPASTSURGICALHX_GEN_ALL_CORE_FT
Preventive Care Visit  [facility]  KYM Farris CNP, Family Medicine  Apr 22, 2024    Assessment & Plan   19 year old 10 month old, here for preventive care.    Encounter for routine child health examination w/o abnormal findings  Mother declines meningitis B.   - BEHAVIORAL/EMOTIONAL ASSESSMENT (32432)  - SCREENING TEST, PURE TONE, AIR ONLY  - SCREENING, VISUAL ACUITY, QUANTITATIVE, BILAT    Onychomycosis  Will treat with penlac, use as directed.  Educated on its indications and side effects. Will refer to podiatry if symptoms persist.   - ciclopirox (PENLAC) 8 % external solution  Dispense: 6.6 mL; Refill: 3  - Orthopedic  Referral    Mild intermittent asthma without complication  Patient continues Albuterol as needed prior to exercise.   - albuterol (PROAIR HFA/PROVENTIL HFA/VENTOLIN HFA) 108 (90 Base) MCG/ACT inhaler  Dispense: 18 g; Refill: 2  - albuterol (PROVENTIL) (2.5 MG/3ML) 0.083% neb solution  Dispense: 75 mL; Refill: 3    Atopic dermatitis, unspecified type  He continues Triamcinolone cream as needed.    - triamcinolone (KENALOG) 0.1 % external cream  Dispense: 30 g; Refill: 3    Allergic rhinitis, unspecified seasonality, unspecified trigger  He continues Flonase, Zyrtec, and Montelukast. We discussed potential suicide ideation/mood swings with Montelukast.   - fluticasone (FLONASE) 50 MCG/ACT nasal spray  Dispense: 48 g; Refill: 11  - montelukast (SINGULAIR) 10 MG tablet  Dispense: 30 tablet; Refill: 3      Growth      Normal height and weight    Immunizations   Vaccines up to date.  MenB Vaccine  mother declines .    HIV Screening:  Parent/Patient declines HIV screening  Anticipatory Guidance    Reviewed age appropriate anticipatory guidance.     Increased responsibility    Social media    TV/ media    Healthy food choices    Family meals    Calcium     Vitamins/ supplements    Weight management    Adequate sleep/ exercise    Dental care    Drugs, ETOH, smoking     Seat belts    Bike/ sport helmets    Body changes with puberty    Safe sex/ STDs        Referrals/Ongoing Specialty Care  Referral made to podiatry  Verbal Dental Referral: Verbal dental referral was given        Subjective   Esteban is presenting for the following:  Well Child    Patient has noticed darkened toenails since sustaining a left ankle sprain multiple years ago.  This disoloration is present within his 2nd and 4th toenails.  He has not applied any over-the-counter products.  A previous provider told him that it may be skin cancer.  Patient has a history of intermittent asthma which flares with exercise.  He uses albuterol prior to exercise.  He has a history of allergic rhinitis and takes cetirizine and Singulair daily.  He uses triamcinolone cream as needed for eczema.      4/22/2024     2:55 PM   Additional Questions   Questions for today's visit Yes   Questions toenail black left middle since 2018   Surgery, major illness, or injury since last physical No           4/22/2024   Social   Lives with Parent(s)    Sibling(s)   Recent potential stressors None   History of trauma No   Family Hx of mental health challenges No   Lack of transportation has limited access to appts/meds No   Do you have housing?  Yes   Are you worried about losing your housing? No         4/22/2024     2:50 PM   Health Risks/Safety   Does your adolescent always wear a seat belt? Yes   Helmet use? Yes   Do you have guns/firearms in the home? No         4/22/2024     2:50 PM   TB Screening   Was your adolescent born outside of the [place]? No         4/22/2024     2:50 PM   TB Screening: Consider immunosuppression as a risk factor for TB   Recent TB infection or positive TB test in family/close contacts No   Recent travel outside [place] (child/family/close contacts) No   Recent residence in high-risk group setting (correctional facility/health care facility/homeless shelter/refugee camp) No          4/22/2024     2:50 PM  "  Dyslipidemia   FH: premature cardiovascular disease (!) UNKNOWN   FH: hyperlipidemia Unknown   Personal risk factors for heart disease NO diabetes, high blood pressure, obesity, smokes cigarettes, kidney problems, heart or kidney transplant, history of Kawasaki disease with an aneurysm, lupus, rheumatoid arthritis, or HIV     No results for input(s): \"CHOL\", \"HDL\", \"LDL\", \"TRIG\", \"CHOLHDLRATIO\" in the last 37741 hours.        4/22/2024     2:50 PM   Sudden Cardiac Arrest and Sudden Cardiac Death Screening   History of syncope/seizure No   History of exercise-related chest pain or shortness of breath No   FH: premature death (sudden/unexpected or other) attributable to heart diseases No   FH: cardiomyopathy, ion channelopothy, Marfan syndrome, or arrhythmia No         4/22/2024     2:50 PM   Dental Screening   Has your adolescent seen a dentist? Yes   When was the last visit? Within the last 3 months   Has your adolescent had cavities in the last 3 years? No   Has your adolescent s parent(s), caregiver, or sibling(s) had any cavities in the last 2 years?  No         4/22/2024   Diet   Do you have questions about your adolescent's eating?  No   Do you have questions about your adolescent's height or weight? No   What does your adolescent regularly drink? Water    (!) MILK ALTERNATIVE (E.G. SOY, ALMOND, RIPPLE)    (!) JUICE    (!) SPORTS DRINKS   How often does your family eat meals together? (!) SOME DAYS   Servings of fruits/vegetables per day (!) 3-4   At least 3 servings of food or beverages that have calcium each day? (!) NO   In past 12 months, concerned food might run out Patient declined   In past 12 months, food has run out/couldn't afford more Patient declined           4/22/2024   Activity   Days per week of moderate/strenuous exercise 7 days   On average, how many minutes do you engage in exercise at this level? 120 min   What does your adolescent do for exercise?  Sports   What activities is your " "adolescent involved with?  commuity activties         4/22/2024     2:50 PM   Media Use   Hours per day of screen time (for entertainment) everyday   Screen in bedroom (!) YES         4/22/2024     2:50 PM   Sleep   Does your adolescent have any trouble with sleep? No    (!) EARLY MORNING AWAKENING   Daytime sleepiness/naps No         4/22/2024     2:50 PM   School   School concerns No concerns   Grade in school 12th Grade   Current school [employer]   School absences (>2 days/mo) No         4/22/2024     2:50 PM   Vision/Hearing   Vision or hearing concerns No concerns         4/22/2024     2:50 PM   Development / Social-Emotional Screen   Developmental concerns No     Psycho-Social/Depression - PSC-17 required for C&TC through age 18  General screening:  Electronic PSC       4/22/2024     2:54 PM   PSC SCORES   Inattentive / Hyperactive Symptoms Subtotal 5   Externalizing Symptoms Subtotal 2   Internalizing Symptoms Subtotal 3   PSC - 17 Total Score 10       Follow up:  PSC-17 PASS (total score <15; attention symptoms <7, externalizing symptoms <7, internalizing symptoms <5)  no follow up necessary  Teen Screen    Teen Screen completed, reviewed and scanned document within chart         Objective     Exam  /70   Pulse 59   Temp 98  F (36.7  C)   Resp 14   Ht 1.803 m (5' 11\")   Wt 73.9 kg (163 lb)   SpO2 99%   BMI 22.73 kg/m    73 %ile (Z= 0.60) based on CDC (Boys, 2-20 Years) Stature-for-age data based on Stature recorded on 4/22/2024.  72 %ile (Z= 0.59) based on CDC (Boys, 2-20 Years) weight-for-age data using vitals from 4/22/2024.  62 %ile (Z= 0.31) based on CDC (Boys, 2-20 Years) BMI-for-age based on BMI available as of 4/22/2024.  Blood pressure %aminah are 23% systolic and 54% diastolic based on the 2017 AAP Clinical Practice Guideline. This reading is in the normal blood pressure range.    Vision Screen  Vision Screen Details  Does the patient have corrective lenses (glasses/contacts)?: " No  No Corrective Lenses, PLUS LENS REQUIRED: Pass  Vision Acuity Screen  Vision Acuity Tool: [place]  RIGHT EYE: 10/12.5 (20/25)  LEFT EYE: 10/12.5 (20/25)  Is there a two line difference?: No  Vision Screen Results: Pass    Hearing Screen  RIGHT EAR  1000 Hz on Level 40 dB (Conditioning sound): Pass  1000 Hz on Level 20 dB: Pass  2000 Hz on Level 20 dB: Pass  4000 Hz on Level 20 dB: Pass  6000 Hz on Level 20 dB: Pass  8000 Hz on Level 20 dB: Pass  LEFT EAR  8000 Hz on Level 20 dB: Pass  6000 Hz on Level 20 dB: Pass  4000 Hz on Level 20 dB: Pass  2000 Hz on Level 20 dB: Pass  1000 Hz on Level 20 dB: Pass  500 Hz on Level 25 dB: Pass  RIGHT EAR  500 Hz on Level 25 dB: Pass  Results  Hearing Screen Results: Pass      Physical Exam  GENERAL: Active, alert, in no acute distress.  SKIN: Dark brown thickened 2nd and 4th toenails of the left foot.   HEAD: Normocephalic  EYES: Pupils equal, round, reactive, Extraocular muscles intact. Normal conjunctivae.  EARS: Normal canals. Tympanic membranes are normal; gray and translucent.  NOSE: Normal without discharge.  MOUTH/THROAT: Clear. No oral lesions. Teeth without obvious abnormalities.  NECK: Supple, no masses.  No thyromegaly.  LYMPH NODES: No adenopathy  LUNGS: Clear. No rales, rhonchi, wheezing or retractions  HEART: Regular rhythm. Normal S1/S2. No murmurs. Normal pulses.  ABDOMEN: Soft, non-tender, not distended, no masses or hepatosplenomegaly. Bowel sounds normal.   NEUROLOGIC: No focal findings. Cranial nerves grossly intact: DTR's normal. Normal gait, strength and tone  BACK: Spine is straight, no scoliosis.  EXTREMITIES: Full range of motion, no deformities  : Exam declined by parent/patient. Reason for decline: Patient/Parental preference    Signed Electronically by: KYM Farris CNP     PAST SURGICAL HISTORY:  No significant past surgical history

## 2024-10-03 NOTE — ED ADULT NURSE NOTE - NS ED NOTE ABUSE SUSPICION NEGLECT YN
1.  Continue current L-thyroxine dose 0.025 mg daily   2.  Contact office if symptoms of hyper or hypothyroidism are noted as discussed  3.  Follow up with adult Endo 6 months   4.  Recheck free T4 and TSH in 6 months     Adult Endocrine Providers:    [facility] ([place]):  [phone]   - Dr. Negro Leo  - Dr. Marie Ashley  - Dr. Fred Siegel  - Dr. Wen Olmedo    [facility] ([place]): [phone]   - Dr. Yariel Stone    [facility] ([place]): [phone]   - Dr. Liborio Pace    [facility] ([place]): [phone]    [facility]: [phone]    [facility]: [phone]   -  Dr. Kadi Schmid             -  Dr. Tolu Cano     [facility]:    [place]: [phone]   - Dr. Meagan Velazquez, [place]: [phone]   - Dr. Hassan              [place], [place]: [phone]      - Dr. Machuca    [facility]:   [place]: [phone]   - Dr. Christiane Rodriguez    As most adult endocrine providers have a significant wait you will need to schedule the visit prior to them turning 18 years of age in order to prevent delay in care and refills.  Please be aware some contact centers will not want to schedule the visit until after the patient has turned 18 years of age which will results in an extended time until visit.   Please ask to speak directly to the office and explain the patient will not need to be seen until AFTER they turn 20 years of age but an appointment should be made now.      No

## 2024-10-09 NOTE — ED ADULT NURSE NOTE - NS PRO AD NO ADVANCE DIRECTIVE
Marisol Cornell accompanied their child to the emergency department on 10/9/2024. They may return to work on 10/10/2024.      If you have any questions or concerns, please don't hesitate to call.      Jaziel Guan, FABIEN No

## 2024-12-20 NOTE — ED BEHAVIORAL HEALTH ASSESSMENT NOTE - NSBHMSEJUDGE_PSY_A_CORE
Hospitalist Progress Note      PCP: Tico Rodriguez DO    Date of Admission: 12/18/2024    Chief Complaint:  patient remains intubated, sedated with Propofol and Fentanyl, hypotensive requiring low dose Norepinephrine infusion,    Medications:  Reviewed    Infusion Medications    propofol 20 mcg/kg/min (12/20/24 0921)    sodium chloride      dextrose      sodium chloride      fentaNYL 175 mcg/hr (12/20/24 0657)    norepinephrine 2 mcg/min (12/20/24 0657)     Scheduled Medications    methylPREDNISolone  40 mg IntraVENous Daily    magnesium sulfate  2,000 mg IntraVENous Once    insulin lispro  0-16 Units SubCUTAneous Q4H    propofol        polyethylene glycol  17 g Oral Daily    docusate  100 mg Oral BID    amiodarone  200 mg Oral BID    chlorhexidine  15 mL Mouth/Throat BID    pantoprazole (PROTONIX) 40 mg in sodium chloride (PF) 0.9 % 10 mL injection  40 mg IntraVENous Daily    vancomycin (VANCOCIN) intermittent dosing (placeholder)   Other RX Placeholder    mycophenolate  500 mg Oral BID    propofol        sodium chloride flush  5-40 mL IntraVENous 2 times per day    heparin (porcine)  5,000 Units SubCUTAneous 3 times per day    piperacillin-tazobactam  3,375 mg IntraVENous Q12H    midazolam  2 mg IntraVENous Once    midodrine  10 mg Oral TID WC    [Held by provider] mycophenolate  500 mg Oral BID    cycloSPORINE  50 mg Oral BID     PRN Meds: LORazepam, propofol, sodium chloride, propofol, glucose, dextrose bolus **OR** dextrose bolus, glucagon (rDNA), dextrose, sodium chloride flush, sodium chloride, ondansetron **OR** ondansetron, acetaminophen **OR** acetaminophen, ipratropium 0.5 mg-albuterol 2.5 mg      Intake/Output Summary (Last 24 hours) at 12/20/2024 0953  Last data filed at 12/20/2024 0657  Gross per 24 hour   Intake 4446.18 ml   Output --   Net 4446.18 ml       Exam:    BP (!) 123/59   Pulse 99   Temp 98.2 °F (36.8 °C)   Resp 30   Ht 1.88 m (6' 2\")   Wt 75.6 kg (166 lb 10.7 oz)   SpO2 100%   BMI  21.40 kg/m²     General appearance: intubated, sedated  Lungs: diminished bilaterally  Heart: S1/S2,irregular  Abdomen: soft  Extremities: no edema      Labs:   Recent Labs     12/18/24  1411 12/18/24 2036 12/19/24  0458 12/19/24  0943 12/19/24  1312 12/20/24  0510   WBC 6.7  --  7.9  --   --  7.8   HGB 8.7*   < > 7.7* 7.9* 7.6* 7.8*   HCT 26.9*  --  24.4*  --   --  23.8*     --  164  --   --  179    < > = values in this interval not displayed.     Recent Labs     12/18/24  1409 12/18/24 2036 12/19/24 0458 12/19/24  0943 12/19/24  1312 12/20/24  0510   *  --  136  --   --  136   K 4.1  --  4.6  --   --  4.7   CL 96  --  98  --   --  101   CO2 13*  --  11*  --   --  10*   *  --  126*  --   --  123*   CREATININE 5.93*   < > 6.45* 6.0* 6.3* 6.51*   CALCIUM 8.8  --  8.1*  --   --  7.8*   PHOS  --   --  7.1*  --   --  5.9*    < > = values in this interval not displayed.     Recent Labs     12/18/24  1409   AST 33   ALT 16   BILITOT 1.3*   ALKPHOS 67     No results for input(s): \"INR\" in the last 72 hours.  No results for input(s): \"CKTOTAL\", \"TROPONINI\" in the last 72 hours.    Urinalysis:      Lab Results   Component Value Date/Time    NITRU Negative 11/04/2024 08:48 PM    WBCUA >100 11/04/2024 08:48 PM    WBCUA 13 08/11/2023 09:49 AM    BACTERIA Negative 11/04/2024 08:48 PM    RBCUA 3-5 11/04/2024 08:48 PM    RBCUA 2 08/11/2023 09:49 AM    BLOODU LARGE 11/04/2024 08:48 PM    SPECGRAV 1.015 08/17/2021 11:00 AM    GLUCOSEU Negative 11/04/2024 08:48 PM    GLUCOSEU GT 1 09/14/2011 08:57 AM       Radiology:  XR CHEST PORTABLE   Final Result   Cardiomegaly with underlying chronic changes seen throughout the lung fields   bilaterally. Minimal residual markings at the left lung base with trace left   pleural effusion.         Vascular duplex hemodialysis access right   Final Result   1.  Fistula is a radial artery to cephalic outflow vein fistula. The fistula is   widely patent with cephalic blood flow  Fair Poor
